# Patient Record
Sex: MALE | Race: WHITE | NOT HISPANIC OR LATINO | URBAN - METROPOLITAN AREA
[De-identification: names, ages, dates, MRNs, and addresses within clinical notes are randomized per-mention and may not be internally consistent; named-entity substitution may affect disease eponyms.]

---

## 2017-03-06 RX ORDER — MOXIFLOXACIN HYDROCHLORIDE TABLETS, 400 MG 400 MG/1
1 TABLET, FILM COATED ORAL
Qty: 0 | Refills: 0 | COMMUNITY
Start: 2017-03-06 | End: 2017-03-16

## 2017-03-14 ENCOUNTER — INPATIENT (INPATIENT)
Facility: HOSPITAL | Age: 67
LOS: 3 days | Discharge: HOME | DRG: 866 | End: 2017-03-18
Attending: INTERNAL MEDICINE | Admitting: INTERNAL MEDICINE
Payer: COMMERCIAL

## 2017-03-14 VITALS — SYSTOLIC BLOOD PRESSURE: 187 MMHG | DIASTOLIC BLOOD PRESSURE: 101 MMHG | RESPIRATION RATE: 22 BRPM

## 2017-03-14 DIAGNOSIS — I50.9 HEART FAILURE, UNSPECIFIED: ICD-10-CM

## 2017-03-14 LAB
ALBUMIN SERPL ELPH-MCNC: 4.7 G/DL — SIGNIFICANT CHANGE UP (ref 3.3–5)
ALP SERPL-CCNC: 121 U/L — HIGH (ref 40–120)
ALT FLD-CCNC: 23 U/L RC — SIGNIFICANT CHANGE UP (ref 10–45)
ANION GAP SERPL CALC-SCNC: 17 MMOL/L — SIGNIFICANT CHANGE UP (ref 5–17)
APTT BLD: 30 SEC — SIGNIFICANT CHANGE UP (ref 27.5–37.4)
AST SERPL-CCNC: 17 U/L — SIGNIFICANT CHANGE UP (ref 10–40)
BASE EXCESS BLDV CALC-SCNC: 2 MMOL/L — SIGNIFICANT CHANGE UP (ref -2–2)
BASOPHILS # BLD AUTO: 0 K/UL — SIGNIFICANT CHANGE UP (ref 0–0.2)
BASOPHILS NFR BLD AUTO: 0.1 % — SIGNIFICANT CHANGE UP (ref 0–2)
BILIRUB SERPL-MCNC: 0.3 MG/DL — SIGNIFICANT CHANGE UP (ref 0.2–1.2)
BUN SERPL-MCNC: 22 MG/DL — SIGNIFICANT CHANGE UP (ref 7–23)
CA-I SERPL-SCNC: 1.26 MMOL/L — SIGNIFICANT CHANGE UP (ref 1.12–1.3)
CALCIUM SERPL-MCNC: 10.7 MG/DL — HIGH (ref 8.4–10.5)
CHLORIDE BLDV-SCNC: 105 MMOL/L — SIGNIFICANT CHANGE UP (ref 96–108)
CHLORIDE SERPL-SCNC: 97 MMOL/L — SIGNIFICANT CHANGE UP (ref 96–108)
CK SERPL-CCNC: 86 U/L — SIGNIFICANT CHANGE UP (ref 30–200)
CO2 BLDV-SCNC: 28 MMOL/L — SIGNIFICANT CHANGE UP (ref 22–30)
CO2 SERPL-SCNC: 24 MMOL/L — SIGNIFICANT CHANGE UP (ref 22–31)
CREAT SERPL-MCNC: 1.19 MG/DL — SIGNIFICANT CHANGE UP (ref 0.5–1.3)
EOSINOPHIL # BLD AUTO: 0.2 K/UL — SIGNIFICANT CHANGE UP (ref 0–0.5)
EOSINOPHIL NFR BLD AUTO: 3.1 % — SIGNIFICANT CHANGE UP (ref 0–6)
GAS PNL BLDV: 134 MMOL/L — LOW (ref 136–145)
GAS PNL BLDV: SIGNIFICANT CHANGE UP
GAS PNL BLDV: SIGNIFICANT CHANGE UP
GLUCOSE BLDV-MCNC: 277 MG/DL — HIGH (ref 70–99)
GLUCOSE SERPL-MCNC: 277 MG/DL — HIGH (ref 70–99)
HCO3 BLDV-SCNC: 27 MMOL/L — SIGNIFICANT CHANGE UP (ref 21–29)
HCOV 229E RNA SPEC QL NAA+PROBE: DETECTED
HCT VFR BLD CALC: 45.7 % — SIGNIFICANT CHANGE UP (ref 39–50)
HCT VFR BLDA CALC: 49 % — SIGNIFICANT CHANGE UP (ref 39–50)
HGB BLD CALC-MCNC: 16.1 G/DL — SIGNIFICANT CHANGE UP (ref 13–17)
HGB BLD-MCNC: 16.1 G/DL — SIGNIFICANT CHANGE UP (ref 13–17)
INR BLD: 1.06 RATIO — SIGNIFICANT CHANGE UP (ref 0.88–1.16)
LACTATE BLDV-MCNC: 2.5 MMOL/L — HIGH (ref 0.7–2)
LYMPHOCYTES # BLD AUTO: 0.8 K/UL — LOW (ref 1–3.3)
LYMPHOCYTES # BLD AUTO: 12.5 % — LOW (ref 13–44)
MAGNESIUM SERPL-MCNC: 1.7 MG/DL — SIGNIFICANT CHANGE UP (ref 1.6–2.6)
MCHC RBC-ENTMCNC: 32.4 PG — SIGNIFICANT CHANGE UP (ref 27–34)
MCHC RBC-ENTMCNC: 35.2 GM/DL — SIGNIFICANT CHANGE UP (ref 32–36)
MCV RBC AUTO: 91.9 FL — SIGNIFICANT CHANGE UP (ref 80–100)
MONOCYTES # BLD AUTO: 0.6 K/UL — SIGNIFICANT CHANGE UP (ref 0–0.9)
MONOCYTES NFR BLD AUTO: 9.9 % — SIGNIFICANT CHANGE UP (ref 2–14)
NEUTROPHILS # BLD AUTO: 4.6 K/UL — SIGNIFICANT CHANGE UP (ref 1.8–7.4)
NEUTROPHILS NFR BLD AUTO: 74.4 % — SIGNIFICANT CHANGE UP (ref 43–77)
NT-PROBNP SERPL-SCNC: 102 PG/ML — SIGNIFICANT CHANGE UP (ref 0–300)
OTHER CELLS CSF MANUAL: 8 ML/DL — LOW (ref 18–22)
PCO2 BLDV: 45 MMHG — SIGNIFICANT CHANGE UP (ref 35–50)
PH BLDV: 7.4 — SIGNIFICANT CHANGE UP (ref 7.35–7.45)
PHOSPHATE SERPL-MCNC: 2.7 MG/DL — SIGNIFICANT CHANGE UP (ref 2.5–4.5)
PLATELET # BLD AUTO: 273 K/UL — SIGNIFICANT CHANGE UP (ref 150–400)
PO2 BLDV: 23 MMHG — LOW (ref 25–45)
POTASSIUM BLDV-SCNC: 4.3 MMOL/L — SIGNIFICANT CHANGE UP (ref 3.5–5)
POTASSIUM SERPL-MCNC: 4.6 MMOL/L — SIGNIFICANT CHANGE UP (ref 3.5–5.3)
POTASSIUM SERPL-SCNC: 4.6 MMOL/L — SIGNIFICANT CHANGE UP (ref 3.5–5.3)
PROT SERPL-MCNC: 8.4 G/DL — HIGH (ref 6–8.3)
PROTHROM AB SERPL-ACNC: 11.6 SEC — SIGNIFICANT CHANGE UP (ref 10–13.1)
RAPID RVP RESULT: DETECTED
RBC # BLD: 4.98 M/UL — SIGNIFICANT CHANGE UP (ref 4.2–5.8)
RBC # FLD: 11.7 % — SIGNIFICANT CHANGE UP (ref 10.3–14.5)
RSV RNA SPEC QL NAA+PROBE: DETECTED
SAO2 % BLDV: 36 % — LOW (ref 67–88)
SODIUM SERPL-SCNC: 138 MMOL/L — SIGNIFICANT CHANGE UP (ref 135–145)
TROPONIN T SERPL-MCNC: <0.01 NG/ML — SIGNIFICANT CHANGE UP (ref 0–0.06)
WBC # BLD: 6.2 K/UL — SIGNIFICANT CHANGE UP (ref 3.8–10.5)
WBC # FLD AUTO: 6.2 K/UL — SIGNIFICANT CHANGE UP (ref 3.8–10.5)

## 2017-03-14 PROCEDURE — 93010 ELECTROCARDIOGRAM REPORT: CPT

## 2017-03-14 PROCEDURE — 99291 CRITICAL CARE FIRST HOUR: CPT | Mod: 25

## 2017-03-14 PROCEDURE — 71010: CPT | Mod: 26

## 2017-03-14 PROCEDURE — 99223 1ST HOSP IP/OBS HIGH 75: CPT

## 2017-03-14 RX ORDER — LABETALOL HCL 100 MG
10 TABLET ORAL ONCE
Qty: 0 | Refills: 0 | Status: COMPLETED | OUTPATIENT
Start: 2017-03-14 | End: 2017-03-14

## 2017-03-14 RX ORDER — IPRATROPIUM/ALBUTEROL SULFATE 18-103MCG
3 AEROSOL WITH ADAPTER (GRAM) INHALATION EVERY 6 HOURS
Qty: 0 | Refills: 0 | Status: DISCONTINUED | OUTPATIENT
Start: 2017-03-14 | End: 2017-03-15

## 2017-03-14 RX ORDER — ASPIRIN/CALCIUM CARB/MAGNESIUM 324 MG
81 TABLET ORAL ONCE
Qty: 0 | Refills: 0 | Status: DISCONTINUED | OUTPATIENT
Start: 2017-03-14 | End: 2017-03-14

## 2017-03-14 RX ORDER — ACETAMINOPHEN 500 MG
650 TABLET ORAL EVERY 6 HOURS
Qty: 0 | Refills: 0 | Status: DISCONTINUED | OUTPATIENT
Start: 2017-03-14 | End: 2017-03-18

## 2017-03-14 RX ORDER — ASPIRIN/CALCIUM CARB/MAGNESIUM 324 MG
324 TABLET ORAL ONCE
Qty: 0 | Refills: 0 | Status: COMPLETED | OUTPATIENT
Start: 2017-03-14 | End: 2017-03-14

## 2017-03-14 RX ORDER — FUROSEMIDE 40 MG
40 TABLET ORAL ONCE
Qty: 0 | Refills: 0 | Status: COMPLETED | OUTPATIENT
Start: 2017-03-14 | End: 2017-03-14

## 2017-03-14 RX ADMIN — Medication 243 MILLIGRAM(S): at 22:45

## 2017-03-14 RX ADMIN — Medication 10 MILLIGRAM(S): at 21:47

## 2017-03-14 RX ADMIN — Medication 40 MILLIGRAM(S): at 22:44

## 2017-03-14 NOTE — H&P ADULT. - NEUROLOGICAL DETAILS
normal strength/responds to verbal commands/responds to pain/sensation intact/cranial nerves intact/alert and oriented x 3

## 2017-03-14 NOTE — ED PROVIDER NOTE - CRITICAL CARE PROVIDED
additional history taking/interpretation of diagnostic studies/consult w/ pt's family directly relating to pts condition/documentation/direct patient care (not related to procedure)

## 2017-03-14 NOTE — H&P ADULT. - ATTENDING COMMENTS
Dr. Cuba Wayne accepted patient's case from the ED and requested hospitalist team to complete admission. Patient previously unknown to me and I was assigned to case. Dr. Wayne/[facility] team to continue care. Dr. Cuba Wayne accepted patient's case from the ED and requested hospitalist team to complete admission. Patient previously unknown to me and I was assigned to case. Dr. Wayne/[facility] team to continue care. Sign out provided to NP.

## 2017-03-14 NOTE — ED ADULT NURSE NOTE - CHPI ED SYMPTOMS NEG
no cough/no dizziness/no fever/no shortness of breath/no diaphoresis/no chills/no syncope/no nausea/no vomiting/no back pain

## 2017-03-14 NOTE — H&P ADULT. - PROBLEM SELECTOR PLAN 4
Patient with reported SBP in 200s yet documented SBP 180s in ED.  BP improved in setting after labetalol and lasix  Continue home regimen

## 2017-03-14 NOTE — ED PROVIDER NOTE - PROGRESS NOTE DETAILS
Dr. Stoddard Note: pt much improved after bipap, BP reduction, and lasix.  Will avoid further beta-blocker use given concern for acute chf, use nitrates and lasix and bipap as needed, stable for admission.  Checked rectal temp, normal, and given +JVD, +cardiac wheeze and rales and very BP with prior h/o CHF, suspect pt with acute pulmonary edema and CHF rather than pneumonia.  Stable for admission, updated pt and family.  Pt wanted to leave, but after updating on all of this, pt agreeable to stay.

## 2017-03-14 NOTE — ED PROVIDER NOTE - CARE PLAN
Principal Discharge DX:	CHF exacerbation  Secondary Diagnosis:	Dyspnea on exertion  Secondary Diagnosis:	Respiratory difficulty

## 2017-03-14 NOTE — H&P ADULT. - ASSESSMENT
65 yo man with PMH of Sarcoidosis, Asthma, DM Type 2, Ventricular Dysfunction ?CHF with AICD placed 4 years ago (last reported EF 45% Sept 2016), HTN, BPH, presents from home in setting of persistent productive cough and shortness of breath on exertion concerning for pulm edema in setting of RSV and coronavirus infection. 67 yo man with PMH of Sarcoidosis, Asthma, DM Type 2, Ventricular Dysfunction ?CHF with AICD placed 4 years ago (last reported EF 45% Sept 2016), HTN, BPH, presents from home in setting of persistent productive cough and shortness of breath on exertion concerning for pulmonary edema in setting of RSV and coronavirus infection.

## 2017-03-14 NOTE — ED PROVIDER NOTE - PHYSICAL EXAMINATION
Physical Exam: elderly M in acute respiratory distress, AAOx3, NCAT, MMM, Trachea midline, PERRLA, bilateral ronchi and expiratory wheeze, no rales, tachycardia and regular rhythm, abdomen is soft and NTND, No LE edema, No deformity of extremities, No rashes, CN grossly intact, No focal motor or sensory deficits. ~ Bill Reveles MD

## 2017-03-14 NOTE — H&P ADULT. - PROBLEM SELECTOR PLAN 5
Per patient he has not been formally diagnosed with CHF however last reported EF per patient at 45%  Check TTE  Monitor I/O  Daily weights  Continue regimen of Entresto and Spironolactone   Primary day team to dose Lasix daily prn pending patient's fluid status during day

## 2017-03-14 NOTE — ED PROVIDER NOTE - MEDICAL DECISION MAKING DETAILS
dyspnea on exertion, acute resp distress, cough, concerning for CHF exacerbation vs PNA, COPD, exam shows cardiac wheeze and ronchi, CXR w/ L>R cephalization of vessels, initially hypertensive to 180-200 systolic. will give lasix, bipap and admit to tele

## 2017-03-14 NOTE — H&P ADULT. - HISTORY OF PRESENT ILLNESS
Hannah, 67 yo man with PMH of Sarcoidosis, Asthma, DM Type 2, Ventricular Dysfunction ?CHF with AICD placed 4 years ago (last reported EF 45% Sept 2016), HTN, BPH, presents from home in setting of persistent productive cough and shortness of breath on exertion in the past few days acutely worsening today. In the past few days the cough is yellow-green mucus, nasal discharge, and head congestion. Intermittent nausea in setting intractable cough at times yet no emesis. Denies associated CP, palpitations, diaphoresis.  Patient states that he has been experiencing a productive cough in the past cough that initially began 1 month ago where he saw his PMD: obtained CXR with revealed PNA and was prescribed a Z-pack. Patient felt improvement. Thereafter, patient went to [place] returned 2 weeks ago where coughing returned and saw PMD: at that time he was prescribed Moxifloxacin (took 8 days of  course), Medrol Dose pack, Proair. Patient did not feel improvement of symptoms and came to the ED. Denies fevers, chills, sweats. Denies SOB at rest. Denies orthopnea, LE edema.     Patient states that an cardiac ischemic work up in the past prior to AICD placement and denies placement of stents. Hannah, 67 yo man with PMH of Sarcoidosis, Asthma, DM Type 2, Ventricular Dysfunction ?CHF with AICD placed 4 years ago (last reported EF 45% Sept 2016), HTN, BPH, presents from home in setting of persistent productive cough and shortness of breath on exertion in the past few days acutely worsening today. In the past few days the cough is yellow-green mucus, nasal discharge, and head congestion. Intermittent nausea in setting intractable cough at times yet no emesis. Denies associated CP, palpitations, diaphoresis.  Patient states that he has been experiencing a productive cough in the past cough that initially began 1 month ago where he saw his PMD: obtained CXR with revealed PNA and was prescribed a Z-pack. Patient felt improvement. Thereafter, patient went to [place] returned 2 weeks ago where coughing returned and saw PMD: at that time he was prescribed Moxifloxacin (took 8 days of  course), Medrol Dose pack, Proair. Patient did not feel improvement of symptoms and came to the ED. Denies fevers, chills, sweats. Denies SOB at rest. Denies orthopnea, LE edema.     Patient states that an cardiac ischemic work up in the past prior to AICD placement and denies placement of stents.  Of note, with regards to ventricular dysfunction, patient states he was found to have scarring of the ventricular and it did not appear ischemic in nature, unable to obtain biopsy in past.

## 2017-03-14 NOTE — H&P ADULT. - PROBLEM SELECTOR PLAN 6
Elevated serum glucose 277  Hold Glyburide/Metformin  Check HgbA1c. Monitor FS  Corrective SSI  Half Lantus dose (14U) qHS in setting of NPO status while on BiPAP  Primary day team to readjust insulin regimen on 3/15 day pending FS and diet status.

## 2017-03-14 NOTE — ED PROVIDER NOTE - NS ED ROS FT
No fever, no chills, no change in vision, no change in hearing, no chest pain, + shortness of breath, no abdominal pain, no vomiting, no dysuria, no muscle pain, no rashes, no loss of consciousness. ~ Bill Reveles MD

## 2017-03-14 NOTE — ED PROVIDER NOTE - ATTENDING CONTRIBUTION TO CARE
I, Dr. Ivan Stoddard, interviewed the patient and performed a physical exam and spoke to the resident about the plan of care for this patient.  Pt with acute worsening of dyspnea today with cough, no fever.  +JVD about 4cm, cardiac wheezing and rales, BP high, no peripheral edema, tachypneic with increased work of breathing.

## 2017-03-14 NOTE — ED PROVIDER NOTE - OBJECTIVE STATEMENT
SOB and coughing for 10 days, worsening acutely today. Dyspnea on exertion, dec activity tolerance.   Productive with white sputum, no hemoptysis. Associated w/ nausea. No CP, vomiting, headache.  1 month ago took Z=anu for cough. Then placed on moxifloxacin for cough, also given steroids and inhalers.  Hx of AICD, DM (no HTN), "ventricular dysfunction"    PMD: [facility] (previously Jose David Barry)  Cardio: [facility]

## 2017-03-14 NOTE — ED ADULT NURSE NOTE - OBJECTIVE STATEMENT
Pt 67 yo male diff breathing x 2 days with pmh HTN, ICD for cardiomyopathy, patient appears slightly tachypneic, o2 sat 95 RA. Denies chest pain, dizziness, LOC, Weakness. SOB gets worst with activity and improves with rest.  Rhonchi noted bilateral lungs. Pt not on blood thinners. No fever or chills at this time. Had upper respiratory infection about a week ago, and was placed on zpack by PCP. Patient placed on Bipap machine, EKG completed. Labs drawn and sent. Maintained on cardiac monitor.

## 2017-03-14 NOTE — ED ADULT NURSE REASSESSMENT NOTE - NS ED NURSE REASSESS COMMENT FT1
Patient refusing to take Lasix at this time " unless its absolutely necessary" pt states he already suffers from urinary frequency. Awaiting results from xray, and labs. Updated on plan of care. MD Reveles made aware of pt concern. Awaiting further instruction. Safety and supportive care provided.

## 2017-03-14 NOTE — H&P ADULT. - RESPIRATORY COMMENTS
on BiPAP and states he feels improved with BiPAP on BiPAP and states he feels improved with BiPAP. speaking in full sentense

## 2017-03-14 NOTE — H&P ADULT. - FAMILY HISTORY
Father  Still living? Unknown  Family history of acute congestive heart failure, Age at diagnosis: Age Unknown  Family history of chronic renal failure syndrome, Age at diagnosis: Age Unknown     Mother  Still living? Unknown  Family history of heart disease, Age at diagnosis: Age Unknown

## 2017-03-14 NOTE — H&P ADULT. - PMH
Asthma    BPH (benign prostatic hyperplasia)    Diabetes mellitus type 2 in nonobese    Sarcoidosis    Ventricular dysfunction of heart

## 2017-03-14 NOTE — H&P ADULT. - PROBLEM SELECTOR PLAN 1
Multifactorial in setting of accelerated HTN likely causing pulmonary edema and viral infection of both RSV and Coronavirus.   Patient given Labetalol 10 mg IV x1 and Lasix 40 mg IV x1 and placed on BiPAP by ED team: Patient endorses improvement of symptoms  May have component of asthma exacerbation in setting of viral infection  -Initiates duoneb  -Continue BiPAP and consider wean overnight.  -Monitor I/O  -Daily weights  -Monitor tele  -Trend CE  -Check TTE  -BP control with home regimen   -Check CT Chest in setting of possible persistent PNA in setting of chronicity of symptoms and underlying Sarcoid.

## 2017-03-15 DIAGNOSIS — Z29.9 ENCOUNTER FOR PROPHYLACTIC MEASURES, UNSPECIFIED: ICD-10-CM

## 2017-03-15 DIAGNOSIS — B97.4 RESPIRATORY SYNCYTIAL VIRUS AS THE CAUSE OF DISEASES CLASSIFIED ELSEWHERE: ICD-10-CM

## 2017-03-15 DIAGNOSIS — I51.9 HEART DISEASE, UNSPECIFIED: ICD-10-CM

## 2017-03-15 DIAGNOSIS — E11.9 TYPE 2 DIABETES MELLITUS WITHOUT COMPLICATIONS: ICD-10-CM

## 2017-03-15 DIAGNOSIS — I10 ESSENTIAL (PRIMARY) HYPERTENSION: ICD-10-CM

## 2017-03-15 DIAGNOSIS — B34.2 CORONAVIRUS INFECTION, UNSPECIFIED: ICD-10-CM

## 2017-03-15 DIAGNOSIS — R06.09 OTHER FORMS OF DYSPNEA: ICD-10-CM

## 2017-03-15 DIAGNOSIS — Z95.810 PRESENCE OF AUTOMATIC (IMPLANTABLE) CARDIAC DEFIBRILLATOR: Chronic | ICD-10-CM

## 2017-03-15 LAB
ANION GAP SERPL CALC-SCNC: 17 MMOL/L — SIGNIFICANT CHANGE UP (ref 5–17)
BASOPHILS # BLD AUTO: 0.02 K/UL — SIGNIFICANT CHANGE UP (ref 0–0.2)
BASOPHILS NFR BLD AUTO: 0.3 % — SIGNIFICANT CHANGE UP (ref 0–2)
BUN SERPL-MCNC: 25 MG/DL — HIGH (ref 7–23)
CALCIUM SERPL-MCNC: 9.8 MG/DL — SIGNIFICANT CHANGE UP (ref 8.4–10.5)
CHLORIDE SERPL-SCNC: 99 MMOL/L — SIGNIFICANT CHANGE UP (ref 96–108)
CK MB CFR SERPL CALC: 2.2 NG/ML — SIGNIFICANT CHANGE UP (ref 0–6.7)
CK SERPL-CCNC: 56 U/L — SIGNIFICANT CHANGE UP (ref 30–200)
CO2 SERPL-SCNC: 22 MMOL/L — SIGNIFICANT CHANGE UP (ref 22–31)
CREAT SERPL-MCNC: 1.12 MG/DL — SIGNIFICANT CHANGE UP (ref 0.5–1.3)
EOSINOPHIL # BLD AUTO: 0.11 K/UL — SIGNIFICANT CHANGE UP (ref 0–0.5)
EOSINOPHIL NFR BLD AUTO: 1.8 % — SIGNIFICANT CHANGE UP (ref 0–6)
GAS PNL BLDA: SIGNIFICANT CHANGE UP
GLUCOSE SERPL-MCNC: 167 MG/DL — HIGH (ref 70–99)
HBA1C BLD-MCNC: 7 % — HIGH (ref 4–5.6)
HCT VFR BLD CALC: 41.8 % — SIGNIFICANT CHANGE UP (ref 39–50)
HGB BLD-MCNC: 14.4 G/DL — SIGNIFICANT CHANGE UP (ref 13–17)
IMM GRANULOCYTES NFR BLD AUTO: 0.3 % — SIGNIFICANT CHANGE UP (ref 0–1.5)
LYMPHOCYTES # BLD AUTO: 0.62 K/UL — LOW (ref 1–3.3)
LYMPHOCYTES # BLD AUTO: 10.4 % — LOW (ref 13–44)
MAGNESIUM SERPL-MCNC: 1.6 MG/DL — SIGNIFICANT CHANGE UP (ref 1.6–2.6)
MCHC RBC-ENTMCNC: 31.3 PG — SIGNIFICANT CHANGE UP (ref 27–34)
MCHC RBC-ENTMCNC: 34.4 GM/DL — SIGNIFICANT CHANGE UP (ref 32–36)
MCV RBC AUTO: 90.9 FL — SIGNIFICANT CHANGE UP (ref 80–100)
MONOCYTES # BLD AUTO: 0.86 K/UL — SIGNIFICANT CHANGE UP (ref 0–0.9)
MONOCYTES NFR BLD AUTO: 14.5 % — HIGH (ref 2–14)
NEUTROPHILS # BLD AUTO: 4.32 K/UL — SIGNIFICANT CHANGE UP (ref 1.8–7.4)
NEUTROPHILS NFR BLD AUTO: 72.7 % — SIGNIFICANT CHANGE UP (ref 43–77)
PHOSPHATE SERPL-MCNC: 3.9 MG/DL — SIGNIFICANT CHANGE UP (ref 2.5–4.5)
PLATELET # BLD AUTO: 242 K/UL — SIGNIFICANT CHANGE UP (ref 150–400)
POTASSIUM SERPL-MCNC: 4.5 MMOL/L — SIGNIFICANT CHANGE UP (ref 3.5–5.3)
POTASSIUM SERPL-SCNC: 4.5 MMOL/L — SIGNIFICANT CHANGE UP (ref 3.5–5.3)
RBC # BLD: 4.6 M/UL — SIGNIFICANT CHANGE UP (ref 4.2–5.8)
RBC # FLD: 12.8 % — SIGNIFICANT CHANGE UP (ref 10.3–14.5)
SODIUM SERPL-SCNC: 138 MMOL/L — SIGNIFICANT CHANGE UP (ref 135–145)
TROPONIN T SERPL-MCNC: <0.01 NG/ML — SIGNIFICANT CHANGE UP (ref 0–0.06)
WBC # BLD: 5.95 K/UL — SIGNIFICANT CHANGE UP (ref 3.8–10.5)
WBC # FLD AUTO: 5.95 K/UL — SIGNIFICANT CHANGE UP (ref 3.8–10.5)

## 2017-03-15 PROCEDURE — 93306 TTE W/DOPPLER COMPLETE: CPT | Mod: 26

## 2017-03-15 PROCEDURE — 71250 CT THORAX DX C-: CPT | Mod: 26

## 2017-03-15 RX ORDER — INSULIN LISPRO 100/ML
VIAL (ML) SUBCUTANEOUS AT BEDTIME
Qty: 0 | Refills: 0 | Status: DISCONTINUED | OUTPATIENT
Start: 2017-03-15 | End: 2017-03-18

## 2017-03-15 RX ORDER — DEXTROSE 50 % IN WATER 50 %
25 SYRINGE (ML) INTRAVENOUS ONCE
Qty: 0 | Refills: 0 | Status: DISCONTINUED | OUTPATIENT
Start: 2017-03-15 | End: 2017-03-18

## 2017-03-15 RX ORDER — SODIUM CHLORIDE 9 MG/ML
1000 INJECTION, SOLUTION INTRAVENOUS
Qty: 0 | Refills: 0 | Status: DISCONTINUED | OUTPATIENT
Start: 2017-03-15 | End: 2017-03-18

## 2017-03-15 RX ORDER — DEXTROSE 50 % IN WATER 50 %
1 SYRINGE (ML) INTRAVENOUS ONCE
Qty: 0 | Refills: 0 | Status: DISCONTINUED | OUTPATIENT
Start: 2017-03-15 | End: 2017-03-18

## 2017-03-15 RX ORDER — SACUBITRIL AND VALSARTAN 24; 26 MG/1; MG/1
1 TABLET, FILM COATED ORAL
Qty: 0 | Refills: 0 | Status: DISCONTINUED | OUTPATIENT
Start: 2017-03-15 | End: 2017-03-15

## 2017-03-15 RX ORDER — SACUBITRIL AND VALSARTAN 24; 26 MG/1; MG/1
1 TABLET, FILM COATED ORAL
Qty: 0 | Refills: 0 | Status: DISCONTINUED | OUTPATIENT
Start: 2017-03-15 | End: 2017-03-18

## 2017-03-15 RX ORDER — INSULIN LISPRO 100/ML
VIAL (ML) SUBCUTANEOUS EVERY 6 HOURS
Qty: 0 | Refills: 0 | Status: DISCONTINUED | OUTPATIENT
Start: 2017-03-15 | End: 2017-03-16

## 2017-03-15 RX ORDER — IPRATROPIUM/ALBUTEROL SULFATE 18-103MCG
3 AEROSOL WITH ADAPTER (GRAM) INHALATION EVERY 6 HOURS
Qty: 0 | Refills: 0 | Status: DISCONTINUED | OUTPATIENT
Start: 2017-03-15 | End: 2017-03-15

## 2017-03-15 RX ORDER — HEPARIN SODIUM 5000 [USP'U]/ML
5000 INJECTION INTRAVENOUS; SUBCUTANEOUS EVERY 8 HOURS
Qty: 0 | Refills: 0 | Status: DISCONTINUED | OUTPATIENT
Start: 2017-03-15 | End: 2017-03-18

## 2017-03-15 RX ORDER — GLUCAGON INJECTION, SOLUTION 0.5 MG/.1ML
1 INJECTION, SOLUTION SUBCUTANEOUS ONCE
Qty: 0 | Refills: 0 | Status: DISCONTINUED | OUTPATIENT
Start: 2017-03-15 | End: 2017-03-18

## 2017-03-15 RX ORDER — SPIRONOLACTONE 25 MG/1
25 TABLET, FILM COATED ORAL DAILY
Qty: 0 | Refills: 0 | Status: DISCONTINUED | OUTPATIENT
Start: 2017-03-15 | End: 2017-03-18

## 2017-03-15 RX ORDER — IPRATROPIUM/ALBUTEROL SULFATE 18-103MCG
3 AEROSOL WITH ADAPTER (GRAM) INHALATION
Qty: 0 | Refills: 0 | Status: DISCONTINUED | OUTPATIENT
Start: 2017-03-15 | End: 2017-03-18

## 2017-03-15 RX ORDER — TAMSULOSIN HYDROCHLORIDE 0.4 MG/1
0.4 CAPSULE ORAL AT BEDTIME
Qty: 0 | Refills: 0 | Status: DISCONTINUED | OUTPATIENT
Start: 2017-03-15 | End: 2017-03-18

## 2017-03-15 RX ORDER — SACUBITRIL AND VALSARTAN 24; 26 MG/1; MG/1
1 TABLET, FILM COATED ORAL
Qty: 0 | Refills: 0 | COMMUNITY

## 2017-03-15 RX ORDER — METOPROLOL TARTRATE 50 MG
50 TABLET ORAL DAILY
Qty: 0 | Refills: 0 | Status: DISCONTINUED | OUTPATIENT
Start: 2017-03-15 | End: 2017-03-18

## 2017-03-15 RX ORDER — INSULIN GLARGINE 100 [IU]/ML
14 INJECTION, SOLUTION SUBCUTANEOUS AT BEDTIME
Qty: 0 | Refills: 0 | Status: DISCONTINUED | OUTPATIENT
Start: 2017-03-15 | End: 2017-03-18

## 2017-03-15 RX ORDER — DEXTROSE 50 % IN WATER 50 %
12.5 SYRINGE (ML) INTRAVENOUS ONCE
Qty: 0 | Refills: 0 | Status: DISCONTINUED | OUTPATIENT
Start: 2017-03-15 | End: 2017-03-18

## 2017-03-15 RX ORDER — SPIRONOLACTONE 25 MG/1
0 TABLET, FILM COATED ORAL
Qty: 0 | Refills: 0 | COMMUNITY

## 2017-03-15 RX ORDER — INSULIN DETEMIR 100/ML (3)
0 INSULIN PEN (ML) SUBCUTANEOUS
Qty: 0 | Refills: 0 | COMMUNITY

## 2017-03-15 RX ORDER — MOXIFLOXACIN HYDROCHLORIDE TABLETS, 400 MG 400 MG/1
0 TABLET, FILM COATED ORAL
Qty: 0 | Refills: 0 | COMMUNITY

## 2017-03-15 RX ORDER — BUDESONIDE, MICRONIZED 100 %
0.5 POWDER (GRAM) MISCELLANEOUS EVERY 12 HOURS
Qty: 0 | Refills: 0 | Status: DISCONTINUED | OUTPATIENT
Start: 2017-03-15 | End: 2017-03-18

## 2017-03-15 RX ORDER — BUDESONIDE AND FORMOTEROL FUMARATE DIHYDRATE 160; 4.5 UG/1; UG/1
2 AEROSOL RESPIRATORY (INHALATION)
Qty: 0 | Refills: 0 | COMMUNITY

## 2017-03-15 RX ADMIN — Medication 3: at 21:51

## 2017-03-15 RX ADMIN — Medication 200 MILLIGRAM(S): at 12:00

## 2017-03-15 RX ADMIN — Medication 3 MILLILITER(S): at 05:17

## 2017-03-15 RX ADMIN — Medication 200 MILLIGRAM(S): at 21:20

## 2017-03-15 RX ADMIN — Medication 2: at 19:01

## 2017-03-15 RX ADMIN — SPIRONOLACTONE 25 MILLIGRAM(S): 25 TABLET, FILM COATED ORAL at 18:56

## 2017-03-15 RX ADMIN — Medication 3 MILLILITER(S): at 12:25

## 2017-03-15 RX ADMIN — Medication 40 MILLIGRAM(S): at 14:11

## 2017-03-15 RX ADMIN — Medication 3 MILLILITER(S): at 18:55

## 2017-03-15 RX ADMIN — Medication 0.5 MILLIGRAM(S): at 21:19

## 2017-03-15 RX ADMIN — Medication 3 MILLILITER(S): at 21:19

## 2017-03-15 RX ADMIN — Medication 50 MILLIGRAM(S): at 18:57

## 2017-03-15 RX ADMIN — Medication 3 MILLILITER(S): at 00:23

## 2017-03-15 RX ADMIN — TAMSULOSIN HYDROCHLORIDE 0.4 MILLIGRAM(S): 0.4 CAPSULE ORAL at 21:20

## 2017-03-15 RX ADMIN — INSULIN GLARGINE 14 UNIT(S): 100 INJECTION, SOLUTION SUBCUTANEOUS at 22:24

## 2017-03-15 RX ADMIN — Medication 40 MILLIGRAM(S): at 18:56

## 2017-03-15 RX ADMIN — Medication 3: at 13:49

## 2017-03-15 NOTE — ED ADULT NURSE REASSESSMENT NOTE - NS ED NURSE REASSESS COMMENT FT1
Patient alert and oriented x 3. Updated on plan of care. Patient awaiting isolation room (admission bed). Report given to holding. Respiratory paged for transfer to holding room. Maintained on cardiac monitor, and Bipap machine.

## 2017-03-16 LAB
ANION GAP SERPL CALC-SCNC: 18 MMOL/L — HIGH (ref 5–17)
BUN SERPL-MCNC: 31 MG/DL — HIGH (ref 7–23)
CALCIUM SERPL-MCNC: 9.6 MG/DL — SIGNIFICANT CHANGE UP (ref 8.4–10.5)
CHLORIDE SERPL-SCNC: 97 MMOL/L — SIGNIFICANT CHANGE UP (ref 96–108)
CO2 SERPL-SCNC: 21 MMOL/L — LOW (ref 22–31)
CREAT SERPL-MCNC: 1.21 MG/DL — SIGNIFICANT CHANGE UP (ref 0.5–1.3)
GLUCOSE SERPL-MCNC: 305 MG/DL — HIGH (ref 70–99)
HCT VFR BLD CALC: 39.5 % — SIGNIFICANT CHANGE UP (ref 39–50)
HGB BLD-MCNC: 13.6 G/DL — SIGNIFICANT CHANGE UP (ref 13–17)
MCHC RBC-ENTMCNC: 31.1 PG — SIGNIFICANT CHANGE UP (ref 27–34)
MCHC RBC-ENTMCNC: 34.4 GM/DL — SIGNIFICANT CHANGE UP (ref 32–36)
MCV RBC AUTO: 90.4 FL — SIGNIFICANT CHANGE UP (ref 80–100)
PLATELET # BLD AUTO: 283 K/UL — SIGNIFICANT CHANGE UP (ref 150–400)
POTASSIUM SERPL-MCNC: 5.1 MMOL/L — SIGNIFICANT CHANGE UP (ref 3.5–5.3)
POTASSIUM SERPL-SCNC: 5.1 MMOL/L — SIGNIFICANT CHANGE UP (ref 3.5–5.3)
RBC # BLD: 4.37 M/UL — SIGNIFICANT CHANGE UP (ref 4.2–5.8)
RBC # FLD: 12.6 % — SIGNIFICANT CHANGE UP (ref 10.3–14.5)
SODIUM SERPL-SCNC: 136 MMOL/L — SIGNIFICANT CHANGE UP (ref 135–145)
WBC # BLD: 9.14 K/UL — SIGNIFICANT CHANGE UP (ref 3.8–10.5)
WBC # FLD AUTO: 9.14 K/UL — SIGNIFICANT CHANGE UP (ref 3.8–10.5)

## 2017-03-16 RX ORDER — INSULIN LISPRO 100/ML
12 VIAL (ML) SUBCUTANEOUS ONCE
Qty: 0 | Refills: 0 | Status: COMPLETED | OUTPATIENT
Start: 2017-03-16 | End: 2017-03-16

## 2017-03-16 RX ORDER — BENZOCAINE AND MENTHOL 5; 1 G/100ML; G/100ML
1 LIQUID ORAL ONCE
Qty: 0 | Refills: 0 | Status: COMPLETED | OUTPATIENT
Start: 2017-03-16 | End: 2017-03-16

## 2017-03-16 RX ORDER — INSULIN LISPRO 100/ML
VIAL (ML) SUBCUTANEOUS EVERY 6 HOURS
Qty: 0 | Refills: 0 | Status: DISCONTINUED | OUTPATIENT
Start: 2017-03-16 | End: 2017-03-18

## 2017-03-16 RX ADMIN — Medication 20 MILLIGRAM(S): at 17:55

## 2017-03-16 RX ADMIN — Medication 50 MILLIGRAM(S): at 05:41

## 2017-03-16 RX ADMIN — Medication 6: at 12:05

## 2017-03-16 RX ADMIN — Medication 3 MILLILITER(S): at 05:42

## 2017-03-16 RX ADMIN — Medication 3: at 22:36

## 2017-03-16 RX ADMIN — Medication 4: at 09:43

## 2017-03-16 RX ADMIN — Medication 200 MILLIGRAM(S): at 15:00

## 2017-03-16 RX ADMIN — Medication 40 MILLIGRAM(S): at 05:41

## 2017-03-16 RX ADMIN — Medication 10: at 17:56

## 2017-03-16 RX ADMIN — Medication 40 MILLIGRAM(S): at 00:13

## 2017-03-16 RX ADMIN — Medication 3 MILLILITER(S): at 21:04

## 2017-03-16 RX ADMIN — Medication 4: at 00:17

## 2017-03-16 RX ADMIN — Medication 12 UNIT(S): at 12:47

## 2017-03-16 RX ADMIN — Medication 3 MILLILITER(S): at 09:44

## 2017-03-16 RX ADMIN — Medication 0.5 MILLIGRAM(S): at 05:43

## 2017-03-16 RX ADMIN — Medication 3 MILLILITER(S): at 17:54

## 2017-03-16 RX ADMIN — Medication 200 MILLIGRAM(S): at 21:03

## 2017-03-16 RX ADMIN — SACUBITRIL AND VALSARTAN 1 TABLET(S): 24; 26 TABLET, FILM COATED ORAL at 17:56

## 2017-03-16 RX ADMIN — SACUBITRIL AND VALSARTAN 1 TABLET(S): 24; 26 TABLET, FILM COATED ORAL at 05:44

## 2017-03-16 RX ADMIN — Medication 0.5 MILLIGRAM(S): at 17:54

## 2017-03-16 RX ADMIN — BENZOCAINE AND MENTHOL 1 LOZENGE: 5; 1 LIQUID ORAL at 20:46

## 2017-03-16 RX ADMIN — SPIRONOLACTONE 25 MILLIGRAM(S): 25 TABLET, FILM COATED ORAL at 05:41

## 2017-03-16 RX ADMIN — Medication 20 MILLIGRAM(S): at 23:55

## 2017-03-16 RX ADMIN — Medication 200 MILLIGRAM(S): at 05:41

## 2017-03-16 RX ADMIN — Medication 3 MILLILITER(S): at 15:03

## 2017-03-16 RX ADMIN — INSULIN GLARGINE 14 UNIT(S): 100 INJECTION, SOLUTION SUBCUTANEOUS at 22:37

## 2017-03-16 RX ADMIN — TAMSULOSIN HYDROCHLORIDE 0.4 MILLIGRAM(S): 0.4 CAPSULE ORAL at 21:03

## 2017-03-16 NOTE — DIETITIAN INITIAL EVALUATION ADULT. - NS AS NUTRI INTERV ED CONTENT
Educated pt on consistent carbohydrate diet, portion sizing including benefit of mixed meals, "my plate" model, label reading and strategies to promote improved glucose control. Recommended pt to increase consumption of whole grains, consume protein and fiber with CHO, avoid concentrated sweets. Discussed healthier snack and meal ideas, carbohydrate counting methods, and reviewed sample meal. Discussed heart healthy diet; identified foods high in sodium and fats, limit sodium intake, increased consumption of lean meats, fruit and vegetables, healthy fats and oils, cooking tips, daily wt checks and wt gain parameters and healthy snacking options.

## 2017-03-16 NOTE — DIETITIAN INITIAL EVALUATION ADULT. - ORAL INTAKE PTA
Usual breakfast- fruits, english muffin with sugar free jelly, coffee; Lunch and dinner - wife prepares meals using fresh ingredients such as whole grain/multigrain rice/pasta/bread, vegetables, lean ,meats/fish. Pt reports snacking on fruits and only drinks water and diet soda./good

## 2017-03-16 NOTE — DIETITIAN INITIAL EVALUATION ADULT. - ADHERENCE
good/Pt avoids concentrated sweets, does not eat out or consume processed foods, avoids using salt and consumes moderate amounts of whole grain carbohydrates

## 2017-03-16 NOTE — DIETITIAN INITIAL EVALUATION ADULT. - ENERGY NEEDS
Height: 74 inches, Weight:182 pounds  BMI: 24 kg/m2 IBW: 190 pounds (+/-10%), %IBW:95%  Pertinent Info: Per chart, 67 y/o male with PMH of CHF with AICD, T2DM, asthma, sarcoidosis admitted with SOB s/p BiPAP. No edema, no pressure ulcers noted at this time.

## 2017-03-16 NOTE — DIETITIAN INITIAL EVALUATION ADULT. - OTHER INFO
Nutrition consult received for HgbA1c >7%. Pt reports good appetite and po intakes, noted 100% po intake at visit and flowsheet. No GI distress, +BM today. Pt denies chewing/swallowing difficulties. NKFA - but states he cannot tolerate yogurt or cheese, yet able to drink small amounts of milk. PTA takes omega 3, Co Q 10, and vitamin D3. Pt with T2DM, on Metformin and lantus at home, reports checking fingersticks 1-2 x day with usual blood glucose range in low 100's. Pt with CHF, not on any diuretics at home, denies checking daily wts. Pt states he has been trying to follow healthy diet and knows the importance of diet adherence as his father also had CHF and diabetes. Pt and wife receptive to diet education provided.

## 2017-03-16 NOTE — DIETITIAN INITIAL EVALUATION ADULT. - NS AS NUTRI INTERV MEALS SNACK
Reviewed alternate menu options and menu ordering procedure. Provide food preferences within therapeutic diet when requested.

## 2017-03-17 LAB
ANION GAP SERPL CALC-SCNC: 13 MMOL/L — SIGNIFICANT CHANGE UP (ref 5–17)
ANION GAP SERPL CALC-SCNC: 16 MMOL/L — SIGNIFICANT CHANGE UP (ref 5–17)
BUN SERPL-MCNC: 36 MG/DL — HIGH (ref 7–23)
BUN SERPL-MCNC: 39 MG/DL — HIGH (ref 7–23)
CALCIUM SERPL-MCNC: 9.4 MG/DL — SIGNIFICANT CHANGE UP (ref 8.4–10.5)
CALCIUM SERPL-MCNC: 9.7 MG/DL — SIGNIFICANT CHANGE UP (ref 8.4–10.5)
CHLORIDE SERPL-SCNC: 96 MMOL/L — SIGNIFICANT CHANGE UP (ref 96–108)
CHLORIDE SERPL-SCNC: 98 MMOL/L — SIGNIFICANT CHANGE UP (ref 96–108)
CO2 SERPL-SCNC: 22 MMOL/L — SIGNIFICANT CHANGE UP (ref 22–31)
CO2 SERPL-SCNC: 23 MMOL/L — SIGNIFICANT CHANGE UP (ref 22–31)
CREAT SERPL-MCNC: 1.19 MG/DL — SIGNIFICANT CHANGE UP (ref 0.5–1.3)
CREAT SERPL-MCNC: 1.24 MG/DL — SIGNIFICANT CHANGE UP (ref 0.5–1.3)
GLUCOSE SERPL-MCNC: 265 MG/DL — HIGH (ref 70–99)
GLUCOSE SERPL-MCNC: 518 MG/DL — CRITICAL HIGH (ref 70–99)
HCT VFR BLD CALC: 38.4 % — LOW (ref 39–50)
HGB BLD-MCNC: 13.2 G/DL — SIGNIFICANT CHANGE UP (ref 13–17)
MCHC RBC-ENTMCNC: 31 PG — SIGNIFICANT CHANGE UP (ref 27–34)
MCHC RBC-ENTMCNC: 34.4 GM/DL — SIGNIFICANT CHANGE UP (ref 32–36)
MCV RBC AUTO: 90.1 FL — SIGNIFICANT CHANGE UP (ref 80–100)
PLATELET # BLD AUTO: 284 K/UL — SIGNIFICANT CHANGE UP (ref 150–400)
POTASSIUM SERPL-MCNC: 5.1 MMOL/L — SIGNIFICANT CHANGE UP (ref 3.5–5.3)
POTASSIUM SERPL-MCNC: 5.4 MMOL/L — HIGH (ref 3.5–5.3)
POTASSIUM SERPL-SCNC: 5.1 MMOL/L — SIGNIFICANT CHANGE UP (ref 3.5–5.3)
POTASSIUM SERPL-SCNC: 5.4 MMOL/L — HIGH (ref 3.5–5.3)
RBC # BLD: 4.26 M/UL — SIGNIFICANT CHANGE UP (ref 4.2–5.8)
RBC # FLD: 12.8 % — SIGNIFICANT CHANGE UP (ref 10.3–14.5)
SODIUM SERPL-SCNC: 133 MMOL/L — LOW (ref 135–145)
SODIUM SERPL-SCNC: 135 MMOL/L — SIGNIFICANT CHANGE UP (ref 135–145)
WBC # BLD: 12.78 K/UL — HIGH (ref 3.8–10.5)
WBC # FLD AUTO: 12.78 K/UL — HIGH (ref 3.8–10.5)

## 2017-03-17 RX ORDER — INSULIN LISPRO 100/ML
6 VIAL (ML) SUBCUTANEOUS
Qty: 0 | Refills: 0 | Status: DISCONTINUED | OUTPATIENT
Start: 2017-03-17 | End: 2017-03-18

## 2017-03-17 RX ADMIN — Medication 3 MILLILITER(S): at 09:48

## 2017-03-17 RX ADMIN — Medication 20 MILLIGRAM(S): at 17:30

## 2017-03-17 RX ADMIN — Medication 3 MILLILITER(S): at 15:32

## 2017-03-17 RX ADMIN — INSULIN GLARGINE 14 UNIT(S): 100 INJECTION, SOLUTION SUBCUTANEOUS at 22:29

## 2017-03-17 RX ADMIN — SACUBITRIL AND VALSARTAN 1 TABLET(S): 24; 26 TABLET, FILM COATED ORAL at 18:48

## 2017-03-17 RX ADMIN — Medication 20 MILLIGRAM(S): at 11:54

## 2017-03-17 RX ADMIN — Medication 3 MILLILITER(S): at 18:48

## 2017-03-17 RX ADMIN — Medication 0.5 MILLIGRAM(S): at 06:18

## 2017-03-17 RX ADMIN — TAMSULOSIN HYDROCHLORIDE 0.4 MILLIGRAM(S): 0.4 CAPSULE ORAL at 21:35

## 2017-03-17 RX ADMIN — Medication 50 MILLIGRAM(S): at 06:18

## 2017-03-17 RX ADMIN — Medication 200 MILLIGRAM(S): at 15:30

## 2017-03-17 RX ADMIN — Medication 0.5 MILLIGRAM(S): at 17:27

## 2017-03-17 RX ADMIN — Medication 4: at 21:35

## 2017-03-17 RX ADMIN — Medication 8: at 17:26

## 2017-03-17 RX ADMIN — SPIRONOLACTONE 25 MILLIGRAM(S): 25 TABLET, FILM COATED ORAL at 06:18

## 2017-03-17 RX ADMIN — Medication 12: at 11:53

## 2017-03-17 RX ADMIN — Medication 3 MILLILITER(S): at 21:35

## 2017-03-17 RX ADMIN — Medication 3 MILLILITER(S): at 06:18

## 2017-03-17 RX ADMIN — SACUBITRIL AND VALSARTAN 1 TABLET(S): 24; 26 TABLET, FILM COATED ORAL at 06:18

## 2017-03-17 RX ADMIN — Medication 200 MILLIGRAM(S): at 06:18

## 2017-03-17 RX ADMIN — Medication 6 UNIT(S): at 17:27

## 2017-03-17 RX ADMIN — Medication 6 UNIT(S): at 11:53

## 2017-03-17 RX ADMIN — Medication 200 MILLIGRAM(S): at 21:35

## 2017-03-17 RX ADMIN — Medication 8: at 01:02

## 2017-03-17 RX ADMIN — Medication 4: at 06:23

## 2017-03-17 RX ADMIN — Medication 20 MILLIGRAM(S): at 06:18

## 2017-03-18 ENCOUNTER — TRANSCRIPTION ENCOUNTER (OUTPATIENT)
Age: 67
End: 2017-03-18

## 2017-03-18 VITALS — WEIGHT: 181 LBS

## 2017-03-18 LAB
ANION GAP SERPL CALC-SCNC: 14 MMOL/L — SIGNIFICANT CHANGE UP (ref 5–17)
BUN SERPL-MCNC: 37 MG/DL — HIGH (ref 7–23)
CALCIUM SERPL-MCNC: 9.6 MG/DL — SIGNIFICANT CHANGE UP (ref 8.4–10.5)
CHLORIDE SERPL-SCNC: 96 MMOL/L — SIGNIFICANT CHANGE UP (ref 96–108)
CO2 SERPL-SCNC: 22 MMOL/L — SIGNIFICANT CHANGE UP (ref 22–31)
CREAT SERPL-MCNC: 1.21 MG/DL — SIGNIFICANT CHANGE UP (ref 0.5–1.3)
GLUCOSE SERPL-MCNC: 248 MG/DL — HIGH (ref 70–99)
HCT VFR BLD CALC: 39.8 % — SIGNIFICANT CHANGE UP (ref 39–50)
HGB BLD-MCNC: 13.5 G/DL — SIGNIFICANT CHANGE UP (ref 13–17)
MCHC RBC-ENTMCNC: 31 PG — SIGNIFICANT CHANGE UP (ref 27–34)
MCHC RBC-ENTMCNC: 33.9 GM/DL — SIGNIFICANT CHANGE UP (ref 32–36)
MCV RBC AUTO: 91.5 FL — SIGNIFICANT CHANGE UP (ref 80–100)
PLATELET # BLD AUTO: 299 K/UL — SIGNIFICANT CHANGE UP (ref 150–400)
POTASSIUM SERPL-MCNC: 5 MMOL/L — SIGNIFICANT CHANGE UP (ref 3.5–5.3)
POTASSIUM SERPL-SCNC: 5 MMOL/L — SIGNIFICANT CHANGE UP (ref 3.5–5.3)
RBC # BLD: 4.35 M/UL — SIGNIFICANT CHANGE UP (ref 4.2–5.8)
RBC # FLD: 12.6 % — SIGNIFICANT CHANGE UP (ref 10.3–14.5)
SODIUM SERPL-SCNC: 132 MMOL/L — LOW (ref 135–145)
WBC # BLD: 12.48 K/UL — HIGH (ref 3.8–10.5)
WBC # FLD AUTO: 12.48 K/UL — HIGH (ref 3.8–10.5)

## 2017-03-18 PROCEDURE — 82550 ASSAY OF CK (CPK): CPT

## 2017-03-18 PROCEDURE — 82803 BLOOD GASES ANY COMBINATION: CPT

## 2017-03-18 PROCEDURE — 87633 RESP VIRUS 12-25 TARGETS: CPT

## 2017-03-18 PROCEDURE — 85610 PROTHROMBIN TIME: CPT

## 2017-03-18 PROCEDURE — 85730 THROMBOPLASTIN TIME PARTIAL: CPT

## 2017-03-18 PROCEDURE — 82947 ASSAY GLUCOSE BLOOD QUANT: CPT

## 2017-03-18 PROCEDURE — 80048 BASIC METABOLIC PNL TOTAL CA: CPT

## 2017-03-18 PROCEDURE — 71045 X-RAY EXAM CHEST 1 VIEW: CPT

## 2017-03-18 PROCEDURE — 87581 M.PNEUMON DNA AMP PROBE: CPT

## 2017-03-18 PROCEDURE — 96375 TX/PRO/DX INJ NEW DRUG ADDON: CPT

## 2017-03-18 PROCEDURE — 80053 COMPREHEN METABOLIC PANEL: CPT

## 2017-03-18 PROCEDURE — 84484 ASSAY OF TROPONIN QUANT: CPT

## 2017-03-18 PROCEDURE — 83605 ASSAY OF LACTIC ACID: CPT

## 2017-03-18 PROCEDURE — 83880 ASSAY OF NATRIURETIC PEPTIDE: CPT

## 2017-03-18 PROCEDURE — 99291 CRITICAL CARE FIRST HOUR: CPT | Mod: 25

## 2017-03-18 PROCEDURE — 93306 TTE W/DOPPLER COMPLETE: CPT

## 2017-03-18 PROCEDURE — 83036 HEMOGLOBIN GLYCOSYLATED A1C: CPT

## 2017-03-18 PROCEDURE — 71250 CT THORAX DX C-: CPT

## 2017-03-18 PROCEDURE — 83735 ASSAY OF MAGNESIUM: CPT

## 2017-03-18 PROCEDURE — 93005 ELECTROCARDIOGRAM TRACING: CPT

## 2017-03-18 PROCEDURE — 87486 CHLMYD PNEUM DNA AMP PROBE: CPT

## 2017-03-18 PROCEDURE — 82330 ASSAY OF CALCIUM: CPT

## 2017-03-18 PROCEDURE — 85027 COMPLETE CBC AUTOMATED: CPT

## 2017-03-18 PROCEDURE — 82553 CREATINE MB FRACTION: CPT

## 2017-03-18 PROCEDURE — 96374 THER/PROPH/DIAG INJ IV PUSH: CPT

## 2017-03-18 PROCEDURE — 94660 CPAP INITIATION&MGMT: CPT

## 2017-03-18 PROCEDURE — 94640 AIRWAY INHALATION TREATMENT: CPT

## 2017-03-18 PROCEDURE — 87798 DETECT AGENT NOS DNA AMP: CPT

## 2017-03-18 PROCEDURE — 85014 HEMATOCRIT: CPT

## 2017-03-18 PROCEDURE — 84295 ASSAY OF SERUM SODIUM: CPT

## 2017-03-18 PROCEDURE — 84132 ASSAY OF SERUM POTASSIUM: CPT

## 2017-03-18 PROCEDURE — 84100 ASSAY OF PHOSPHORUS: CPT

## 2017-03-18 PROCEDURE — 82435 ASSAY OF BLOOD CHLORIDE: CPT

## 2017-03-18 RX ORDER — ALBUTEROL 90 UG/1
2 AEROSOL, METERED ORAL
Qty: 1 | Refills: 0
Start: 2017-03-18 | End: 2017-04-17

## 2017-03-18 RX ORDER — INSULIN GLARGINE 100 [IU]/ML
14 INJECTION, SOLUTION SUBCUTANEOUS
Qty: 0 | Refills: 0 | COMMUNITY
Start: 2017-03-18

## 2017-03-18 RX ORDER — ALBUTEROL 90 UG/1
2 AEROSOL, METERED ORAL
Qty: 0 | Refills: 0 | COMMUNITY

## 2017-03-18 RX ADMIN — Medication 6 UNIT(S): at 12:03

## 2017-03-18 RX ADMIN — Medication 8: at 12:03

## 2017-03-18 RX ADMIN — Medication 200 MILLIGRAM(S): at 06:46

## 2017-03-18 RX ADMIN — Medication 0.5 MILLIGRAM(S): at 06:06

## 2017-03-18 RX ADMIN — SACUBITRIL AND VALSARTAN 1 TABLET(S): 24; 26 TABLET, FILM COATED ORAL at 06:05

## 2017-03-18 RX ADMIN — Medication 20 MILLIGRAM(S): at 06:06

## 2017-03-18 RX ADMIN — Medication 3 MILLILITER(S): at 06:06

## 2017-03-18 RX ADMIN — Medication 3 MILLILITER(S): at 12:03

## 2017-03-18 RX ADMIN — Medication 200 MILLIGRAM(S): at 14:06

## 2017-03-18 RX ADMIN — Medication 20 MILLIGRAM(S): at 00:03

## 2017-03-18 RX ADMIN — Medication 20 MILLIGRAM(S): at 12:02

## 2017-03-18 RX ADMIN — Medication 8: at 00:08

## 2017-03-18 RX ADMIN — Medication 4: at 06:06

## 2017-03-18 RX ADMIN — SPIRONOLACTONE 25 MILLIGRAM(S): 25 TABLET, FILM COATED ORAL at 06:06

## 2017-03-18 RX ADMIN — Medication 50 MILLIGRAM(S): at 06:06

## 2017-03-18 RX ADMIN — Medication 6 UNIT(S): at 08:43

## 2017-03-18 NOTE — DISCHARGE NOTE ADULT - HOSPITAL COURSE
to be completed by physician Pleasant 66-yo gentleman admitted with SOB, found to be positive for coronavirus and RSV, causing bronchospasms.  Pulmonary consulted. Started on nebs q6h and IV solumedrol.  SOB resolved within 2 days.  He was discharged at baseline condition with prednisone taper and will remain on his maintenance inhalers from before.  He will followup with his pulmonologist within one week from discharge.

## 2017-03-18 NOTE — DISCHARGE NOTE ADULT - CARE PROVIDER_API CALL
Chico Cabrales), Critical Care Medicine; Internal Medicine; [place] [address]  Phone: [phone]  Fax: [phone]

## 2017-03-18 NOTE — DISCHARGE NOTE ADULT - PATIENT PORTAL LINK FT
“You can access the [facility] Patient Portal, offered by [facility], by registering with the following website: [email or web address]”

## 2017-03-18 NOTE — PROVIDER CONTACT NOTE (OTHER) - RECOMMENDATIONS
RN to give coverage and recheck fingerstick at 0000 as ordered.
Please consider any beneficial adjustment to treatment plan.  Will re-check finger stick in 2hours per protocol.
monitor

## 2017-03-18 NOTE — PROVIDER CONTACT NOTE (OTHER) - BACKGROUND
Pt admitted with heart failure and RSV. Pt on IV steroids.
Pt with diabetes type 2.  Currently on IV steroids for treatment of RSV
dx HF, RSV

## 2017-03-18 NOTE — DISCHARGE NOTE ADULT - SECONDARY DIAGNOSIS.
RSV (respiratory syncytial virus infection) Diabetes mellitus type 2 in nonobese BPH (benign prostatic hyperplasia)

## 2017-03-18 NOTE — DISCHARGE NOTE ADULT - MEDICATION SUMMARY - MEDICATIONS TO STOP TAKING
I will STOP taking the medications listed below when I get home from the hospital:    Medrol Dosepak 4 mg oral tablet  -- take as directed for 6 days

## 2017-03-18 NOTE — DISCHARGE NOTE ADULT - PLAN OF CARE
stable Please f/u with your PCP in 4 to 7 days c/w current meds c/w current meds. You have stated that you willl f/u with your PCP and are currently refusing appt with our endocrinologist. Please f/u with your PCP for management of DM

## 2017-03-18 NOTE — DISCHARGE NOTE ADULT - CARE PLAN
Principal Discharge DX:	CHF exacerbation  Goal:	stable  Instructions for follow-up, activity and diet:	Please f/u with your PCP in 4 to 7 days  Secondary Diagnosis:	RSV (respiratory syncytial virus infection)  Goal:	stable  Instructions for follow-up, activity and diet:	c/w current meds  Secondary Diagnosis:	Diabetes mellitus type 2 in nonobese  Goal:	stable  Instructions for follow-up, activity and diet:	c/w current meds. You have stated that you willl f/u with your PCP and are currently refusing appt with our endocrinologist. Please f/u with your PCP for management of DM  Secondary Diagnosis:	BPH (benign prostatic hyperplasia)  Goal:	stable  Instructions for follow-up, activity and diet:	c/w current meds

## 2017-03-18 NOTE — DISCHARGE NOTE ADULT - MEDICATION SUMMARY - MEDICATIONS TO TAKE
I will START or STAY ON the medications listed below when I get home from the hospital:    predniSONE 10 mg oral tablet  -- 4 tab(s) by mouth once a day x 5 days   3 tabs once a day x 5 days   2 tabs once a day x 5 days   1 tab once day x 5 days   -- It is very important that you take or use this exactly as directed.  Do not skip doses or discontinue unless directed by your doctor.  Obtain medical advice before taking any non-prescription drugs as some may affect the action of this medication.  Take with food or milk.    -- Indication: For Steroid     spironolactone 25 mg oral tablet  -- 1 tab(s) by mouth once a day  -- Indication: For Heart failure    Entresto 97 mg-103 mg oral tablet  -- 1 tab(s) by mouth 2 times a day  -- Indication: For Heart failure    tamsulosin 0.4 mg oral capsule  -- 1 cap(s) by mouth once a day  -- Indication: For BPH (benign prostatic hyperplasia)    glyBURIDE-metFORMIN 2.5 mg-500 mg oral tablet  -- 2 tab(s) by mouth once a day in the morning and 2 tabs in the evening  -- Indication: For DM    Lantus 100 units/mL subcutaneous solution  -- 28 unit(s) subcutaneous once a day  -- Indication: For Dm    benzonatate 100 mg oral capsule  -- 2 cap(s) by mouth 3 times a day  -- Indication: For Cough     Toprol-XL 50 mg oral tablet, extended release  -- 1 tab(s) by mouth once a day  -- Indication: For Heart failure    ProAir HFA 90 mcg/inh inhalation aerosol  -- 2 puff(s) inhaled 4 times a day  -- Indication: For Respiratory     Symbicort 160 mcg-4.5 mcg/inh inhalation aerosol  -- 2 puff(s) inhaled 2 times a day  -- Indication: For Respiratory I will START or STAY ON the medications listed below when I get home from the hospital:    predniSONE 10 mg oral tablet  -- 4 tab(s) by mouth once a day x 5 days   3 tabs once a day x 5 days   2 tabs once a day x 5 days   1 tab once day x 5 days   -- It is very important that you take or use this exactly as directed.  Do not skip doses or discontinue unless directed by your doctor.  Obtain medical advice before taking any non-prescription drugs as some may affect the action of this medication.  Take with food or milk.    -- Indication: For Steroid     spironolactone 25 mg oral tablet  -- 1 tab(s) by mouth once a day  -- Indication: For Heart failure    Entresto 97 mg-103 mg oral tablet  -- 1 tab(s) by mouth 2 times a day  -- Indication: For Heart failure    tamsulosin 0.4 mg oral capsule  -- 1 cap(s) by mouth once a day  -- Indication: For BPH (benign prostatic hyperplasia)    glyBURIDE-metFORMIN 2.5 mg-500 mg oral tablet  -- 2 tab(s) by mouth once a day in the morning and 2 tabs in the evening  -- Indication: For DM    Lantus 100 units/mL subcutaneous solution  -- 28 unit(s) subcutaneous once a day  -- Indication: For Dm    benzonatate 100 mg oral capsule  -- 2 cap(s) by mouth 3 times a day  -- Indication: For Cough    Toprol-XL 50 mg oral tablet, extended release  -- 1 tab(s) by mouth once a day  -- Indication: For Heart failure    Symbicort 160 mcg-4.5 mcg/inh inhalation aerosol  -- 2 puff(s) inhaled 2 times a day  -- Indication: For Respiratory    ProAir HFA 90 mcg/inh inhalation aerosol  -- 2 puff(s) inhaled 4 times a day  -- Indication: For Respiratory

## 2017-03-18 NOTE — PROVIDER CONTACT NOTE (OTHER) - ACTION/TREATMENT ORDERED:
RN to give coverage and recheck fingerstick at 0000 as ordered.
Give 12units of humalog now in addition to 6 units  administered. NP will change sliding scale  from low to mod and will consult with Dr Wayne if endocrine consult warranted.
monitor

## 2017-03-18 NOTE — PROVIDER CONTACT NOTE (OTHER) - ASSESSMENT
VSS, NO CP
alert and oriented x 4. Asymptomatic hyperglycemia. On low dose insulin sliding scale.  Lantus at bedtime. 6units of humalog given per sliding scale.

## 2018-02-27 NOTE — ED ADULT NURSE NOTE - CAS TRG GEN SKIN COLOR
Bariatric Behavioral Health Evaluation    Presenting Problem:  Saman Doe    Patient here to increase health, increase mobility, prevent family diseases    Is the patient seeking Bariatric Surgery Eval? Yes  If yes how long have you researched this surgery option  Patient has been researching bariatric surgery for 1 year  Realizes Post- Op Requirements? Yes  Patient has community contacts with bariatric surgery     Pre-morbid level of function and history of present illness:     Psychiatric/Psychological Treatment Diagnosis: Patient denies Axis I diagnosis or treatment    Outpatient Counselor No     Psychiatrist No     Have you had Inpatient Treatment?  No    Family Constellation (include relationship with each and Psych/Med HX)    Other  Patient has uncle with obesity and a grandparent with addiction    Domestic Violence No     Additional comments/stressors related to family/relationships/peer support:      Physical/Psychological Assessment:     Appearance: appropriate  Sociability: average  Affect: appropriate  Mood: calm  Thought Process: coherent  Speech: normal  Content: no impairment  Orientation: person  Yes , place  Yes , time  Yes , normal attention span  Yes , normal memory  Yes   and normal judgement  Yes   Insight: emotional  good    Risk Assessment:         Recommendations:     Risk of Harm to Self or Others:     Observation:     Interviews this interview only    Access to weapons yes     Weapons secured by:  Kept out of the home    Based on the previous information, the client presents the following risk of harm to self or others: low    BARIATRIC SURGERY EDUCATION CHECKLIST    I have received education related to my bariatric surgery process and understand:    Patients may be required to complete a psychiatric evaluation and receive clearance for surgery from their psychiatrist     Patients who undergo weight loss surgery are at higher risk of increased mental health concerns and suicide attempts  Patients may be required to complete a full substance abuse evaluation and then complete all treatment recommendations prior to surgery  If diagnosis of abuse/dependence results, patient may be required to remain sober for one (1) year before having bariatric surgery  Patients on psychiatric medications should check with their provider to discuss psychiatric medications and the changes in absorption  Patient should discuss all time release medications with provider and take all medications as prescribed  The recommendation is that there is no use of  any tobacco products, Hookah or  vapes for the bariatric post-operation patient  Bariatric surgery patients should not consume alcohol as a post-operative patient as it may increase risk of numerous health conditions including but not limited to alcohol abuse and ulcers  There is a possibility of weight regain if patient does not follow all program guidelines and recommendations  Bariatric surgery patients should exercise thirty (30) to sixty (60) minutes per day to maintain post-surgical weight loss  Research indicates that bariatric patients are more successful when they see a therapist for up to two (2) years post-op  Patients will follow all medical and dietary recommendations provided  Patient will keep all scheduled appointments and follow up with their physician for a minimum of five (5) years  Patient will take all vitamins as recommended  Post-operative vitamins are life-long  Patient reviewed Bariatric Surgery Education Checklist and agrees they have received education on these issues   Note:  Patient denies Axis I diagnosis or treatment  Patient educated regarding the impact of nicotine and alcohol on the post bariatric surgery patient  Patient meets criteria for surgery at this program and is referred to physician  Normal for race

## 2019-04-24 PROBLEM — E11.9 TYPE 2 DIABETES MELLITUS WITHOUT COMPLICATIONS: Chronic | Status: ACTIVE | Noted: 2017-03-15

## 2019-04-24 PROBLEM — N40.0 BENIGN PROSTATIC HYPERPLASIA WITHOUT LOWER URINARY TRACT SYMPTOMS: Chronic | Status: ACTIVE | Noted: 2017-03-15

## 2019-04-24 PROBLEM — I51.9 HEART DISEASE, UNSPECIFIED: Chronic | Status: ACTIVE | Noted: 2017-03-15

## 2019-04-24 PROBLEM — D86.9 SARCOIDOSIS, UNSPECIFIED: Chronic | Status: ACTIVE | Noted: 2017-03-15

## 2019-04-24 PROBLEM — J45.909 UNSPECIFIED ASTHMA, UNCOMPLICATED: Chronic | Status: ACTIVE | Noted: 2017-03-15

## 2019-04-29 ENCOUNTER — OUTPATIENT (OUTPATIENT)
Dept: OUTPATIENT SERVICES | Facility: HOSPITAL | Age: 69
LOS: 1 days | End: 2019-04-29
Payer: MEDICARE

## 2019-04-29 ENCOUNTER — APPOINTMENT (OUTPATIENT)
Dept: CT IMAGING | Facility: IMAGING CENTER | Age: 69
End: 2019-04-29
Payer: MEDICARE

## 2019-04-29 DIAGNOSIS — J45.41 MODERATE PERSISTENT ASTHMA WITH (ACUTE) EXACERBATION: ICD-10-CM

## 2019-04-29 DIAGNOSIS — Z95.810 PRESENCE OF AUTOMATIC (IMPLANTABLE) CARDIAC DEFIBRILLATOR: Chronic | ICD-10-CM

## 2019-04-29 DIAGNOSIS — J20.9 ACUTE BRONCHITIS, UNSPECIFIED: ICD-10-CM

## 2019-04-29 PROCEDURE — 71250 CT THORAX DX C-: CPT | Mod: 26

## 2019-04-29 PROCEDURE — 71250 CT THORAX DX C-: CPT

## 2019-06-27 NOTE — ED ADULT NURSE NOTE - GASTROINTESTINAL WDL
Refill Request:   Medication: Norco, phentermine  Last seen: 5/8/19 for CPX  Last refilled: 6/4/19 x 1 month  PDMP reviewed,  No aberrant behavior noted other than that patient has filled medications at several pharmacies.  Next appointment:   Recent labs:   Rx set up to e prescribe to pharmacy upon physician approval and close.   Okay to refill?.        Abdomen soft, nontender, nondistended, bowel sounds present in all 4 quadrants.

## 2021-02-03 ENCOUNTER — OUTPATIENT (OUTPATIENT)
Dept: OUTPATIENT SERVICES | Facility: HOSPITAL | Age: 71
LOS: 1 days | End: 2021-02-03
Payer: MEDICARE

## 2021-02-03 ENCOUNTER — APPOINTMENT (OUTPATIENT)
Dept: CT IMAGING | Facility: IMAGING CENTER | Age: 71
End: 2021-02-03
Payer: MEDICARE

## 2021-02-03 DIAGNOSIS — D86.9 SARCOIDOSIS, UNSPECIFIED: ICD-10-CM

## 2021-02-03 DIAGNOSIS — Z95.810 PRESENCE OF AUTOMATIC (IMPLANTABLE) CARDIAC DEFIBRILLATOR: Chronic | ICD-10-CM

## 2021-02-03 PROCEDURE — 71250 CT THORAX DX C-: CPT | Mod: 26,MH

## 2021-02-03 PROCEDURE — 71250 CT THORAX DX C-: CPT

## 2022-04-25 ENCOUNTER — INPATIENT (INPATIENT)
Facility: HOSPITAL | Age: 72
LOS: 4 days | Discharge: HOME HEALTH | DRG: 871 | End: 2022-04-30
Attending: HOSPITALIST | Admitting: HOSPITALIST
Payer: MEDICARE

## 2022-04-25 VITALS — OXYGEN SATURATION: 83 % | RESPIRATION RATE: 42 BRPM | HEART RATE: 140 BPM | HEIGHT: 73 IN

## 2022-04-25 DIAGNOSIS — I50.9 HEART FAILURE, UNSPECIFIED: ICD-10-CM

## 2022-04-25 DIAGNOSIS — Z95.810 PRESENCE OF AUTOMATIC (IMPLANTABLE) CARDIAC DEFIBRILLATOR: Chronic | ICD-10-CM

## 2022-04-25 LAB
ALBUMIN SERPL ELPH-MCNC: 4.8 G/DL — SIGNIFICANT CHANGE UP (ref 3.3–5)
ALP SERPL-CCNC: 141 U/L — HIGH (ref 40–120)
ALT FLD-CCNC: 97 U/L — HIGH (ref 10–45)
ANION GAP SERPL CALC-SCNC: 19 MMOL/L — HIGH (ref 5–17)
ANISOCYTOSIS BLD QL: SLIGHT — SIGNIFICANT CHANGE UP
APPEARANCE UR: CLEAR — SIGNIFICANT CHANGE UP
APTT BLD: 26.9 SEC — LOW (ref 27.5–35.5)
AST SERPL-CCNC: 29 U/L — SIGNIFICANT CHANGE UP (ref 10–40)
B-OH-BUTYR SERPL-SCNC: 0.1 MMOL/L — SIGNIFICANT CHANGE UP
BASE EXCESS BLDV CALC-SCNC: 0 MMOL/L — SIGNIFICANT CHANGE UP (ref -2–2)
BASOPHILS # BLD AUTO: 0.29 K/UL — HIGH (ref 0–0.2)
BASOPHILS NFR BLD AUTO: 1.8 % — SIGNIFICANT CHANGE UP (ref 0–2)
BILIRUB SERPL-MCNC: 0.4 MG/DL — SIGNIFICANT CHANGE UP (ref 0.2–1.2)
BILIRUB UR-MCNC: NEGATIVE — SIGNIFICANT CHANGE UP
BUN SERPL-MCNC: 60 MG/DL — HIGH (ref 7–23)
CA-I SERPL-SCNC: 1.45 MMOL/L — HIGH (ref 1.15–1.33)
CALCIUM SERPL-MCNC: 11.3 MG/DL — HIGH (ref 8.4–10.5)
CHLORIDE BLDV-SCNC: 93 MMOL/L — LOW (ref 96–108)
CHLORIDE SERPL-SCNC: 92 MMOL/L — LOW (ref 96–108)
CO2 BLDV-SCNC: 28 MMOL/L — HIGH (ref 22–26)
CO2 SERPL-SCNC: 21 MMOL/L — LOW (ref 22–31)
COLOR SPEC: SIGNIFICANT CHANGE UP
CREAT SERPL-MCNC: 3.06 MG/DL — HIGH (ref 0.5–1.3)
DIFF PNL FLD: NEGATIVE — SIGNIFICANT CHANGE UP
EGFR: 21 ML/MIN/1.73M2 — LOW
EOSINOPHIL # BLD AUTO: 0 K/UL — SIGNIFICANT CHANGE UP (ref 0–0.5)
EOSINOPHIL NFR BLD AUTO: 0 % — SIGNIFICANT CHANGE UP (ref 0–6)
GAS PNL BLDV: 128 MMOL/L — LOW (ref 136–145)
GAS PNL BLDV: SIGNIFICANT CHANGE UP
GLUCOSE BLDV-MCNC: 498 MG/DL — CRITICAL HIGH (ref 70–99)
GLUCOSE SERPL-MCNC: 482 MG/DL — CRITICAL HIGH (ref 70–99)
GLUCOSE UR QL: ABNORMAL
HCO3 BLDV-SCNC: 26 MMOL/L — SIGNIFICANT CHANGE UP (ref 22–29)
HCT VFR BLD CALC: 38.1 % — LOW (ref 39–50)
HCT VFR BLDA CALC: 40 % — SIGNIFICANT CHANGE UP (ref 39–51)
HGB BLD CALC-MCNC: 13.3 G/DL — SIGNIFICANT CHANGE UP (ref 12.6–17.4)
HGB BLD-MCNC: 13 G/DL — SIGNIFICANT CHANGE UP (ref 13–17)
INR BLD: 0.96 RATIO — SIGNIFICANT CHANGE UP (ref 0.88–1.16)
KETONES UR-MCNC: NEGATIVE — SIGNIFICANT CHANGE UP
LACTATE BLDV-MCNC: 2.9 MMOL/L — HIGH (ref 0.7–2)
LEUKOCYTE ESTERASE UR-ACNC: NEGATIVE — SIGNIFICANT CHANGE UP
LYMPHOCYTES # BLD AUTO: 1.26 K/UL — SIGNIFICANT CHANGE UP (ref 1–3.3)
LYMPHOCYTES # BLD AUTO: 7.9 % — LOW (ref 13–44)
MACROCYTES BLD QL: SLIGHT — SIGNIFICANT CHANGE UP
MANUAL SMEAR VERIFICATION: SIGNIFICANT CHANGE UP
MCHC RBC-ENTMCNC: 30.7 PG — SIGNIFICANT CHANGE UP (ref 27–34)
MCHC RBC-ENTMCNC: 34.1 GM/DL — SIGNIFICANT CHANGE UP (ref 32–36)
MCV RBC AUTO: 90.1 FL — SIGNIFICANT CHANGE UP (ref 80–100)
MONOCYTES # BLD AUTO: 0.99 K/UL — HIGH (ref 0–0.9)
MONOCYTES NFR BLD AUTO: 6.2 % — SIGNIFICANT CHANGE UP (ref 2–14)
NEUTROPHILS # BLD AUTO: 13.37 K/UL — HIGH (ref 1.8–7.4)
NEUTROPHILS NFR BLD AUTO: 84.1 % — HIGH (ref 43–77)
NITRITE UR-MCNC: NEGATIVE — SIGNIFICANT CHANGE UP
NT-PROBNP SERPL-SCNC: 7795 PG/ML — HIGH (ref 0–300)
PCO2 BLDV: 49 MMHG — SIGNIFICANT CHANGE UP (ref 42–55)
PH BLDV: 7.34 — SIGNIFICANT CHANGE UP (ref 7.32–7.43)
PH UR: 6.5 — SIGNIFICANT CHANGE UP (ref 5–8)
PLAT MORPH BLD: NORMAL — SIGNIFICANT CHANGE UP
PLATELET # BLD AUTO: 370 K/UL — SIGNIFICANT CHANGE UP (ref 150–400)
PO2 BLDV: 22 MMHG — LOW (ref 25–45)
POIKILOCYTOSIS BLD QL AUTO: SLIGHT — SIGNIFICANT CHANGE UP
POTASSIUM BLDV-SCNC: 4.7 MMOL/L — SIGNIFICANT CHANGE UP (ref 3.5–5.1)
POTASSIUM SERPL-MCNC: 4.6 MMOL/L — SIGNIFICANT CHANGE UP (ref 3.5–5.3)
POTASSIUM SERPL-SCNC: 4.6 MMOL/L — SIGNIFICANT CHANGE UP (ref 3.5–5.3)
PROCALCITONIN SERPL-MCNC: 0.1 NG/ML — SIGNIFICANT CHANGE UP (ref 0.02–0.1)
PROT SERPL-MCNC: 8.4 G/DL — HIGH (ref 6–8.3)
PROT UR-MCNC: NEGATIVE — SIGNIFICANT CHANGE UP
PROTHROM AB SERPL-ACNC: 11.1 SEC — SIGNIFICANT CHANGE UP (ref 10.5–13.4)
RBC # BLD: 4.23 M/UL — SIGNIFICANT CHANGE UP (ref 4.2–5.8)
RBC # FLD: 14 % — SIGNIFICANT CHANGE UP (ref 10.3–14.5)
RBC BLD AUTO: ABNORMAL
SAO2 % BLDV: 30.8 % — LOW (ref 67–88)
SODIUM SERPL-SCNC: 132 MMOL/L — LOW (ref 135–145)
SP GR SPEC: 1.02 — SIGNIFICANT CHANGE UP (ref 1.01–1.02)
STOMATOCYTES BLD QL SMEAR: SLIGHT — SIGNIFICANT CHANGE UP
UROBILINOGEN FLD QL: NEGATIVE — SIGNIFICANT CHANGE UP
WBC # BLD: 15.9 K/UL — HIGH (ref 3.8–10.5)
WBC # FLD AUTO: 15.9 K/UL — HIGH (ref 3.8–10.5)

## 2022-04-25 PROCEDURE — 71045 X-RAY EXAM CHEST 1 VIEW: CPT | Mod: 26

## 2022-04-25 PROCEDURE — 99291 CRITICAL CARE FIRST HOUR: CPT | Mod: CS,GC

## 2022-04-25 PROCEDURE — 93308 TTE F-UP OR LMTD: CPT | Mod: 26

## 2022-04-25 RX ORDER — INSULIN LISPRO 100/ML
5 VIAL (ML) SUBCUTANEOUS ONCE
Refills: 0 | Status: COMPLETED | OUTPATIENT
Start: 2022-04-25 | End: 2022-04-25

## 2022-04-25 RX ORDER — FUROSEMIDE 40 MG
40 TABLET ORAL ONCE
Refills: 0 | Status: COMPLETED | OUTPATIENT
Start: 2022-04-25 | End: 2022-04-25

## 2022-04-25 RX ORDER — NITROGLYCERIN 6.5 MG
0.4 CAPSULE, EXTENDED RELEASE ORAL ONCE
Refills: 0 | Status: COMPLETED | OUTPATIENT
Start: 2022-04-25 | End: 2022-04-25

## 2022-04-25 RX ADMIN — Medication 40 MILLIGRAM(S): at 22:00

## 2022-04-25 RX ADMIN — Medication 0.4 MILLIGRAM(S): at 21:40

## 2022-04-25 RX ADMIN — Medication 5 UNIT(S): at 23:44

## 2022-04-25 NOTE — ED PROVIDER NOTE - CARE PLAN
1 Principal Discharge DX:	Acute decompensated heart failure   Principal Discharge DX:	Acute decompensated heart failure  Secondary Diagnosis:	IRWIN (acute kidney injury)  Secondary Diagnosis:	Acute respiratory failure with hypoxia

## 2022-04-25 NOTE — ED PROVIDER NOTE - CLINICAL SUMMARY MEDICAL DECISION MAKING FREE TEXT BOX
JORDANA Kahn. PGY-3: 71 y/o M presenting with acute worsening of SOB, c/f acute decompensated HF, diffuse B-lines on POCUS, was not DC with diuretics after recent admission. Given CP will also consider ACS. Breathing improved on BIPAPS. Labs, trop, given IV lasix, TBA for tele monitoring and diuresis. JORDANA Kahn. PGY-3: 71 y/o M presenting with acute worsening of SOB, c/f acute decompensated HF, diffuse B-lines on POCUS, was not DC with diuretics after recent admission. Given CP will also consider ACS. Breathing improved on BIPAPS. Labs, trop, given IV lasix, TBA for tele monitoring and diuresis.  Attending Katarina Che: 71 yo male with multiple medica issues recently admitted at [facility] presenting with worsening sob and difficulty breathing. upon arrival pt ill appearing hypoxic and tachypnic. pocus performed showing diffuse anterior b lines. pt with h/o chf and CKD. suspect likely pulmonary edema. pt was recently taken off diuretic. placed on bipap with improvement in oxygenation. no reports of fevers or chills. will obtain labs, diuresis, rvp. monitor respiraotry status.

## 2022-04-25 NOTE — ED PROVIDER NOTE - PHYSICAL EXAMINATION
gen: in distress  Mentation: AAO x 3  psych: mood appropriate  ENT: airway patent  Eyes: conjunctivae clear bilaterally  Cardio: tachy  Resp: rales b/l  GI: s/nt/nd  Neuro: sensation and motor function intact  MSK: normal movement of all extremities  Lymph/Vasc: 2+ LE pitting edema gen: in distress  Mentation: AAO x 3  psych: mood appropriate  ENT: airway patent  Eyes: conjunctivae clear bilaterally  Cardio: tachy  Resp: rales b/l  GI: s/nt/nd  Neuro: sensation and motor function intact  MSK: normal movement of all extremities  Lymph/Vasc: 2+ LE pitting edema  Attending Katarina Che: Gen: NAD, heent: atrauamtic, eomi, perrla, mmm, op pink, uvula midline, neck; nttp, no nuchal rigidity, chest: nttp, no crepitus, cv: tachcayrdic +murmur, lungs: diffuse wheezing, abd: soft, nontender, nondistended, no peritoneal signs,  no guarding, ext: wwp, neg homans, skin: no rash, neuro: awake and alert, following commands, speech clear, sensation and strength intact, no focal deficits

## 2022-04-25 NOTE — ED PROVIDER NOTE - ATTENDING CONTRIBUTION TO CARE
6 Attending MD Katarina Che:  I personally have seen and examined this patient.  Resident note reviewed and agree on plan of care and except where noted.  See HPI, PE, and MDM for details.

## 2022-04-25 NOTE — ED PROVIDER NOTE - PROGRESS NOTE DETAILS
JORDANA Kahn. PGY-3: patient improved with BIPAP, TBA pending results  Patient's primary/cardiologist is Irvin Bell with [facility] Attending Katarina Che: pt feeling better on bipap. blood work shows sig hyperglcyemia and hypercalcemia, pt with h/o ?sarcoid. creatinine when left other hospital reportedly 3.5

## 2022-04-25 NOTE — ED PROVIDER NOTE - OBJECTIVE STATEMENT
73 y/o M with PMH of HTN, CHF s/p AICD, IDDM, sarcoidosis presenting with SOB. Patient states felt slightly short of breath this AM but acutely worsened appx 1 hour prior to arrival. Has associated chest pain. States was recently DC from [facility]SH 3 days ago for "fluid in lungs". States he was getting diuretics while in hospital but was not prescribed diuretic for home.   No fevers, chills, n/v, 71 y/o M with PMH of HTN, CHF s/p AICD, IDDM, CKD, sarcoidosis presenting with SOB. Patient states felt slightly short of breath this AM but acutely worsened appx 1 hour prior to arrival. Has associated chest pain. States was recently DC from OSH 3 days ago for "fluid in lungs". States he was getting diuretics while in hospital but was not prescribed diuretic for home.   No fevers, chills, n/v,

## 2022-04-26 DIAGNOSIS — A41.9 SEPSIS, UNSPECIFIED ORGANISM: ICD-10-CM

## 2022-04-26 DIAGNOSIS — I50.9 HEART FAILURE, UNSPECIFIED: ICD-10-CM

## 2022-04-26 DIAGNOSIS — U07.1 COVID-19: ICD-10-CM

## 2022-04-26 DIAGNOSIS — D86.9 SARCOIDOSIS, UNSPECIFIED: ICD-10-CM

## 2022-04-26 DIAGNOSIS — J96.01 ACUTE RESPIRATORY FAILURE WITH HYPOXIA: ICD-10-CM

## 2022-04-26 DIAGNOSIS — N17.9 ACUTE KIDNEY FAILURE, UNSPECIFIED: ICD-10-CM

## 2022-04-26 DIAGNOSIS — R07.9 CHEST PAIN, UNSPECIFIED: ICD-10-CM

## 2022-04-26 DIAGNOSIS — R73.9 HYPERGLYCEMIA, UNSPECIFIED: ICD-10-CM

## 2022-04-26 LAB
24R-OH-CALCIDIOL SERPL-MCNC: 28.9 NG/ML — LOW (ref 30–80)
A1C WITH ESTIMATED AVERAGE GLUCOSE RESULT: 7.6 % — HIGH (ref 4–5.6)
ALBUMIN SERPL ELPH-MCNC: 4.1 G/DL — SIGNIFICANT CHANGE UP (ref 3.3–5)
ALBUMIN SERPL ELPH-MCNC: 4.5 G/DL — SIGNIFICANT CHANGE UP (ref 3.3–5)
ALP SERPL-CCNC: 109 U/L — SIGNIFICANT CHANGE UP (ref 40–120)
ALP SERPL-CCNC: 123 U/L — HIGH (ref 40–120)
ALT FLD-CCNC: 78 U/L — HIGH (ref 10–45)
ALT FLD-CCNC: 91 U/L — HIGH (ref 10–45)
ANION GAP SERPL CALC-SCNC: 17 MMOL/L — SIGNIFICANT CHANGE UP (ref 5–17)
ANION GAP SERPL CALC-SCNC: 18 MMOL/L — HIGH (ref 5–17)
APTT BLD: 23.9 SEC — LOW (ref 27.5–35.5)
AST SERPL-CCNC: 19 U/L — SIGNIFICANT CHANGE UP (ref 10–40)
AST SERPL-CCNC: 27 U/L — SIGNIFICANT CHANGE UP (ref 10–40)
B-OH-BUTYR SERPL-SCNC: 0.1 MMOL/L — SIGNIFICANT CHANGE UP
BASE EXCESS BLDV CALC-SCNC: 0.1 MMOL/L — SIGNIFICANT CHANGE UP (ref -2–2)
BASOPHILS # BLD AUTO: 0.02 K/UL — SIGNIFICANT CHANGE UP (ref 0–0.2)
BASOPHILS NFR BLD AUTO: 0.2 % — SIGNIFICANT CHANGE UP (ref 0–2)
BILIRUB SERPL-MCNC: 0.4 MG/DL — SIGNIFICANT CHANGE UP (ref 0.2–1.2)
BILIRUB SERPL-MCNC: 0.5 MG/DL — SIGNIFICANT CHANGE UP (ref 0.2–1.2)
BUN SERPL-MCNC: 61 MG/DL — HIGH (ref 7–23)
BUN SERPL-MCNC: 67 MG/DL — HIGH (ref 7–23)
BUN SERPL-MCNC: 68 MG/DL — HIGH (ref 7–23)
BUN SERPL-MCNC: 78 MG/DL — HIGH (ref 7–23)
CA-I SERPL-SCNC: 1.35 MMOL/L — HIGH (ref 1.15–1.33)
CALCIUM SERPL-MCNC: 10.3 MG/DL — SIGNIFICANT CHANGE UP (ref 8.4–10.5)
CALCIUM SERPL-MCNC: 10.6 MG/DL — HIGH (ref 8.4–10.5)
CALCIUM SERPL-MCNC: 10.8 MG/DL — HIGH (ref 8.4–10.5)
CALCIUM SERPL-MCNC: 10.8 MG/DL — HIGH (ref 8.4–10.5)
CALCIUM SERPL-MCNC: 11.1 MG/DL — HIGH (ref 8.4–10.5)
CHLORIDE BLDV-SCNC: 95 MMOL/L — LOW (ref 96–108)
CHLORIDE SERPL-SCNC: 91 MMOL/L — LOW (ref 96–108)
CHLORIDE SERPL-SCNC: 92 MMOL/L — LOW (ref 96–108)
CHLORIDE SERPL-SCNC: 94 MMOL/L — LOW (ref 96–108)
CHLORIDE SERPL-SCNC: 95 MMOL/L — LOW (ref 96–108)
CK SERPL-CCNC: 32 U/L — SIGNIFICANT CHANGE UP (ref 30–200)
CO2 BLDV-SCNC: 26 MMOL/L — SIGNIFICANT CHANGE UP (ref 22–26)
CO2 SERPL-SCNC: 19 MMOL/L — LOW (ref 22–31)
CO2 SERPL-SCNC: 20 MMOL/L — LOW (ref 22–31)
CO2 SERPL-SCNC: 21 MMOL/L — LOW (ref 22–31)
CO2 SERPL-SCNC: 21 MMOL/L — LOW (ref 22–31)
CREAT SERPL-MCNC: 2.97 MG/DL — HIGH (ref 0.5–1.3)
CREAT SERPL-MCNC: 2.99 MG/DL — HIGH (ref 0.5–1.3)
CREAT SERPL-MCNC: 3.03 MG/DL — HIGH (ref 0.5–1.3)
CREAT SERPL-MCNC: 3.12 MG/DL — HIGH (ref 0.5–1.3)
EGFR: 21 ML/MIN/1.73M2 — LOW
EGFR: 21 ML/MIN/1.73M2 — LOW
EGFR: 22 ML/MIN/1.73M2 — LOW
EGFR: 22 ML/MIN/1.73M2 — LOW
EOSINOPHIL # BLD AUTO: 0.01 K/UL — SIGNIFICANT CHANGE UP (ref 0–0.5)
EOSINOPHIL NFR BLD AUTO: 0.1 % — SIGNIFICANT CHANGE UP (ref 0–6)
ESTIMATED AVERAGE GLUCOSE: 171 MG/DL — HIGH (ref 68–114)
FERRITIN SERPL-MCNC: 237 NG/ML — SIGNIFICANT CHANGE UP (ref 30–400)
GAS PNL BLDV: 128 MMOL/L — LOW (ref 136–145)
GAS PNL BLDV: SIGNIFICANT CHANGE UP
GLUCOSE BLDC GLUCOMTR-MCNC: 379 MG/DL — HIGH (ref 70–99)
GLUCOSE BLDC GLUCOMTR-MCNC: 407 MG/DL — HIGH (ref 70–99)
GLUCOSE BLDC GLUCOMTR-MCNC: 464 MG/DL — CRITICAL HIGH (ref 70–99)
GLUCOSE BLDC GLUCOMTR-MCNC: 473 MG/DL — CRITICAL HIGH (ref 70–99)
GLUCOSE BLDC GLUCOMTR-MCNC: 483 MG/DL — CRITICAL HIGH (ref 70–99)
GLUCOSE BLDC GLUCOMTR-MCNC: 495 MG/DL — CRITICAL HIGH (ref 70–99)
GLUCOSE BLDC GLUCOMTR-MCNC: 536 MG/DL — CRITICAL HIGH (ref 70–99)
GLUCOSE BLDC GLUCOMTR-MCNC: 549 MG/DL — CRITICAL HIGH (ref 70–99)
GLUCOSE BLDC GLUCOMTR-MCNC: 570 MG/DL — CRITICAL HIGH (ref 70–99)
GLUCOSE BLDV-MCNC: 515 MG/DL — CRITICAL HIGH (ref 70–99)
GLUCOSE SERPL-MCNC: 395 MG/DL — HIGH (ref 70–99)
GLUCOSE SERPL-MCNC: 446 MG/DL — HIGH (ref 70–99)
GLUCOSE SERPL-MCNC: 492 MG/DL — CRITICAL HIGH (ref 70–99)
GLUCOSE SERPL-MCNC: 498 MG/DL — CRITICAL HIGH (ref 70–99)
HCO3 BLDV-SCNC: 25 MMOL/L — SIGNIFICANT CHANGE UP (ref 22–29)
HCT VFR BLD CALC: 32.7 % — LOW (ref 39–50)
HCT VFR BLDA CALC: 38 % — LOW (ref 39–51)
HGB BLD CALC-MCNC: 12.5 G/DL — LOW (ref 12.6–17.4)
HGB BLD-MCNC: 11.1 G/DL — LOW (ref 13–17)
IMM GRANULOCYTES NFR BLD AUTO: 0.4 % — SIGNIFICANT CHANGE UP (ref 0–1.5)
INR BLD: 1.06 RATIO — SIGNIFICANT CHANGE UP (ref 0.88–1.16)
LACTATE BLDV-MCNC: 2.1 MMOL/L — HIGH (ref 0.7–2)
LDH SERPL L TO P-CCNC: 168 U/L — SIGNIFICANT CHANGE UP (ref 50–242)
LYMPHOCYTES # BLD AUTO: 0.2 K/UL — LOW (ref 1–3.3)
LYMPHOCYTES # BLD AUTO: 1.8 % — LOW (ref 13–44)
MAGNESIUM SERPL-MCNC: 2 MG/DL — SIGNIFICANT CHANGE UP (ref 1.6–2.6)
MCHC RBC-ENTMCNC: 30.8 PG — SIGNIFICANT CHANGE UP (ref 27–34)
MCHC RBC-ENTMCNC: 33.9 GM/DL — SIGNIFICANT CHANGE UP (ref 32–36)
MCV RBC AUTO: 90.8 FL — SIGNIFICANT CHANGE UP (ref 80–100)
MONOCYTES # BLD AUTO: 0.28 K/UL — SIGNIFICANT CHANGE UP (ref 0–0.9)
MONOCYTES NFR BLD AUTO: 2.5 % — SIGNIFICANT CHANGE UP (ref 2–14)
NEUTROPHILS # BLD AUTO: 10.67 K/UL — HIGH (ref 1.8–7.4)
NEUTROPHILS NFR BLD AUTO: 95 % — HIGH (ref 43–77)
NRBC # BLD: 0 /100 WBCS — SIGNIFICANT CHANGE UP (ref 0–0)
NT-PROBNP SERPL-SCNC: HIGH PG/ML (ref 0–300)
PCO2 BLDV: 39 MMHG — LOW (ref 42–55)
PH BLDV: 7.41 — SIGNIFICANT CHANGE UP (ref 7.32–7.43)
PHOSPHATE SERPL-MCNC: 3.7 MG/DL — SIGNIFICANT CHANGE UP (ref 2.5–4.5)
PLATELET # BLD AUTO: 250 K/UL — SIGNIFICANT CHANGE UP (ref 150–400)
PO2 BLDV: 47 MMHG — HIGH (ref 25–45)
POTASSIUM BLDV-SCNC: 4.9 MMOL/L — SIGNIFICANT CHANGE UP (ref 3.5–5.1)
POTASSIUM SERPL-MCNC: 4.6 MMOL/L — SIGNIFICANT CHANGE UP (ref 3.5–5.3)
POTASSIUM SERPL-MCNC: 4.8 MMOL/L — SIGNIFICANT CHANGE UP (ref 3.5–5.3)
POTASSIUM SERPL-MCNC: 4.9 MMOL/L — SIGNIFICANT CHANGE UP (ref 3.5–5.3)
POTASSIUM SERPL-MCNC: 5.2 MMOL/L — SIGNIFICANT CHANGE UP (ref 3.5–5.3)
POTASSIUM SERPL-SCNC: 4.6 MMOL/L — SIGNIFICANT CHANGE UP (ref 3.5–5.3)
POTASSIUM SERPL-SCNC: 4.8 MMOL/L — SIGNIFICANT CHANGE UP (ref 3.5–5.3)
POTASSIUM SERPL-SCNC: 4.9 MMOL/L — SIGNIFICANT CHANGE UP (ref 3.5–5.3)
POTASSIUM SERPL-SCNC: 5.2 MMOL/L — SIGNIFICANT CHANGE UP (ref 3.5–5.3)
PROT SERPL-MCNC: 7.2 G/DL — SIGNIFICANT CHANGE UP (ref 6–8.3)
PROT SERPL-MCNC: 7.7 G/DL — SIGNIFICANT CHANGE UP (ref 6–8.3)
PROTHROM AB SERPL-ACNC: 12.3 SEC — SIGNIFICANT CHANGE UP (ref 10.5–13.4)
PTH-INTACT FLD-MCNC: 7 PG/ML — LOW (ref 15–65)
RAPID RVP RESULT: DETECTED
RBC # BLD: 3.6 M/UL — LOW (ref 4.2–5.8)
RBC # FLD: 13.8 % — SIGNIFICANT CHANGE UP (ref 10.3–14.5)
SAO2 % BLDV: 82.4 % — SIGNIFICANT CHANGE UP (ref 67–88)
SARS-COV-2 RNA SPEC QL NAA+PROBE: DETECTED
SODIUM SERPL-SCNC: 128 MMOL/L — LOW (ref 135–145)
SODIUM SERPL-SCNC: 131 MMOL/L — LOW (ref 135–145)
SODIUM SERPL-SCNC: 131 MMOL/L — LOW (ref 135–145)
SODIUM SERPL-SCNC: 132 MMOL/L — LOW (ref 135–145)
TROPONIN T, HIGH SENSITIVITY RESULT: 76 NG/L — HIGH (ref 0–51)
TSH SERPL-MCNC: 0.62 UIU/ML — SIGNIFICANT CHANGE UP (ref 0.27–4.2)
WBC # BLD: 11.23 K/UL — HIGH (ref 3.8–10.5)
WBC # FLD AUTO: 11.23 K/UL — HIGH (ref 3.8–10.5)

## 2022-04-26 PROCEDURE — 71250 CT THORAX DX C-: CPT | Mod: 26

## 2022-04-26 PROCEDURE — 99223 1ST HOSP IP/OBS HIGH 75: CPT

## 2022-04-26 PROCEDURE — 93306 TTE W/DOPPLER COMPLETE: CPT | Mod: 26

## 2022-04-26 RX ORDER — FAMOTIDINE 10 MG/ML
20 INJECTION INTRAVENOUS DAILY
Refills: 0 | Status: DISCONTINUED | OUTPATIENT
Start: 2022-04-26 | End: 2022-04-30

## 2022-04-26 RX ORDER — METOPROLOL TARTRATE 50 MG
75 TABLET ORAL DAILY
Refills: 0 | Status: DISCONTINUED | OUTPATIENT
Start: 2022-04-26 | End: 2022-04-30

## 2022-04-26 RX ORDER — INSULIN GLARGINE 100 [IU]/ML
20 INJECTION, SOLUTION SUBCUTANEOUS ONCE
Refills: 0 | Status: COMPLETED | OUTPATIENT
Start: 2022-04-26 | End: 2022-04-26

## 2022-04-26 RX ORDER — INSULIN LISPRO 100/ML
VIAL (ML) SUBCUTANEOUS
Refills: 0 | Status: DISCONTINUED | OUTPATIENT
Start: 2022-04-26 | End: 2022-04-26

## 2022-04-26 RX ORDER — ASPIRIN/CALCIUM CARB/MAGNESIUM 324 MG
81 TABLET ORAL DAILY
Refills: 0 | Status: DISCONTINUED | OUTPATIENT
Start: 2022-04-26 | End: 2022-04-30

## 2022-04-26 RX ORDER — FLUTICASONE PROPIONATE AND SALMETEROL 50; 250 UG/1; UG/1
1 POWDER ORAL; RESPIRATORY (INHALATION)
Qty: 0 | Refills: 0 | DISCHARGE

## 2022-04-26 RX ORDER — TAMSULOSIN HYDROCHLORIDE 0.4 MG/1
2 CAPSULE ORAL
Qty: 0 | Refills: 0 | DISCHARGE

## 2022-04-26 RX ORDER — TAMSULOSIN HYDROCHLORIDE 0.4 MG/1
0.8 CAPSULE ORAL AT BEDTIME
Refills: 0 | Status: DISCONTINUED | OUTPATIENT
Start: 2022-04-26 | End: 2022-04-30

## 2022-04-26 RX ORDER — DEXTROSE 50 % IN WATER 50 %
25 SYRINGE (ML) INTRAVENOUS ONCE
Refills: 0 | Status: DISCONTINUED | OUTPATIENT
Start: 2022-04-26 | End: 2022-04-30

## 2022-04-26 RX ORDER — DEXTROSE 50 % IN WATER 50 %
15 SYRINGE (ML) INTRAVENOUS ONCE
Refills: 0 | Status: DISCONTINUED | OUTPATIENT
Start: 2022-04-26 | End: 2022-04-30

## 2022-04-26 RX ORDER — INSULIN GLARGINE 100 [IU]/ML
28 INJECTION, SOLUTION SUBCUTANEOUS
Qty: 0 | Refills: 0 | DISCHARGE

## 2022-04-26 RX ORDER — FAMOTIDINE 10 MG/ML
1 INJECTION INTRAVENOUS
Qty: 0 | Refills: 0 | DISCHARGE

## 2022-04-26 RX ORDER — INSULIN LISPRO 100/ML
VIAL (ML) SUBCUTANEOUS AT BEDTIME
Refills: 0 | Status: DISCONTINUED | OUTPATIENT
Start: 2022-04-26 | End: 2022-04-29

## 2022-04-26 RX ORDER — INSULIN GLARGINE 100 [IU]/ML
13 INJECTION, SOLUTION SUBCUTANEOUS
Qty: 0 | Refills: 0 | DISCHARGE

## 2022-04-26 RX ORDER — SPIRONOLACTONE 25 MG/1
1 TABLET, FILM COATED ORAL
Qty: 0 | Refills: 0 | DISCHARGE

## 2022-04-26 RX ORDER — TAMSULOSIN HYDROCHLORIDE 0.4 MG/1
1 CAPSULE ORAL
Qty: 0 | Refills: 0 | DISCHARGE

## 2022-04-26 RX ORDER — ENOXAPARIN SODIUM 100 MG/ML
28 INJECTION SUBCUTANEOUS
Qty: 0 | Refills: 0 | DISCHARGE

## 2022-04-26 RX ORDER — GLUCAGON INJECTION, SOLUTION 0.5 MG/.1ML
1 INJECTION, SOLUTION SUBCUTANEOUS ONCE
Refills: 0 | Status: DISCONTINUED | OUTPATIENT
Start: 2022-04-26 | End: 2022-04-30

## 2022-04-26 RX ORDER — INSULIN LISPRO 100/ML
5 VIAL (ML) SUBCUTANEOUS
Refills: 0 | Status: DISCONTINUED | OUTPATIENT
Start: 2022-04-26 | End: 2022-04-26

## 2022-04-26 RX ORDER — FUROSEMIDE 40 MG
40 TABLET ORAL
Refills: 0 | Status: DISCONTINUED | OUTPATIENT
Start: 2022-04-26 | End: 2022-04-27

## 2022-04-26 RX ORDER — INSULIN GLARGINE 100 [IU]/ML
25 INJECTION, SOLUTION SUBCUTANEOUS EVERY MORNING
Refills: 0 | Status: DISCONTINUED | OUTPATIENT
Start: 2022-04-27 | End: 2022-04-27

## 2022-04-26 RX ORDER — INSULIN LISPRO 100/ML
VIAL (ML) SUBCUTANEOUS AT BEDTIME
Refills: 0 | Status: DISCONTINUED | OUTPATIENT
Start: 2022-04-26 | End: 2022-04-26

## 2022-04-26 RX ORDER — SODIUM CHLORIDE 9 MG/ML
1000 INJECTION, SOLUTION INTRAVENOUS
Refills: 0 | Status: DISCONTINUED | OUTPATIENT
Start: 2022-04-26 | End: 2022-04-30

## 2022-04-26 RX ORDER — BUDESONIDE AND FORMOTEROL FUMARATE DIHYDRATE 160; 4.5 UG/1; UG/1
2 AEROSOL RESPIRATORY (INHALATION)
Refills: 0 | Status: DISCONTINUED | OUTPATIENT
Start: 2022-04-26 | End: 2022-04-26

## 2022-04-26 RX ORDER — IPRATROPIUM/ALBUTEROL SULFATE 18-103MCG
3 AEROSOL WITH ADAPTER (GRAM) INHALATION EVERY 6 HOURS
Refills: 0 | Status: DISCONTINUED | OUTPATIENT
Start: 2022-04-26 | End: 2022-04-28

## 2022-04-26 RX ORDER — CHLORHEXIDINE GLUCONATE 213 G/1000ML
1 SOLUTION TOPICAL
Refills: 0 | Status: DISCONTINUED | OUTPATIENT
Start: 2022-04-26 | End: 2022-04-30

## 2022-04-26 RX ORDER — AMIODARONE HYDROCHLORIDE 400 MG/1
400 TABLET ORAL DAILY
Refills: 0 | Status: DISCONTINUED | OUTPATIENT
Start: 2022-04-26 | End: 2022-04-27

## 2022-04-26 RX ORDER — DEXTROSE 50 % IN WATER 50 %
12.5 SYRINGE (ML) INTRAVENOUS ONCE
Refills: 0 | Status: DISCONTINUED | OUTPATIENT
Start: 2022-04-26 | End: 2022-04-30

## 2022-04-26 RX ORDER — METOPROLOL TARTRATE 50 MG
1.5 TABLET ORAL
Qty: 0 | Refills: 0 | DISCHARGE

## 2022-04-26 RX ORDER — BUDESONIDE, MICRONIZED 100 %
0.5 POWDER (GRAM) MISCELLANEOUS EVERY 12 HOURS
Refills: 0 | Status: DISCONTINUED | OUTPATIENT
Start: 2022-04-26 | End: 2022-04-28

## 2022-04-26 RX ORDER — METOPROLOL TARTRATE 50 MG
1 TABLET ORAL
Qty: 0 | Refills: 0 | DISCHARGE

## 2022-04-26 RX ORDER — GLYBURIDE-METFORMIN HYDROCHLORIDE 1.25; 25 MG/1; MG/1
2 TABLET ORAL
Qty: 0 | Refills: 0 | DISCHARGE

## 2022-04-26 RX ORDER — SACUBITRIL AND VALSARTAN 24; 26 MG/1; MG/1
1 TABLET, FILM COATED ORAL
Qty: 0 | Refills: 0 | DISCHARGE

## 2022-04-26 RX ORDER — HEPARIN SODIUM 5000 [USP'U]/ML
5000 INJECTION INTRAVENOUS; SUBCUTANEOUS THREE TIMES A DAY
Refills: 0 | Status: DISCONTINUED | OUTPATIENT
Start: 2022-04-26 | End: 2022-04-30

## 2022-04-26 RX ORDER — FAMOTIDINE 10 MG/ML
20 INJECTION INTRAVENOUS
Refills: 0 | Status: DISCONTINUED | OUTPATIENT
Start: 2022-04-26 | End: 2022-04-26

## 2022-04-26 RX ORDER — INSULIN LISPRO 100/ML
10 VIAL (ML) SUBCUTANEOUS
Refills: 0 | Status: DISCONTINUED | OUTPATIENT
Start: 2022-04-26 | End: 2022-04-27

## 2022-04-26 RX ORDER — ASPIRIN/CALCIUM CARB/MAGNESIUM 324 MG
1 TABLET ORAL
Qty: 0 | Refills: 0 | DISCHARGE

## 2022-04-26 RX ORDER — INSULIN LISPRO 100/ML
VIAL (ML) SUBCUTANEOUS
Refills: 0 | Status: DISCONTINUED | OUTPATIENT
Start: 2022-04-26 | End: 2022-04-29

## 2022-04-26 RX ORDER — DEXAMETHASONE 0.5 MG/5ML
6 ELIXIR ORAL DAILY
Refills: 0 | Status: DISCONTINUED | OUTPATIENT
Start: 2022-04-26 | End: 2022-04-28

## 2022-04-26 RX ORDER — CHOLECALCIFEROL (VITAMIN D3) 125 MCG
1 CAPSULE ORAL
Qty: 0 | Refills: 0 | DISCHARGE

## 2022-04-26 RX ORDER — BUDESONIDE AND FORMOTEROL FUMARATE DIHYDRATE 160; 4.5 UG/1; UG/1
2 AEROSOL RESPIRATORY (INHALATION)
Qty: 0 | Refills: 0 | DISCHARGE

## 2022-04-26 RX ORDER — INSULIN GLARGINE 100 [IU]/ML
20 INJECTION, SOLUTION SUBCUTANEOUS AT BEDTIME
Refills: 0 | Status: DISCONTINUED | OUTPATIENT
Start: 2022-04-26 | End: 2022-04-26

## 2022-04-26 RX ORDER — BACLOFEN 100 %
10 POWDER (GRAM) MISCELLANEOUS THREE TIMES A DAY
Refills: 0 | Status: DISCONTINUED | OUTPATIENT
Start: 2022-04-26 | End: 2022-04-28

## 2022-04-26 RX ADMIN — Medication 6: at 22:00

## 2022-04-26 RX ADMIN — Medication 5 UNIT(S): at 13:05

## 2022-04-26 RX ADMIN — Medication 12: at 12:40

## 2022-04-26 RX ADMIN — Medication 6 MILLIGRAM(S): at 12:07

## 2022-04-26 RX ADMIN — Medication 3 MILLILITER(S): at 17:44

## 2022-04-26 RX ADMIN — TAMSULOSIN HYDROCHLORIDE 0.8 MILLIGRAM(S): 0.4 CAPSULE ORAL at 21:42

## 2022-04-26 RX ADMIN — Medication 40 MILLIGRAM(S): at 06:54

## 2022-04-26 RX ADMIN — HEPARIN SODIUM 5000 UNIT(S): 5000 INJECTION INTRAVENOUS; SUBCUTANEOUS at 21:42

## 2022-04-26 RX ADMIN — Medication 0.5 MILLIGRAM(S): at 17:44

## 2022-04-26 RX ADMIN — Medication 81 MILLIGRAM(S): at 11:59

## 2022-04-26 RX ADMIN — Medication 6: at 08:48

## 2022-04-26 RX ADMIN — Medication 75 MILLIGRAM(S): at 11:58

## 2022-04-26 RX ADMIN — Medication 12: at 17:56

## 2022-04-26 RX ADMIN — HEPARIN SODIUM 5000 UNIT(S): 5000 INJECTION INTRAVENOUS; SUBCUTANEOUS at 06:52

## 2022-04-26 RX ADMIN — Medication 10 UNIT(S): at 17:57

## 2022-04-26 RX ADMIN — Medication 40 MILLIGRAM(S): at 17:58

## 2022-04-26 RX ADMIN — AMIODARONE HYDROCHLORIDE 400 MILLIGRAM(S): 400 TABLET ORAL at 11:58

## 2022-04-26 RX ADMIN — INSULIN GLARGINE 20 UNIT(S): 100 INJECTION, SOLUTION SUBCUTANEOUS at 06:51

## 2022-04-26 RX ADMIN — Medication 10 MILLIGRAM(S): at 21:42

## 2022-04-26 RX ADMIN — Medication 3 MILLILITER(S): at 06:50

## 2022-04-26 RX ADMIN — FAMOTIDINE 20 MILLIGRAM(S): 10 INJECTION INTRAVENOUS at 11:59

## 2022-04-26 RX ADMIN — BUDESONIDE AND FORMOTEROL FUMARATE DIHYDRATE 2 PUFF(S): 160; 4.5 AEROSOL RESPIRATORY (INHALATION) at 11:58

## 2022-04-26 RX ADMIN — HEPARIN SODIUM 5000 UNIT(S): 5000 INJECTION INTRAVENOUS; SUBCUTANEOUS at 17:59

## 2022-04-26 NOTE — H&P ADULT - NSHPPHYSICALEXAM_GEN_ALL_CORE
PHYSICAL EXAM:   GENERAL: Alert. Not confused. No acute distress. Not thin. Not cachectic. Not obese.  HEAD:  Atraumatic. Normocephalic.  EYES: EOMI. PERRLA. Normal conjunctiva/sclera.  ENT: Neck supple. No JVD. Moist oral mucosa. Not edentulous. No thrush.  LYMPH: Normal supraclavicular/cervical lymph nodes.   CARDIAC: Not tachy, Not rolan. Regular rhythm. Not irregularly irregular. S1. S2.   LUNG/CHEST: CTAB. BS equal bilaterally. No wheezes. No rales. No rhonchi.  ABDOMEN: Soft. No tenderness. No distension. No fluid wave. Normal bowel sounds.  BACK: No midline/vertebral tenderness. No flank tenderness.  VASCULAR: +2 b/l radial or ulnar pulses. Palpable DP pulses.  EXTREMITIES:  No clubbing. No cyanosis. No edema. Moving all 4.  NEUROLOGY: A&Ox3. Non-focal exam. Cranial nerves intact. Normal speech. Sensation intact.  PSYCH: Normal behavior. Normal affect.  SKIN: No jaundice. No erythema. No rash/lesion.  Vascular Access:     ICU Vital Signs Last 24 Hrs  T(C): 36.3 (26 Apr 2022 03:38), Max: 37.6 (25 Apr 2022 21:35)  T(F): 97.4 (26 Apr 2022 03:38), Max: 99.6 (25 Apr 2022 21:35)  HR: 85 (26 Apr 2022 03:38) (85 - 140)  BP: 122/74 (26 Apr 2022 03:38) (114/87 - 198/134)  BP(mean): 96 (26 Apr 2022 01:00) (94 - 153)  ABP: --  ABP(mean): --  RR: 20 (26 Apr 2022 03:38) (20 - 42)  SpO2: 99% (26 Apr 2022 03:38) (83% - 100%)      I&O's Summary    25 Apr 2022 07:01  -  26 Apr 2022 05:12  --------------------------------------------------------  IN: 0 mL / OUT: 850 mL / NET: -850 mL

## 2022-04-26 NOTE — CONSULT NOTE ADULT - ASSESSMENT
ASSESSMENT:    72 year old gentleman, lifelong non-smoker, followed by Dr. Chico Cabrales of our practice for obstructive sleep apnea (non-compliant with CPAP), asthma and sarcoidosis requiring intermittent month-long courses of steroids for "flares" (with likely cardiac involvement). He has a history of HTN, HLD, DM, non-ischemic cardiomyopathy now with an LVEF ~ 30% on ECHO 4/14/2022 s/p AICD implantation and CKD. The patient was admitted to [facility] on 4/13 shortly after arriving home from [facility] with shortness of breath, fatigue, weakness and chest pain. He was diuresed for pulmonary edema complicated by IRWIN. Subsequent IV fluid hydration was complicated by worsening heart failure requiring NIV for respiratory support. He continued to have APCs, PVCs and NSVT on telemetry and was started on amiodarone. Cardiac perfusion scan was negative for ischemia - fixed defects were felt to be related to an infiltrative process. CT scan -> scattered perilymphatic nodules increased in size from a prior study -> started on steroids/mepron by rheumatology (felt not to be a candidate for methotrexate due to CKD or infliximab or humira due to CHF). He was discharged on 4/23 feeling "well" on amiodarone, toprol XL and ASA - entresto and diuretics were held due to IRWIN - prednisone/mepron - toujeo/humalog (no oral hypoglycemics) - sodium bicarbonate - fluid restriction - plans for outpatient cardiac MRI or PET scan. The patient comes to the [place] ER yesterday with shortness of breath, cough productive of scant sputum and chest congestion and wheeze. No fevers, chills or sweats. No chest pain/pressure or palpitations. No lower extremity swelling, PND or orthopnea. No loss of the sense of taste or smell. He has been found to have COVID. His respiratory symptoms have improved following a brisk diuresis and he has now off BIPAP and on a nasal canula @ 6lpm.    acute hypoxic respiratory failure -> multifactorial  1) COVID infection without evidence of COVID pneumonia  2) underlying sarcoidosis perhaps with exacerbation  3) cardiac sarcoidosis with a non-ischemic cardiomyopathy - suspect pulmonary edema after discharge from [facility] off entresto and diuretics due to IRWIN    PLAN/RECOMMENDATIONS:    oxygen supplementation to keep saturation greater than 92%  off BIPAP -> on a 6lpm nasal canula -> tapered down to 4lpm  CT chest to better evaluate the lung parenchyma  albuterol/atrovent nebs q6h  pulmicort 0.5mg nebs q12h - give after duoneb - rinse mouth after use  holding remdesivir due to renal dysfunction  on decadron 6mg IVP daily - needs better glucose control on steroids  diligent DVT prophylaxis - SQ heparin  cautious diurese as tolerated by renal function and hemodynamics  cardiac meds: amiodarone/toprol XL/ASA/lasix - would restart entresto if renal function remains stable - would observe off amiodarone given its potential for lung toxicity until evaluation by Dr. Irvin Bell of cardiology  recent ECHO -LVEF 30% - no significant valvular heart disease  recent nuclear stress test -> negative for ischemia - fixed defects were felt to be related to an infiltrative process  GI prophylaxis - pepcid    Thank you for the courtesy of this referral. Plan of care discussed with the patient at bedside and with Dr. King Duval MD, [facility]  [phone]  Pulmonary Medicine       ASSESSMENT:    72 year old gentleman, lifelong non-smoker, followed by Dr. Chico Cabrales of our practice for obstructive sleep apnea (non-compliant with CPAP), asthma and sarcoidosis requiring intermittent month-long courses of steroids for "flares" (with likely cardiac involvement). He has a history of HTN, HLD, DM, non-ischemic cardiomyopathy now with an LVEF ~ 30% on ECHO 4/14/2022 s/p AICD implantation and CKD. The patient was admitted to [facility] on 4/13 shortly after arriving home from [facility] with shortness of breath, fatigue, weakness and chest pain. He was diuresed for pulmonary edema complicated by IRWIN. Subsequent IV fluid hydration was complicated by worsening heart failure requiring NIV for respiratory support. He continued to have APCs, PVCs and NSVT on telemetry and was started on amiodarone. Cardiac perfusion scan was negative for ischemia - fixed defects were felt to be related to an infiltrative process. CT scan -> scattered perilymphatic nodules increased in size from a prior study -> started on steroids/mepron by rheumatology (felt not to be a candidate for methotrexate due to CKD or infliximab or humira due to CHF). He was discharged on 4/23 feeling "well" on amiodarone, toprol XL and ASA - entresto and diuretics were held due to IRWIN - prednisone/mepron - toujeo/humalog (no oral hypoglycemics) - sodium bicarbonate - fluid restriction - plans for outpatient cardiac MRI or PET scan. The patient comes to the [place] ER yesterday with shortness of breath, cough productive of scant sputum and chest congestion and wheeze. No fevers, chills or sweats. No chest pain/pressure or palpitations. No lower extremity swelling, PND or orthopnea. No loss of the sense of taste or smell. He has been found to have COVID. His respiratory symptoms have improved following a brisk diuresis and he has now off BIPAP and on a nasal canula @ 6lpm.    acute hypoxic respiratory failure -> multifactorial  1) COVID infection without evidence of COVID pneumonia  2) underlying sarcoidosis perhaps with exacerbation  3) cardiac sarcoidosis with a non-ischemic cardiomyopathy - suspect pulmonary edema after discharge from [facility] off entresto and diuretics due to IRWIN  4) bronchospasm -> resolved - cardiac asthma versus asthma exacerbation    PLAN/RECOMMENDATIONS:    oxygen supplementation to keep saturation greater than 92%  off BIPAP -> on a 6lpm nasal canula -> tapered down to 4lpm  CT chest to better evaluate the lung parenchyma  albuterol/atrovent nebs q6h  pulmicort 0.5mg nebs q12h - give after duoneb - rinse mouth after use  holding remdesivir due to renal dysfunction  on decadron 6mg IVP daily - needs better glucose control on steroids  diligent DVT prophylaxis - SQ heparin  cautious diurese as tolerated by renal function and hemodynamics  cardiac meds: amiodarone/toprol XL/ASA/lasix - would restart entresto if renal function remains stable - would observe off amiodarone given its potential for lung toxicity until evaluation by Dr. Irvin Bell of cardiology  recent ECHO -LVEF 30% - no significant valvular heart disease  recent nuclear stress test -> negative for ischemia - fixed defects were felt to be related to an infiltrative process  GI prophylaxis - pepcid    Thank you for the courtesy of this referral. Plan of care discussed with the patient at bedside and with Dr. King Duval MD, [facility]  [phone]  Pulmonary Medicine       ASSESSMENT:    72 year old gentleman, lifelong non-smoker, followed by Dr. Chico Cabrales of our practice for obstructive sleep apnea (non-compliant with CPAP), asthma and sarcoidosis requiring intermittent month-long courses of steroids for "flares" (with likely cardiac involvement). He has a history of HTN, HLD, DM, non-ischemic cardiomyopathy now with an LVEF ~ 30% on ECHO 4/14/2022 s/p AICD implantation and CKD. The patient was admitted to [facility] on 4/13 shortly after arriving home from [facility] with shortness of breath, fatigue, weakness and chest pain. He was diuresed for pulmonary edema complicated by IRWIN. Subsequent IV fluid hydration was complicated by worsening heart failure requiring NIV for respiratory support. He continued to have APCs, PVCs and NSVT on telemetry and was started on amiodarone. Cardiac perfusion scan was negative for ischemia - fixed defects were felt to be related to an infiltrative process. CT scan -> scattered perilymphatic nodules increased in size from a prior study -> started on steroids/mepron by rheumatology (felt not to be a candidate for methotrexate due to CKD or infliximab or humira due to CHF). He was discharged on 4/23 feeling "well" on amiodarone, toprol XL and ASA - entresto and diuretics were held due to IRWIN - prednisone/mepron - toujeo/humalog (no oral hypoglycemics) - sodium bicarbonate - fluid restriction - plans for outpatient cardiac MRI or PET scan. The patient comes to the [place] ER yesterday with shortness of breath, cough productive of scant sputum and chest congestion and wheeze. No fevers, chills or sweats. No chest pain/pressure or palpitations. No lower extremity swelling, PND or orthopnea. No loss of the sense of taste or smell. He has been found to have COVID. His respiratory symptoms have improved following a brisk diuresis and he has now off BIPAP and on a nasal canula @ 6lpm.    acute hypoxic respiratory failure -> multifactorial  1) COVID infection without evidence of COVID pneumonia  2) underlying sarcoidosis without evidence of exacerbation  3) cardiac sarcoidosis with a non-ischemic cardiomyopathy - suspect pulmonary edema after discharge from [facility] off entresto and diuretics due to IRWIN  4) bronchospasm -> resolved - cardiac asthma versus asthma exacerbation    PLAN/RECOMMENDATIONS:    oxygen supplementation to keep saturation greater than 92%  off BIPAP -> on a 6lpm nasal canula -> tapered down to 4lpm  CT chest to better evaluate the lung parenchyma -> "to my eye" -> bilateral upper lobe perihilar hilar consolidation with scattered perilymphatic nodules without significant change from 2021- nonvisualization prior right middle lobe 1.6 cm nodule - calcified mediastinal lymph nodes - superimposed small right and trace left pleural effusion - mild scattered areas of groundglass opacities c/w pulmonary edema  albuterol/atrovent nebs q6h  pulmicort 0.5mg nebs q12h - give after duoneb - rinse mouth after use  holding remdesivir due to renal dysfunction  on decadron 6mg IVP daily - needs better glucose control on steroids (no indication for systemic steroids for sarcoidosis)  diligent DVT prophylaxis - SQ heparin  cautious diurese as tolerated by renal function and hemodynamics  cardiac meds: amiodarone/toprol XL/ASA/lasix - would restart entresto if renal function remains stable - would observe off amiodarone given its potential for lung toxicity until evaluation by Dr. Irvin Bell of cardiology  recent ECHO -LVEF 30% - no significant valvular heart disease  recent nuclear stress test -> negative for ischemia - fixed defects were felt to be related to an infiltrative process  GI prophylaxis - pepcid    Thank you for the courtesy of this referral. Plan of care discussed with the patient at bedside and with Dr. King Duval MD, [facility]  [phone]  Pulmonary Medicine       ASSESSMENT:    72 year old gentleman, lifelong non-smoker, followed by Dr. Chico Cabrales of our practice for obstructive sleep apnea (non-compliant with CPAP), asthma and sarcoidosis requiring intermittent month-long courses of steroids for "flares" (with likely cardiac involvement). He has a history of HTN, HLD, DM, non-ischemic cardiomyopathy now with an LVEF ~ 30% on ECHO 4/14/2022 s/p AICD implantation and CKD. The patient was admitted to [facility] on 4/13 shortly after arriving home from [facility] with shortness of breath, fatigue, weakness and chest pain. He was diuresed for pulmonary edema complicated by IRWIN. Subsequent IV fluid hydration was complicated by worsening heart failure requiring NIV for respiratory support. He continued to have APCs, PVCs and NSVT on telemetry and was started on amiodarone. Cardiac perfusion scan was negative for ischemia - fixed defects were felt to be related to an infiltrative process. CT scan -> scattered perilymphatic nodules increased in size from a prior study -> started on steroids/mepron by rheumatology (felt not to be a candidate for methotrexate due to CKD or infliximab or humira due to CHF). He was discharged on 4/23 feeling "well" on amiodarone, toprol XL and ASA - entresto and diuretics were held due to IRWIN - prednisone/mepron - toujeo/humalog (no oral hypoglycemics) - sodium bicarbonate - fluid restriction - plans for outpatient cardiac MRI or PET scan. The patient comes to the [place] ER yesterday with shortness of breath, cough productive of scant sputum and chest congestion and wheeze. No fevers, chills or sweats. No chest pain/pressure or palpitations. No lower extremity swelling, PND or orthopnea. No loss of the sense of taste or smell. He has been found to have COVID. His respiratory symptoms have improved following a brisk diuresis and he has now off BIPAP and on a nasal canula @ 6lpm.    acute hypoxic respiratory failure -> multifactorial  1) COVID infection without evidence of COVID pneumonia  2) underlying sarcoidosis without evidence of exacerbation  3) cardiac sarcoidosis with a non-ischemic cardiomyopathy - suspect pulmonary edema after discharge from [facility] off entresto and diuretics due to IRWIN  4) bronchospasm -> resolved - cardiac asthma versus asthma exacerbation    PLAN/RECOMMENDATIONS:    oxygen supplementation to keep saturation greater than 92%  off BIPAP -> on a 6lpm nasal canula -> tapered down to 4lpm  CT chest to better evaluate the lung parenchyma -> "to my eye" -> bilateral upper lobe perihilar hilar consolidation with scattered perilymphatic nodules without significant change from 2021- nonvisualization prior right middle lobe 1.6 cm nodule - calcified mediastinal lymph nodes - superimposed small right and trace left pleural effusion - mild scattered areas of groundglass opacities c/w pulmonary edema  albuterol/atrovent nebs q6h  pulmicort 0.5mg nebs q12h - give after duoneb - rinse mouth after use  baclofen for recurrent intractable hiccups  holding remdesivir due to renal dysfunction  on decadron 6mg IVP daily - needs better glucose control on steroids (no indication for systemic steroids for sarcoidosis)  diligent DVT prophylaxis - SQ heparin  cautious diurese as tolerated by renal function and hemodynamics  cardiac meds: amiodarone/toprol XL/ASA/lasix - would restart entresto if renal function remains stable - would observe off amiodarone given its potential for lung toxicity until evaluation by Dr. Irvin Bell of cardiology  recent ECHO -LVEF 30% - no significant valvular heart disease  recent nuclear stress test -> negative for ischemia - fixed defects were felt to be related to an infiltrative process  GI prophylaxis - pepcid    Thank you for the courtesy of this referral. Plan of care discussed with the patient at bedside and with Dr. King Duval MD, [facility]  [phone]  Pulmonary Medicine       ASSESSMENT:    72 year old gentleman, lifelong non-smoker, followed by Dr. Chico Cabrales of our practice for obstructive sleep apnea (non-compliant with CPAP), asthma and sarcoidosis requiring intermittent month-long courses of steroids for "flares" (with likely cardiac involvement). He has a history of HTN, HLD, DM, non-ischemic cardiomyopathy now with an LVEF ~ 30% on ECHO 4/14/2022 s/p AICD implantation and CKD. The patient was admitted to [facility] on 4/13 shortly after arriving home from [facility] with shortness of breath, fatigue, weakness and chest pain. He was diuresed for pulmonary edema complicated by IRWIN. Subsequent IV fluid hydration was complicated by worsening heart failure requiring NIV for respiratory support. He continued to have APCs, PVCs and NSVT on telemetry and was started on amiodarone. Cardiac perfusion scan was negative for ischemia - fixed defects were felt to be related to an infiltrative process. CT scan -> scattered perilymphatic nodules increased in size from a prior study -> started on steroids/mepron by rheumatology (felt not to be a candidate for methotrexate due to CKD or infliximab or humira due to CHF). He was discharged on 4/23 feeling "well" on amiodarone, toprol XL and ASA - entresto and diuretics were held due to IRWIN - prednisone/mepron - toujeo/humalog (no oral hypoglycemics) - sodium bicarbonate - fluid restriction - plans for outpatient cardiac MRI or PET scan. The patient comes to the [place] ER yesterday with shortness of breath with acute hypoxic respiratory failure requiring NIV therapy. He has a cough productive of scant sputum. He has chest congestion and wheeze. No fevers, chills or sweats. No chest pain/pressure or palpitations. No lower extremity swelling, PND or orthopnea. No loss of the sense of taste or smell. He has been found to have COVID-19 infection. His respiratory symptoms have improved following a brisk diuresis and he has now off BIPAP and on a nasal canula @ 6lpm    acute hypoxic respiratory failure -> multifactorial  1) COVID infection without evidence of COVID pneumonia  2) underlying sarcoidosis without evidence of exacerbation  3) cardiac sarcoidosis with a non-ischemic cardiomyopathy - suspect pulmonary edema after discharge from [facility] off entresto and diuretics due to IRWIN  4) bronchospasm -> resolved - cardiac asthma versus asthma exacerbation    PLAN/RECOMMENDATIONS:    oxygen supplementation to keep saturation greater than 92%  off BIPAP -> on a 6lpm nasal canula -> tapered down to 4lpm  CT chest to better evaluate the lung parenchyma -> "to my eye" -> bilateral perihilar architectural lung distortion with volume loss with a few patchy peripheral bilateral lower lobe opacities are without change - calcified mediastinal lymph nodes - superimposed small right and trace left pleural effusion - mild scattered areas of groundglass opacities c/w pulmonary edema  albuterol/atrovent nebs q6h  pulmicort 0.5mg nebs q12h - give after duoneb - rinse mouth after use  baclofen for recurrent intractable hiccups  holding remdesivir due to renal dysfunction  on decadron 6mg IVP daily - needs better glucose control on steroids (no indication for systemic steroids for sarcoidosis)  diligent DVT prophylaxis - SQ heparin  cautious diurese as tolerated by renal function and hemodynamics  cardiac meds: amiodarone/toprol XL/ASA/lasix - would restart entresto if renal function remains stable - would observe off amiodarone given its potential for lung toxicity until evaluation by Dr. Irvin Bell of cardiology  recent ECHO -LVEF 30% - no significant valvular heart disease  recent nuclear stress test -> negative for ischemia - fixed defects were felt to be related to an infiltrative process  GI prophylaxis - pepcid    Thank you for the courtesy of this referral. Plan of care discussed with the patient at bedside and with Dr. King Duval MD, [facility]  [phone]  Pulmonary Medicine       ASSESSMENT:    72 year old gentleman, lifelong non-smoker, followed by Dr. Chico Cabrales of our practice for obstructive sleep apnea (non-compliant with CPAP), asthma and sarcoidosis requiring intermittent month-long courses of steroids for "flares" (with likely cardiac involvement). He has a history of HTN, HLD, DM, non-ischemic cardiomyopathy now with an LVEF ~ 30% on ECHO 4/14/2022 s/p AICD implantation and CKD. The patient was admitted to [facility] on 4/13 shortly after arriving home from [facility] with shortness of breath, fatigue, weakness and chest pain. He was diuresed for pulmonary edema complicated by IRWIN. Subsequent IV fluid hydration was complicated by worsening heart failure requiring NIV for respiratory support. He continued to have APCs, PVCs and NSVT on telemetry and was started on amiodarone. Cardiac perfusion scan was negative for ischemia - fixed defects were felt to be related to an infiltrative process. CT scan -> scattered perilymphatic nodules increased in size from a prior study -> started on steroids/mepron by rheumatology (felt not to be a candidate for methotrexate due to CKD or infliximab or humira due to CHF). He was discharged on 4/23 feeling "well" on amiodarone, toprol XL and ASA - entresto and diuretics were held due to IRWIN - prednisone/mepron - toujeo/humalog (no oral hypoglycemics) - sodium bicarbonate - fluid restriction - plans for outpatient cardiac MRI or PET scan. The patient comes to the [place] ER yesterday with shortness of breath with acute hypoxic respiratory failure requiring NIV therapy. He has a cough productive of scant sputum. He has chest congestion and wheeze. No fevers, chills or sweats. No chest pain/pressure or palpitations. No lower extremity swelling, PND or orthopnea. No loss of the sense of taste or smell. He has been found to have COVID-19 infection. His respiratory symptoms have improved following a brisk diuresis and he has now off BIPAP and on a nasal canula @ 6lpm    acute hypoxic respiratory failure -> multifactorial  1) COVID infection without evidence of COVID pneumonia  2) underlying sarcoidosis without evidence of exacerbation  3) cardiac sarcoidosis with a non-ischemic cardiomyopathy - suspect pulmonary edema after discharge from [facility] off entresto and diuretics due to IRWIN  4) moderate persistent asthma with bronchospasm -> resolved - cardiac asthma versus asthma exacerbation    PLAN/RECOMMENDATIONS:    oxygen supplementation to keep saturation greater than 92%  off BIPAP -> on a 6lpm nasal canula -> tapered down to 4lpm  CT chest to better evaluate the lung parenchyma -> "to my eye" -> bilateral perihilar architectural lung distortion with volume loss with a few patchy peripheral bilateral lower lobe opacities are without change - calcified mediastinal lymph nodes - superimposed small right and trace left pleural effusion - mild scattered areas of groundglass opacities c/w pulmonary edema  albuterol/atrovent nebs q6h  pulmicort 0.5mg nebs q12h - give after duoneb - rinse mouth after use  baclofen for recurrent intractable hiccups  holding remdesivir due to renal dysfunction  on decadron 6mg IVP daily - needs better glucose control on steroids (no indication for systemic steroids for sarcoidosis)  diligent DVT prophylaxis - SQ heparin  cautious diurese as tolerated by renal function and hemodynamics  cardiac meds: amiodarone/toprol XL/ASA/lasix - would restart entresto if renal function remains stable - would observe off amiodarone given its potential for lung toxicity until evaluation by Dr. Irivn Bell of cardiology  recent ECHO -LVEF 30% - no significant valvular heart disease  recent nuclear stress test -> negative for ischemia - fixed defects were felt to be related to an infiltrative process  GI prophylaxis - pepcid    Thank you for the courtesy of this referral. Plan of care discussed with the patient at bedside and with Dr. King Duval MD, [facility]  [phone]  Pulmonary Medicine

## 2022-04-26 NOTE — H&P ADULT - ASSESSMENT
72M c hx HTN, pulmonary sarcoidosis, asthma, CHF (?reported EF 45%) s/p AICD, DM2, ?CKD, "extra heart beat" on amiodarone, intermittent intractable hiccups, recent hospitalization at [facility] (reportedly hospitalized for 12 days, d/c'd 4 days ago) for ?CP and CHF exacerbation, pw acute hypoxic respiratory failure, sepsis, IRWIN, 2/2 covid and poss CHF exacerbation, c/b steroid induced hyperglycemia

## 2022-04-26 NOTE — H&P ADULT - PROBLEM SELECTOR PLAN 5
- pt states his stress test was reportedly normal at [facility]  - trend CE  - likely ischemia 2/2 hypoxia - pt states his stress test was reportedly normal at [facility]  - trend CE  - likely ischemia 2/2 hypoxia  - [facility]

## 2022-04-26 NOTE — CONSULT NOTE ADULT - SUBJECTIVE AND OBJECTIVE BOX
[facility]  [phone]  NEPHROLOGY      INITIAL CONSULT NOTE  --------------------------------------------------------------------------------  HPI:      72M c hx HTN, pulmonary sarcoidosis, asthma, CHF (?reported EF 45%) s/p AICD, DM2, ?CKD, "extra heart beat" on amiodarone, intermittent intractable hiccups, recent hospitalization at [facility] (reportedly hospitalized for 12 days, d/c'd 4 days ago) for ?CP and CHF exacerbation, pw SOB and CP.    At discharge, pt states he was breathing well and feeling good. pt states his course at [facility] was a nuc pharm stress test which was reportedly normal. Pt reportedly diuresed with lasix. Pt also started on prednisone 30 for ?  sarcoidosis. Pt is not sure why he was started on amiodarone, but states he was told he had an "extra heart beat". Pt also says his Cr was 3.5 while at [facility] as per pt . Pt states he did not have covid while at [facility].  morning of admission he awoke with mild SOB, worse with lying flat, improved with standing up. After eating dinner and one hour prior to arrival to ED, pt reports acute worsening of his SOB, chest tightness, coughing. Pt reports good compliance with his home meds.   pt admitted noticed with fluid overloaded started on diuretics as well. states has long standing hx of DM with no frothy urine but difficulty emptying bladder at times. states has been aware of his abn creatinine for many years as well     PAST HISTORY  --------------------------------------------------------------------------------  PAST MEDICAL & SURGICAL HISTORY:  Asthma    Sarcoidosis    Ventricular dysfunction of heart    Diabetes mellitus type 2 in nonobese    BPH (benign prostatic hyperplasia)    AICD (automatic cardioverter/defibrillator) present      FAMILY HISTORY:  Family history of acute congestive heart failure (Father)  Father    Family history of chronic renal failure syndrome (Father)  Father    Family history of heart disease (Mother)  Mother      PAST SOCIAL HISTORY:    ALLERGIES & MEDICATIONS  --------------------------------------------------------------------------------  Allergies    penicillin (Faint)    Intolerances      Standing Inpatient Medications  albuterol/ipratropium for Nebulization 3 milliLiter(s) Nebulizer every 6 hours  aMIOdarone    Tablet 400 milliGRAM(s) Oral daily  aspirin enteric coated 81 milliGRAM(s) Oral daily  baclofen 10 milliGRAM(s) Oral three times a day  buDESOnide    Inhalation Suspension 0.5 milliGRAM(s) Inhalation every 12 hours  chlorhexidine 2% Cloths 1 Application(s) Topical <User Schedule>  dexAMETHasone     Tablet 6 milliGRAM(s) Oral daily  dextrose 5%. 1000 milliLiter(s) IV Continuous <Continuous>  dextrose 5%. 1000 milliLiter(s) IV Continuous <Continuous>  dextrose 50% Injectable 25 Gram(s) IV Push once  dextrose 50% Injectable 12.5 Gram(s) IV Push once  dextrose 50% Injectable 25 Gram(s) IV Push once  famotidine    Tablet 20 milliGRAM(s) Oral daily  furosemide   Injectable 40 milliGRAM(s) IV Push two times a day  glucagon  Injectable 1 milliGRAM(s) IntraMuscular once  heparin   Injectable 5000 Unit(s) SubCutaneous three times a day  insulin lispro (ADMELOG) corrective regimen sliding scale   SubCutaneous three times a day before meals  insulin lispro (ADMELOG) corrective regimen sliding scale   SubCutaneous at bedtime  insulin lispro Injectable (ADMELOG) 10 Unit(s) SubCutaneous three times a day before meals  metoprolol succinate ER 75 milliGRAM(s) Oral daily  tamsulosin 0.8 milliGRAM(s) Oral at bedtime    PRN Inpatient Medications  dextrose Oral Gel 15 Gram(s) Oral once PRN      REVIEW OF SYSTEMS  --------------------------------------------------------------------------------  Gen: No  fevers/chills   Skin: No rashes  Head/Eyes/Ears/Mouth: No headache; Normal hearing;  No sinus pain/discomfort, sore throat  Respiratory:  + improving  dyspnea and cough, no  wheezing, hemoptysis  CV: No chest pain, orthopnea  GI: No abdominal pain, diarrhea, nausea, vomiting, melena  : No dysuria, decrease urination or hesitancy urinating  hematuria, nocturia  MSK: No joint pain/swelling; no back pain  Neuro: No dizziness/lightheadedness  also with +  edema     VITALS/PHYSICAL EXAM  --------------------------------------------------------------------------------  T(C): 36.7 (04-26-22 @ 11:56), Max: 37.6 (04-25-22 @ 21:35)  HR: 80 (04-26-22 @ 14:55) (74 - 140)  BP: 122/74 (04-26-22 @ 11:56) (114/87 - 198/134)  RR: 18 (04-26-22 @ 11:56) (18 - 42)  SpO2: 98% (04-26-22 @ 14:55) (83% - 100%)  Wt(kg): --  Height (cm): 182.9 (04-26-22 @ 03:38)  Weight (kg): 81.4 (04-26-22 @ 03:38)  BMI (kg/m2): 24.3 (04-26-22 @ 03:38)  BSA (m2): 2.03 (04-26-22 @ 03:38)      04-25-22 @ 07:01  -  04-26-22 @ 07:00  --------------------------------------------------------  IN: 0 mL / OUT: 1300 mL / NET: -1300 mL    04-26-22 @ 07:01  -  04-26-22 @ 20:17  --------------------------------------------------------  IN: 1030 mL / OUT: 400 mL / NET: 630 mL      Physical Exam:  	Gen: Non toxic comfortable appearing   	no jvd  	Pulm: decrease bs  no rales or ronchi or wheezing  	CV: RRR, S1S2; no rub  	Back: No CVA tenderness  	Abd: +BS, soft, nontender/nondistended + hernia midline   	: No suprapubic tenderness  ? bladder distention   	UE: Warm, no cyanosis  no clubbing,  no edema; no asterixis  	LE: Warm, no cyanosis  no clubbing, 1+  edema  	Neuro: alert and oriented. speech coherent   	Psych: Normal affect and mood  	Skin: Warm, no decrease skin turgor     LABS/STUDIES  --------------------------------------------------------------------------------              11.1   11.23 >-----------<  250      [04-26-22 @ 06:29]              32.7     131  |  92  |  68  ----------------------------<  492      [04-26-22 @ 13:12]  4.8   |  21  |  3.03        Ca     10.8     [04-26-22 @ 13:12]      Mg     2.0     [04-26-22 @ 06:28]      Phos  3.7     [04-26-22 @ 06:28]    TPro  7.2  /  Alb  4.1  /  TBili  0.5  /  DBili  x   /  AST  19  /  ALT  78  /  AlkPhos  109  [04-26-22 @ 06:28]    PT/INR: PT 12.3 , INR 1.06       [04-26-22 @ 06:29]  PTT: 23.9       [04-26-22 @ 06:29]    CK 32      [04-26-22 @ 06:28]        [04-26-22 @ 06:28]    Creatinine Trend:  SCr 3.03 [04-26 @ 13:12]  SCr 2.99 [04-26 @ 06:28]  SCr 2.97 [04-26 @ 00:16]  SCr 3.06 [04-25 @ 21:56]    Urinalysis - [04-25-22 @ 22:37]      Color Light Yellow / Appearance Clear / SG 1.018 / pH 6.5      Gluc 1000 mg/dL / Ketone Negative  / Bili Negative / Urobili Negative       Blood Negative / Protein Negative / Leuk Est Negative / Nitrite Negative      RBC  / WBC  / Hyaline  / Gran  / Sq Epi  / Non Sq Epi  / Bacteria       Ferritin 237      [04-26-22 @ 08:53]  TSH 0.62      [04-26-22 @ 08:53]      < from: CT Chest No Cont (04.26.22 @ 12:51) >    ACC: [id number] EXAM:  CT CHEST                          PROCEDURE DATE:  04/26/2022          INTERPRETATION:  HISTORY: Admitting Dxs: I50.9 DIFFICULTY BREATHING    EXAMINATION: CT CHEST was performed without IV contrast.    COMPARISON: 2/3/2021.    FINDINGS:    AIRWAYS, LUNGS, PLEURA: Bilateral perihilar architectural lung distortion   with volume loss and few patchy peripheral bilateral lower lobe opacities   were present on 2/3/2021. Central left upper lobe consolidation and right   apical ground-glass opacities decreased compared to 2/3/2021.    Small bilateral pleural effusions, new on the right and unchanged on the   left.    MEDIASTINUM: Dilated left ventricle. No pericardial effusion. Thoracic   aorta normal caliber.  Calcified mediastinal nodes.    IMAGED ABDOMEN: Splenomegaly; 16.5 cm.    SOFT TISSUES: Unremarkable.    BONES: Unremarkable.      IMPRESSION:.    New small right pleural effusion and unchanged small left pleural   effusion.    Findings of thoracic sarcoidosis with interval improvement of left upper   lobe consolidation and right apical ground-glass opacities compared to   2/3/2021.    --- End of Report ---    < end of copied text >

## 2022-04-26 NOTE — PROVIDER CONTACT NOTE (CRITICAL VALUE NOTIFICATION) - BACKGROUND
71 y/o M with PMH of HTN, CHF s/p AICD, IDDM, CKD, sarcoidosis presenting with SOB. Patient states felt slightly short of breath this
71 y/o M with PMH of HTN, CHF s/p AICD, IDDM, CKD, sarcoidosis presenting with SOB. Patient states felt slightly short of breath this AM

## 2022-04-26 NOTE — PROVIDER CONTACT NOTE (CRITICAL VALUE NOTIFICATION) - ACTION/TREATMENT ORDERED:
PASHA frederick, will monitor next FS pre-dinner. Insulin doses adjusted. 5 units premeal given before lunch as well as increased sliding scale.
PASHA Chen aware, give insulin and recheck FS.

## 2022-04-26 NOTE — H&P ADULT - NSHPSOCIALHISTORY_GEN_ALL_CORE
Social History:    Marital Status: (  ) , (  ) Single, (  ) , (  ) , ( x )   # of Children: 0  Lives with: (  ) alone, (  ) children, (  ) spouse, (  ) parents, (  ) siblings, (  ) friends, ( x ) other: cousin  Occupation:     Substance Use/Illicit Drugs: (  ) never used vs other:   Tobacco Usage: ( x ) never smoked, (  ) former smoker, (  ) current smoker and Total Pack-Years:   Last Alcohol Usage/Frequency/Amount/Withdrawal/Hx of Abuse:  2 weeks ago  Foreign travel:   Animal exposure:

## 2022-04-26 NOTE — CONSULT NOTE ADULT - SUBJECTIVE AND OBJECTIVE BOX
[facility] - [facility] - CONSULT NOTE    HPI: 72 year old obese gentleman, lifelong non-smoker, followed by Dr. Chico Cabrales of our practice for obstructive sleep apnea (non-compliant with CPAP), asthma and sarcoidosis requiring intermittent month-long courses of steroids for "flares" (with (?) cardiac involvement). She has a history of HTN, HLD, DM, non-ischemic cardiomyopathy now with an LVEF ~ 30% on ECHO 2022 s/p AICD implantation and CKD. The patient was admitted to [facility] on  shortly after arriving home from [facility] with shortness of breath and chest pain. The patient was diuresed for pulmonary edema complicated by IRWIN. IV fluid hydration was complicated by worsening heart failure requiring NIV for respiratory support. She continued to have APCs, PVCs and NSVT on telemetry. Cardiac perfusion scan was negative for ischemia - fixed defects felt to be related to an infiltrative process. CT scan -> scattered perilymphatic nodules increased in size from a prior study -> started on steroids (felt not to be a candidate for methotrexate due to CKD or infliximab or humiral due to CHF). She was discharged on  feeling "well" on amiodarone, toprol XL and ASA - entresto held due to IRWIN - prednisone/mepron - toujeo/humalog (no oral hypoglycemics) - sodium bicarbonate - fluid restriction - plans of outpatient cardiac MRI or PET scan. The patient comes to the [place] ER yesterday with worsening shortness of breath associated     PMHX:  Asthma  Sarcoidosis with cardiac involvement  Obstructive sleep apnea  Pneumonia  Non-ischemic cardiomyopathy  HTN (hypertension)  HLD (hyperlipidemia)  DM (diabetes mellitus)  GERD (gastroesophageal reflux disease)  BPH (benign prostatic hyperplasia)  Deviated septum    PSHX:  AICD (automatic cardioverter/defibrillator) implantation  Ankle surgery  Knee surgery  Nasal septum surgery  Tonsillectomy and adenoidectomy    FAMILY HISTORY:  father - congestive heart failure - DM - CAD - CHF - CKD  mother - CAD    SOCIAL HISTORY:  lifelong non-smoker    Pulmonary Medications:   albuterol/ipratropium for Nebulization 3 milliLiter(s) Nebulizer every 6 hours  budesonide 160 MICROgram(s)/formoterol 4.5 MICROgram(s) Inhaler 2 Puff(s) Inhalation two times a day      Antimicrobials:      Cardiology:  aMIOdarone    Tablet 400 milliGRAM(s) Oral daily  furosemide   Injectable 40 milliGRAM(s) IV Push two times a day  metoprolol succinate ER 75 milliGRAM(s) Oral daily  tamsulosin 0.8 milliGRAM(s) Oral at bedtime      Other:  aspirin enteric coated 81 milliGRAM(s) Oral daily  dexAMETHasone     Tablet 6 milliGRAM(s) Oral daily  dextrose 5%. 1000 milliLiter(s) IV Continuous <Continuous>  dextrose 5%. 1000 milliLiter(s) IV Continuous <Continuous>  dextrose 50% Injectable 25 Gram(s) IV Push once  dextrose 50% Injectable 12.5 Gram(s) IV Push once  dextrose 50% Injectable 25 Gram(s) IV Push once  famotidine    Tablet 20 milliGRAM(s) Oral daily  glucagon  Injectable 1 milliGRAM(s) IntraMuscular once  heparin   Injectable 5000 Unit(s) SubCutaneous three times a day  insulin glargine Injectable (LANTUS) 20 Unit(s) SubCutaneous at bedtime  insulin lispro (ADMELOG) corrective regimen sliding scale   SubCutaneous Before meals and at bedtime  insulin lispro Injectable (ADMELOG) 5 Unit(s) SubCutaneous three times a day before meals      Prn:  MEDICATIONS  (PRN):  dextrose Oral Gel 15 Gram(s) Oral once PRN Blood Glucose LESS THAN 70 milliGRAM(s)/deciliter      Allergies    penicillin (Faint)    Intolerances        HOME MEDICATIONS: see  H & P    REVIEW OF SYSTEMS:  Constitutional: As per HPI  HEENT: Within normal limits  CV: As per HPI  Resp: As per HPI  GI: Within normal limits   : Within normal limits  Musculoskeletal: Within normal limits  Skin: Within normal limits  Neurological: Within normal limits  Psychiatric: Within normal limits  Endocrine: Within normal limits  Hematologic/Lymphatic: Within normal limits  Allergic/Immunologic: Within normal limits    [x] All other systems negative    OBJECTIVE:    I&O's Detail    2022 07:  -  2022 07:00  --------------------------------------------------------  IN:  Total IN: 0 mL    OUT:    Voided (mL): 550 mL    Voided (mL): 750 mL  Total OUT: 1300 mL    Total NET: -1300 mL      2022 07:01  -  2022 11:33  --------------------------------------------------------  IN:  Total IN: 0 mL    OUT:    Voided (mL): 400 mL  Total OUT: 400 mL    Total NET: -400 mL        Daily Height in cm: 182.88 (2022 03:38)    Daily Weight in k.2 (2022 09:20)  POCT Blood Glucose.: 536 mg/dL (2022 08:16)  POCT Blood Glucose.: 570 mg/dL (2022 08:14)  POCT Blood Glucose.: 549 mg/dL (2022 06:41)  POCT Blood Glucose.: 473 mg/dL (2022 06:39)  POCT Blood Glucose.: 407 mg/dL (2022 23:35)      PHYSICAL EXAM:  ICU Vital Signs Last 24 Hrs  T(C): 36.3 (2022 03:38), Max: 37.6 (2022 21:35)  T(F): 97.4 (2022 03:38), Max: 99.6 (2022 21:35)  HR: 78 (2022 10:01) (78 - 140)  BP: 122/74 (2022 03:38) (114/87 - 198/134)  BP(mean): 96 (2022 01:00) (94 - 153)  ABP: --  ABP(mean): --  RR: 20 (2022 03:38) (20 - 42)  SpO2: 97% (2022 10:01) (83% - 100%)    General: Awake. Alert. Cooperative. No distress. Appears stated age 	  HEENT:   Atraumatic. Normocephalic. Anicteric. Normal oral mucosa. PERRL. EOMI.  Neck: Supple. Trachea midline. Thyroid without enlargement/tenderness/nodules. No carotid bruit. No JVD.	  Cardiovascular: Regular rate and rhythm. S1 S2 normal. No murmurs, rubs or gallops.  Respiratory: Respirations unlabored. Clear to auscultation and percussion bilaterally. No curvature.  Abdomen: Soft. Non-tender. Non-distended. No organomegaly. No masses. Normal bowel sounds.  Extremities: Warm to touch. No clubbing or cyanosis. No pedal edema.  Pulses: 2+ peripheral pulses all extremities.	  Skin: Normal skin color. No rashes or lesions. No ecchymoses. No cyanosis. Warm to touch.  Lymph Nodes: Cervical, supraclavicular and axillary nodes normal  Neurological: Motor and sensory examination equal and normal. A and O x 3  Psychiatry: Appropriate mood and affect.      LABS:                          11.1   11.23 )-----------( 250      ( 2022 06:29 )             32.7     CBC    WBC  11.23 <==, 15.90 <==    Hemoglobin  11.1 <<==, 13.0 <<==    Hematocrit  32.7 <==, 38.1 <==    Platelets  250 <==, 370 <==      131<L>  |  95<L>  |  67<H>  ----------------------------<  498<HH>    -  5.2   |  19<L>  |  2.99<H>    LYTES    sodium  131 <==, 132 <==, 132 <==    potassium   5.2 <==, 4.9 <==, 4.6 <==    chloride  95 <==, 94 <==, 92 <==    carbon dioxide  19 <==, 21 <==, 21 <==    =============================================================================================  RENAL FUNCTION:    Creatinine:   2.99  <<==, 2.97  <<==, 3.06  <<==    BUN:   67 <==, 61 <==, 60 <==    ============================================================================================    calcium   10.3 <==, 10.8 <==, 11.3 <==    phos   3.7 <==, 3.6 <==    mag   2.0 <==, 2.0 <==    ============================================================================================  LFTs    AST:   19 <== , 27 <== , 29 <==     ALT:  78  <== , 91  <== , 97  <==     AP:  109  <=, 123  <=, 141  <=    Bili:  0.5  <=, 0.4  <=, 0.4  <=    PT/INR - ( 2022 06:29 )   PT: 12.3 sec;   INR: 1.06 ratio         PTT - ( 2022 06:29 )  PTT:23.9 sec    Venous Blood Gas:   @ 00:16  7.41/39/47/25/82.4  VBG Lactate: 2.1    Venous Blood Gas:   @ 21:56  7.34/49/22/26/30.8  VBG Lactate: 2.9      Procalcitonin, Serum: 0.10 ng/mL ( @ 21:56)    Serum Pro-Brain Natriuretic Peptide: 37262 pg/mL ( @ 06:28)  Serum Pro-Brain Natriuretic Peptide: 7795 pg/mL ( @ 21:56)      CARDIAC MARKERS ( 2022 06:28 )  CPK 32 U/L /CKMB x     /CKMB Units x        troponin 76 ng/L  CARDIAC MARKERS ( 2022 21:56 )  CPK 62 U/L /CKMB x     /CKMB Units 5.8 ng/mL    troponin 56 ng/L        MICROBIOLOGY:   Urinalysis Basic - ( 2022 22:37 )    Color: Light Yellow / Appearance: Clear / S.018 / pH: x  Gluc: x / Ketone: Negative  / Bili: Negative / Urobili: Negative   Blood: x / Protein: Negative / Nitrite: Negative   Leuk Esterase: Negative / RBC: x / WBC x   Sq Epi: x / Non Sq Epi: x / Bacteria: x        RADIOLOGY:  [x ] Chest radiographs reviewed and interpreted by me       [facility] - [facility] - CONSULT NOTE    HPI: 72 year old gentleman, lifelong non-smoker, followed by Dr. Chico Cabrales of our practice for obstructive sleep apnea (non-compliant with CPAP), asthma and sarcoidosis requiring intermittent month-long courses of steroids for "flares" (with likely cardiac involvement). He has a history of HTN, HLD, DM, non-ischemic cardiomyopathy now with an LVEF ~ 30% on ECHO 2022 s/p AICD implantation and CKD. The patient was admitted to [facility] on  shortly after arriving home from [facility] with shortness of breath, fatigue, weakness and chest pain. He was diuresed for pulmonary edema complicated by IRWIN. Subsequent IV fluid hydration was complicated by worsening heart failure requiring NIV for respiratory support. He continued to have APCs, PVCs and NSVT on telemetry and was started on amiodarone. Cardiac perfusion scan was negative for ischemia - fixed defects were felt to be related to an infiltrative process. CT scan -> scattered perilymphatic nodules increased in size from a prior study -> started on steroids/mepron by rheumatology (felt not to be a candidate for methotrexate due to CKD or infliximab or humira due to CHF). He was discharged on  feeling "well" on amiodarone, toprol XL and ASA - entresto and diuretics were held due to IRWIN - prednisone/mepron - toujeo/humalog (no oral hypoglycemics) - sodium bicarbonate - fluid restriction - plans for outpatient cardiac MRI or PET scan. The patient comes to the [place] ER yesterday with shortness of breath, cough productive of scant sputum and chest congestion and wheeze. No fevers, chills or sweats. No chest pain/pressure or palpitations. No lower extremity swelling, PND or orthopnea. No loss of the sense of taste or smell. He has been found to have COVID. His respiratory symptoms have improved following a brisk diuresis and he has now off BIPAP and on a nasal canula @ 6lpm. Chest radiographs are abnormal. Asked to evaluate.    PMHX:  Asthma  Sarcoidosis with cardiac involvement  Obstructive sleep apnea  Pneumonia  Non-ischemic cardiomyopathy  HTN (hypertension)  HLD (hyperlipidemia)  DM (diabetes mellitus)  GERD (gastroesophageal reflux disease)  BPH (benign prostatic hyperplasia)  Deviated septum    PSHX:  AICD (automatic cardioverter/defibrillator) implantation  Ankle surgery  Knee surgery  Nasal septum surgery  Tonsillectomy and adenoidectomy    FAMILY HISTORY:  father - congestive heart failure - DM - CAD - CHF - CKD  mother - CAD    SOCIAL HISTORY:  lifelong non-smoker; works as an ;  but     Pulmonary Medications:   albuterol/ipratropium for Nebulization 3 milliLiter(s) Nebulizer every 6 hours  budesonide 160 MICROgram(s)/formoterol 4.5 MICROgram(s) Inhaler 2 Puff(s) Inhalation two times a day      Antimicrobials:      Cardiology:  aMIOdarone    Tablet 400 milliGRAM(s) Oral daily  furosemide   Injectable 40 milliGRAM(s) IV Push two times a day  metoprolol succinate ER 75 milliGRAM(s) Oral daily  tamsulosin 0.8 milliGRAM(s) Oral at bedtime      Other:  aspirin enteric coated 81 milliGRAM(s) Oral daily  dexAMETHasone     Tablet 6 milliGRAM(s) Oral daily  dextrose 5%. 1000 milliLiter(s) IV Continuous <Continuous>  dextrose 5%. 1000 milliLiter(s) IV Continuous <Continuous>  dextrose 50% Injectable 25 Gram(s) IV Push once  dextrose 50% Injectable 12.5 Gram(s) IV Push once  dextrose 50% Injectable 25 Gram(s) IV Push once  famotidine    Tablet 20 milliGRAM(s) Oral daily  glucagon  Injectable 1 milliGRAM(s) IntraMuscular once  heparin   Injectable 5000 Unit(s) SubCutaneous three times a day  insulin glargine Injectable (LANTUS) 20 Unit(s) SubCutaneous at bedtime  insulin lispro (ADMELOG) corrective regimen sliding scale   SubCutaneous Before meals and at bedtime  insulin lispro Injectable (ADMELOG) 5 Unit(s) SubCutaneous three times a day before meals    Prn:  MEDICATIONS  (PRN):  dextrose Oral Gel 15 Gram(s) Oral once PRN Blood Glucose LESS THAN 70 milliGRAM(s)/deciliter    Allergies    penicillin (Faint)    HOME MEDICATIONS: see  H & P    REVIEW OF SYSTEMS:  Constitutional: As per HPI  HEENT: Within normal limits  CV: As per HPI  Resp: As per HPI  GI: Within normal limits   : Within normal limits  Musculoskeletal: Within normal limits  Skin: Within normal limits  Neurological: Within normal limits  Psychiatric: Within normal limits  Endocrine: Within normal limits  Hematologic/Lymphatic: Within normal limits  Allergic/Immunologic: Within normal limits    [x] All other systems negative    OBJECTIVE:    Daily Height in cm: 182.88 (2022 03:38)    Daily Weight in k.2 (2022 09:20)    POCT Blood Glucose.: 536 mg/dL (2022 08:16)  POCT Blood Glucose.: 570 mg/dL (2022 08:14)  POCT Blood Glucose.: 549 mg/dL (2022 06:41)  POCT Blood Glucose.: 473 mg/dL (2022 06:39)  POCT Blood Glucose.: 407 mg/dL (2022 23:35)      PHYSICAL EXAM:  ICU Vital Signs Last 24 Hrs  T(C): 36.3 (2022 03:38), Max: 37.6 (2022 21:35)  T(F): 97.4 (2022 03:38), Max: 99.6 (2022 21:35)  HR: 78 (2022 10:01) (78 - 140)  BP: 122/74 (2022 03:38) (114/87 - 198/134)  BP(mean): 96 (2022 01:00) (94 - 153)  ABP: --  ABP(mean): --  RR: 20 (2022 03:38) (20 - 42)  SpO2: 97% (2022 10:01) (83% on room air - 100% 6lpm nasal canula)    General: Awake. Alert. Cooperative. No distress. Appears stated age 	  HEENT:  Atraumatic. Normocephalic. Anicteric. Normal oral mucosa. PERRL. EOMI.  Neck: Supple. Trachea midline. Thyroid without enlargement/tenderness/nodules. No carotid bruit. No JVD.	  Cardiovascular: Regular rate and rhythm. S1 S2 normal. No murmurs, rubs or gallops.  Respiratory: Respirations unlabored. Clear to auscultation and percussion bilaterally. No wheeze. No ralesNo curvature.  Abdomen: Soft. Non-tender. Non-distended. No organomegaly. No masses. Normal bowel sounds.  Extremities: Warm to touch. No clubbing or cyanosis. No pedal edema.  Pulses: 2+ peripheral pulses all extremities.	  Skin: Normal skin color. No rashes or lesions. No ecchymoses. No cyanosis. Warm to touch.  Lymph Nodes: Cervical, supraclavicular and axillary nodes normal  Neurological: Motor and sensory examination equal and normal. A and O x 3  Psychiatry: Appropriate mood and affect.      LABS:                          11.1   11.23 )-----------( 250      ( 2022 06:29 )             32.7     CBC    WBC  11.23 <==, 15.90 <==    Hemoglobin  11.1 <<==, 13.0 <<==    Hematocrit  32.7 <==, 38.1 <==    Platelets  250 <==, 370 <==      131<L>  |  95<L>  |  67<H>  ----------------------------<  498<HH>    04-  5.2   |  19<L>  |  2.99<H>    LYTES    sodium  131 <==, 132 <==, 132 <==    potassium   5.2 <==, 4.9 <==, 4.6 <==    chloride  95 <==, 94 <==, 92 <==    carbon dioxide  19 <==, 21 <==, 21 <==    =============================================================================================  RENAL FUNCTION:    Creatinine:   2.99  <<==, 2.97  <<==, 3.06  <<==    BUN:   67 <==, 61 <==, 60 <==    ============================================================================================    calcium   10.3 <==, 10.8 <==, 11.3 <==    phos   3.7 <==, 3.6 <==    mag   2.0 <==, 2.0 <==    ============================================================================================  LFTs    AST:   19 <== , 27 <== , 29 <==     ALT:  78  <== , 91  <== , 97  <==     AP:  109  <=, 123  <=, 141  <=    Bili:  0.5  <=, 0.4  <=, 0.4  <=    PT/INR - ( 2022 06:29 )   PT: 12.3 sec;   INR: 1.06 ratio       PTT - ( 2022 06:29 )  PTT:23.9 sec    Venous Blood Gas:   @ 00:16  7.41/39/47/25/82.4  VBG Lactate: 2.1    Venous Blood Gas:   @ 21:56  7.34/49/22/26/30.8  VBG Lactate: 2.9    Procalcitonin, Serum: 0.10 ng/mL ( @ 21:56)    Serum Pro-Brain Natriuretic Peptide: 74080 pg/mL ( @ 06:28)  Serum Pro-Brain Natriuretic Peptide: 7795 pg/mL ( @ 21:56)    CARDIAC MARKERS ( 2022 06:28 )  CPK 32 U/L /CKMB x     /CKMB Units x        troponin 76 ng/L    CARDIAC MARKERS ( 2022 21:56 )  CPK 62 U/L /CKMB x     /CKMB Units 5.8 ng/mL    troponin 56 ng/L      MICROBIOLOGY:   Respiratory Viral Panel with COVID-19 by LAURA (22 @ 21:55)   Rapid RVP Result: Detected   SARS-CoV-2: Detected   This Respiratory Panel uses polymerase chain reaction (PCR) to detect for   adenovirus; coronavirus (HKU1, NL63, 229E, OC43); human metapneumovirus   (hMPV); human enterovirus/rhinovirus (Entero/RV); influenza A; influenza   A/H1; influenza A/H3; influenza A/H1-2009; influenza B; parainfluenza   viruses 1, 2, 3, 4; respiratory syncytial virus; Mycoplasma pneumoniae;   Chlamydophila pneumoniae; and SARS-CoV-2.     Urinalysis Basic - ( 2022 22:37 )    Color: Light Yellow / Appearance: Clear / S.018 / pH: x  Gluc: x / Ketone: Negative  / Bili: Negative / Urobili: Negative   Blood: x / Protein: Negative / Nitrite: Negative   Leuk Esterase: Negative / RBC: x / WBC x   Sq Epi: x / Non Sq Epi: x / Bacteria: x    RADIOLOGY:  [x ] Chest radiographs reviewed and interpreted by me    EXAM:  XR CHEST AP OR PA 1V                          PROCEDURE DATE:  2022        INTERPRETATION:  EXAMINATION: XR CHEST    CLINICAL INDICATION: Shortness of breath    TECHNIQUE: Single frontal, portable view of the chest was obtained.    COMPARISON: Chest x-ray 3/14/2017.    FINDINGS:  Left-sided dual-chamber AICD.  The heart is not accurately assessed in this AP projection.  Moderate pulmonary edema.  Small left pleural effusion.  No pneumothorax.    No acute bony abnormality.    IMPRESSION:  Moderate pulmonary edema.  Small left pleural effusion.    PRISCA RUTHERFORD MD; Resident Interventional Radiology  This document has been electronically signed.  TRISH ECHAVARRIA MD; Attending Radiologist  This document has been electronically signed. 2022  8:54AM  ---------------------------------------------------------------------------------------------------------------  EXAM:  CT CHEST      PROCEDURE DATE:  2021       FINDINGS:    AIRWAYS AND LUNGS: The central tracheobronchial tree is patent.  Bilateral upper lobe perihilar hilar consolidation with scattered perilymphatic nodules are overall slightly increased compared to the prior study. Nonvisualization prior right middle lobe1.6 cm nodule    MEDIASTINUM AND PLEURA: Calcified mediastinal lymph nodes. The visualized portion of the thyroid gland is unremarkable. Small left pleural effusion There is no pneumothorax.    HEART AND VESSELS: There is cardiomegaly.  There are atherosclerotic calcifications of the aorta and coronary arteries.  There is no pericardial effusion.    UPPER ABDOMEN: Images of the upper abdomen demonstrate no abnormality.    BONES AND SOFT TISSUES: The bones are unremarkable.  The soft tissues are unremarkable.    TUBES/LINES: None.    IMPRESSION:  Bilateral upper lobe perihilar hilar consolidation with scattered perilymphatic nodules are overall slightly increased compared to the prior study. Nonvisualization prior right middle lobe 1.6 cm nodule    DEANA LOPEZ MD; Attending Radiologist   This document has been electronically signed. Feb  3 2021  2:43PM  ---------------------------------------------------------------------------------------------------------------             [facility] - [facility] - CONSULT NOTE    HPI: 72 year old gentleman, lifelong non-smoker, followed by Dr. Chico Cabrales of our practice for obstructive sleep apnea (non-compliant with CPAP), asthma and sarcoidosis requiring intermittent month-long courses of steroids for "flares" (with likely cardiac involvement). He has a history of HTN, HLD, DM, non-ischemic cardiomyopathy now with an LVEF ~ 30% on ECHO 2022 s/p AICD implantation and CKD. The patient was admitted to [facility] on  shortly after arriving home from [facility] with shortness of breath, fatigue, weakness and chest pain. He was diuresed for pulmonary edema complicated by IRWIN. Subsequent IV fluid hydration was complicated by worsening heart failure requiring NIV for respiratory support. He continued to have APCs, PVCs and NSVT on telemetry and was started on amiodarone. Cardiac perfusion scan was negative for ischemia - fixed defects were felt to be related to an infiltrative process. CT scan -> scattered perilymphatic nodules increased in size from a prior study -> started on steroids/mepron by rheumatology (felt not to be a candidate for methotrexate due to CKD or infliximab or humira due to CHF). He was discharged on  feeling "well" on amiodarone, toprol XL and ASA - entresto and diuretics were held due to IRWIN - prednisone/mepron - toujeo/humalog (no oral hypoglycemics) - sodium bicarbonate - fluid restriction - plans for outpatient cardiac MRI or PET scan. The patient comes to the [place] ER yesterday with shortness of breath with acute hypoxic respiratory failure requiring NIV therapy. He has a cough productive of scant sputum. He has chest congestion and wheeze. No fevers, chills or sweats. No chest pain/pressure or palpitations. No lower extremity swelling, PND or orthopnea. No loss of the sense of taste or smell. He has been found to have COVID-19 infection. His respiratory symptoms have improved following a brisk diuresis and he has now off BIPAP and on a nasal canula @ 6lpm. Chest radiographs are abnormal. Asked to evaluate.    PMHX:  Asthma  Sarcoidosis with cardiac and pulmonary involvement  Obstructive sleep apnea  Pneumonia  Non-ischemic cardiomyopathy  HTN (hypertension)  HLD (hyperlipidemia)  DM (diabetes mellitus)  GERD (gastroesophageal reflux disease)  BPH (benign prostatic hyperplasia)  Deviated septum    PSHX:  AICD (automatic cardioverter/defibrillator) implantation  Ankle surgery  Knee surgery  Nasal septum surgery  Tonsillectomy and adenoidectomy    FAMILY HISTORY:  father - congestive heart failure - DM - CAD - CHF - CKD  mother - CAD    SOCIAL HISTORY:  lifelong non-smoker; works as an ;  but     Pulmonary Medications:   albuterol/ipratropium for Nebulization 3 milliLiter(s) Nebulizer every 6 hours  budesonide 160 MICROgram(s)/formoterol 4.5 MICROgram(s) Inhaler 2 Puff(s) Inhalation two times a day      Antimicrobials:      Cardiology:  aMIOdarone    Tablet 400 milliGRAM(s) Oral daily  furosemide   Injectable 40 milliGRAM(s) IV Push two times a day  metoprolol succinate ER 75 milliGRAM(s) Oral daily  tamsulosin 0.8 milliGRAM(s) Oral at bedtime      Other:  aspirin enteric coated 81 milliGRAM(s) Oral daily  dexAMETHasone     Tablet 6 milliGRAM(s) Oral daily  dextrose 5%. 1000 milliLiter(s) IV Continuous <Continuous>  dextrose 5%. 1000 milliLiter(s) IV Continuous <Continuous>  dextrose 50% Injectable 25 Gram(s) IV Push once  dextrose 50% Injectable 12.5 Gram(s) IV Push once  dextrose 50% Injectable 25 Gram(s) IV Push once  famotidine    Tablet 20 milliGRAM(s) Oral daily  glucagon  Injectable 1 milliGRAM(s) IntraMuscular once  heparin   Injectable 5000 Unit(s) SubCutaneous three times a day  insulin glargine Injectable (LANTUS) 20 Unit(s) SubCutaneous at bedtime  insulin lispro (ADMELOG) corrective regimen sliding scale   SubCutaneous Before meals and at bedtime  insulin lispro Injectable (ADMELOG) 5 Unit(s) SubCutaneous three times a day before meals    Prn:  MEDICATIONS  (PRN):  dextrose Oral Gel 15 Gram(s) Oral once PRN Blood Glucose LESS THAN 70 milliGRAM(s)/deciliter    Allergies    penicillin (Faint)    HOME MEDICATIONS: see  H & P    REVIEW OF SYSTEMS:  Constitutional: As per HPI  HEENT: Within normal limits  CV: As per HPI  Resp: As per HPI  GI: Within normal limits   : Within normal limits  Musculoskeletal: Within normal limits  Skin: Within normal limits  Neurological: Within normal limits  Psychiatric: Within normal limits  Endocrine: Within normal limits  Hematologic/Lymphatic: Within normal limits  Allergic/Immunologic: Within normal limits    [x] All other systems negative    OBJECTIVE:    Daily Height in cm: 182.88 (2022 03:38)    Daily Weight in k.2 (2022 09:20)    POCT Blood Glucose.: 536 mg/dL (2022 08:16)  POCT Blood Glucose.: 570 mg/dL (2022 08:14)  POCT Blood Glucose.: 549 mg/dL (2022 06:41)  POCT Blood Glucose.: 473 mg/dL (2022 06:39)  POCT Blood Glucose.: 407 mg/dL (2022 23:35)      PHYSICAL EXAM:  ICU Vital Signs Last 24 Hrs  T(C): 36.3 (2022 03:38), Max: 37.6 (2022 21:35)  T(F): 97.4 (2022 03:38), Max: 99.6 (2022 21:35)  HR: 78 (2022 10:01) (78 - 140)  BP: 122/74 (2022 03:38) (114/87 - 198/134)  BP(mean): 96 (2022 01:00) (94 - 153)  ABP: --  ABP(mean): --  RR: 20 (2022 03:38) (20 - 42)  SpO2: 97% (2022 10:01) (83% on room air - 100% 6lpm nasal canula)    General: Awake. Alert. Cooperative. No distress. Appears stated age 	  HEENT:  Atraumatic. Normocephalic. Anicteric. Normal oral mucosa. PERRL. EOMI.  Neck: Supple. Trachea midline. Thyroid without enlargement/tenderness/nodules. No carotid bruit. No JVD.	  Cardiovascular: Regular rate and rhythm. S1 S2 normal. No murmurs, rubs or gallops.  Respiratory: Respirations unlabored. Clear to auscultation and percussion bilaterally. No wheeze. No ralesNo curvature.  Abdomen: Soft. Non-tender. Non-distended. No organomegaly. No masses. Normal bowel sounds.  Extremities: Warm to touch. No clubbing or cyanosis. No pedal edema.  Pulses: 2+ peripheral pulses all extremities.	  Skin: Normal skin color. No rashes or lesions. No ecchymoses. No cyanosis. Warm to touch.  Lymph Nodes: Cervical, supraclavicular and axillary nodes normal  Neurological: Motor and sensory examination equal and normal. A and O x 3  Psychiatry: Appropriate mood and affect.      LABS:                          11.1   11.23 )-----------( 250      ( 2022 06:29 )             32.7     CBC    WBC  11.23 <==, 15.90 <==    Hemoglobin  11.1 <<==, 13.0 <<==    Hematocrit  32.7 <==, 38.1 <==    Platelets  250 <==, 370 <==      131<L>  |  95<L>  |  67<H>  ----------------------------<  498<HH>    04-26  5.2   |  19<L>  |  2.99<H>    LYTES    sodium  131 <==, 132 <==, 132 <==    potassium   5.2 <==, 4.9 <==, 4.6 <==    chloride  95 <==, 94 <==, 92 <==    carbon dioxide  19 <==, 21 <==, 21 <==    =============================================================================================  RENAL FUNCTION:    Creatinine:   2.99  <<==, 2.97  <<==, 3.06  <<==    BUN:   67 <==, 61 <==, 60 <==    ============================================================================================    calcium   10.3 <==, 10.8 <==, 11.3 <==    phos   3.7 <==, 3.6 <==    mag   2.0 <==, 2.0 <==    ============================================================================================  LFTs    AST:   19 <== , 27 <== , 29 <==     ALT:  78  <== , 91  <== , 97  <==     AP:  109  <=, 123  <=, 141  <=    Bili:  0.5  <=, 0.4  <=, 0.4  <=    PT/INR - ( 2022 06:29 )   PT: 12.3 sec;   INR: 1.06 ratio       PTT - ( 2022 06:29 )  PTT:23.9 sec    Venous Blood Gas:   @ 00:16  7.41/39/47/25/82.4  VBG Lactate: 2.1    Venous Blood Gas:   @ 21:56  7.34/49/22/26/30.8  VBG Lactate: 2.9    Procalcitonin, Serum: 0.10 ng/mL ( @ 21:56)    Serum Pro-Brain Natriuretic Peptide: 14458 pg/mL ( @ 06:28)  Serum Pro-Brain Natriuretic Peptide: 7795 pg/mL ( @ 21:56)    CARDIAC MARKERS ( 2022 06:28 )  CPK 32 U/L /CKMB x     /CKMB Units x        troponin 76 ng/L    CARDIAC MARKERS ( 2022 21:56 )  CPK 62 U/L /CKMB x     /CKMB Units 5.8 ng/mL    troponin 56 ng/L      MICROBIOLOGY:   Respiratory Viral Panel with COVID-19 by LAURA (22 @ 21:55)   Rapid RVP Result: Detected   SARS-CoV-2: Detected   This Respiratory Panel uses polymerase chain reaction (PCR) to detect for   adenovirus; coronavirus (HKU1, NL63, 229E, OC43); human metapneumovirus   (hMPV); human enterovirus/rhinovirus (Entero/RV); influenza A; influenza   A/H1; influenza A/H3; influenza A/H1-2009; influenza B; parainfluenza   viruses 1, 2, 3, 4; respiratory syncytial virus; Mycoplasma pneumoniae;   Chlamydophila pneumoniae; and SARS-CoV-2.     Urinalysis Basic - ( 2022 22:37 )    Color: Light Yellow / Appearance: Clear / S.018 / pH: x  Gluc: x / Ketone: Negative  / Bili: Negative / Urobili: Negative   Blood: x / Protein: Negative / Nitrite: Negative   Leuk Esterase: Negative / RBC: x / WBC x   Sq Epi: x / Non Sq Epi: x / Bacteria: x    RADIOLOGY:  [x ] Chest radiographs reviewed and interpreted by me    EXAM:  CT CHEST                          PROCEDURE DATE:  2022      FINDINGS:    AIRWAYS, LUNGS, PLEURA: Bilateral perihilar architectural lung distortion   with volume loss and few patchy peripheral bilateral lower lobe opacities   were present on 2/3/2021. Central left upper lobe consolidation and right   apical ground-glass opacities decreased compared to 2/3/2021.    Small bilateral pleural effusions, new on the right and unchanged on the   left.    MEDIASTINUM: Dilated left ventricle. No pericardial effusion. Thoracic   aorta normal caliber.  Calcified mediastinal nodes.    IMAGED ABDOMEN: Splenomegaly; 16.5 cm.    SOFT TISSUES: Unremarkable.    BONES: Unremarkable.    IMPRESSION:.    New small right pleural effusion and unchanged small left pleural   effusion.    Findings of thoracic sarcoidosis with interval improvement of left upper   lobe consolidation and right apical ground-glass opacities compared to prior.    TC OLSON MD; Attending Radiologist  This document has been electronically signed. 2022  1:00PM  ---------------------------------------------------------------------------------------------------------------  EXAM:  XR CHEST AP OR PA 1V                          PROCEDURE DATE:  2022      INTERPRETATION:  EXAMINATION: XR CHEST    CLINICAL INDICATION: Shortness of breath    TECHNIQUE: Single frontal, portable view of the chest was obtained.    COMPARISON: Chest x-ray 3/14/2017.    FINDINGS:  Left-sided dual-chamber AICD.  The heart is not accurately assessed in this AP projection.  Moderate pulmonary edema.  Small left pleural effusion.  No pneumothorax.    No acute bony abnormality.    IMPRESSION:  Moderate pulmonary edema.  Small left pleural effusion.    PRISCA RUTHERFORD MD; Resident Interventional Radiology  This document has been electronically signed.  TRISH ECHAVARRIA MD; Attending Radiologist  This document has been electronically signed. 2022  8:54AM  ---------------------------------------------------------------------------------------------------------------  EXAM:  CT CHEST      PROCEDURE DATE:  2021       FINDINGS:    AIRWAYS AND LUNGS: The central tracheobronchial tree is patent.  Bilateral upper lobe perihilar hilar consolidation with scattered perilymphatic nodules are overall slightly increased compared to the prior study. Nonvisualization prior right middle lobe1.6 cm nodule    MEDIASTINUM AND PLEURA: Calcified mediastinal lymph nodes. The visualized portion of the thyroid gland is unremarkable. Small left pleural effusion There is no pneumothorax.    HEART AND VESSELS: There is cardiomegaly.  There are atherosclerotic calcifications of the aorta and coronary arteries.  There is no pericardial effusion.    UPPER ABDOMEN: Images of the upper abdomen demonstrate no abnormality.    BONES AND SOFT TISSUES: The bones are unremarkable.  The soft tissues are unremarkable.    TUBES/LINES: None.    IMPRESSION:  Bilateral upper lobe perihilar hilar consolidation with scattered perilymphatic nodules are overall slightly increased compared to the prior study. Nonvisualization prior right middle lobe 1.6 cm nodule    DEANA LOPEZ MD; Attending Radiologist   This document has been electronically signed. Feb  3 2021  2:43PM  ---------------------------------------------------------------------------------------------------------------

## 2022-04-26 NOTE — ED ADULT NURSE NOTE - NSICDXFAMILYHX_GEN_ALL_CORE_FT
FAMILY HISTORY:  Father  Still living? Unknown  Family history of acute congestive heart failure, Age at diagnosis: Age Unknown  Family history of chronic renal failure syndrome, Age at diagnosis: Age Unknown    Mother  Still living? Unknown  Family history of heart disease, Age at diagnosis: Age Unknown

## 2022-04-26 NOTE — ED ADULT NURSE REASSESSMENT NOTE - NS ED NURSE REASSESS COMMENT FT1
Assisted pt with the urinal and assisted pt to position of comfort, pt requesting lights off to rest. Call bell and belongings within reach, pt and family member verbalizes understanding of call bell. Bed in lowest position, wheels locked, appropriate siderails up.  Safety maintained, comfort provided.

## 2022-04-26 NOTE — H&P ADULT - PROBLEM SELECTOR PLAN 4
- unknown baseline. Cr 1.21 in 2017  - pt states his cr was 3.5 @ [facility]  - trend Cr, avoid nephrotoxins

## 2022-04-26 NOTE — H&P ADULT - NSHPLABSRESULTS_GEN_ALL_CORE
Personally reviewed old records.  Personally reviewed labs.  Personally reviewed imaging.  Personally reviewed EKG. sinus tach, rate 106, qtc 464. TWI in I/AVL. CHA in V1-V2.                          13.0   15.90 )-----------( 370      ( 2022 21:56 )             38.1       04-    132<L>  |  94<L>  |  61<H>  ----------------------------<  446<H>  4.9   |  21<L>  |  2.97<H>    Ca    10.8<H>      2022 00:16  Phos  3.6       Mg     2.0         TPro  7.7  /  Alb  4.5  /  TBili  0.4  /  DBili  x   /  AST  27  /  ALT  91<H>  /  AlkPhos  123<H>        CARDIAC MARKERS ( 2022 21:56 )  x     / x     / 62 U/L / x     / 5.8 ng/mL        LIVER FUNCTIONS - ( 2022 00:16 )  Alb: 4.5 g/dL / Pro: 7.7 g/dL / ALK PHOS: 123 U/L / ALT: 91 U/L / AST: 27 U/L / GGT: x             PT/INR - ( 2022 21:56 )   PT: 11.1 sec;   INR: 0.96 ratio         PTT - ( 2022 21:56 )  PTT:26.9 sec    Urinalysis Basic - ( 2022 22:37 )    Color: Light Yellow / Appearance: Clear / S.018 / pH: x  Gluc: x / Ketone: Negative  / Bili: Negative / Urobili: Negative   Blood: x / Protein: Negative / Nitrite: Negative   Leuk Esterase: Negative / RBC: x / WBC x   Sq Epi: x / Non Sq Epi: x / Bacteria: x

## 2022-04-26 NOTE — H&P ADULT - PROBLEM SELECTOR PLAN 2
- start decadron 6 qd  - unable to start remdesivir 2/2 renal failure  - id consult in AM  - covid labs in AM  - heparin subq ppx

## 2022-04-26 NOTE — CONSULT NOTE ADULT - CONSULT REASON
dyspnea; pulmonary edema; obesity; obstructive sleep apnea; COVID infection; dyspnea; cough, nonischemic cardiomyopathy (cardiac sarcoidosis); pulmonary edema; restrictive lung disease; obesity; obstructive sleep apnea; COVID infection; acute hypoxic respiratory failure; dyspnea; cough, nonischemic cardiomyopathy (cardiac sarcoidosis); pulmonary edema; restrictive lung disease; obesity; obstructive sleep apnea; COVID infection; acute hypoxic respiratory failure; nonischemic cardiomyopathy (cardiac sarcoidosis); pulmonary edema; restrictive lung disease; obesity; obstructive sleep apnea;  dyspnea; cough; hiccups COVID infection; acute hypoxic respiratory failure; nonischemic cardiomyopathy (cardiac sarcoidosis); pulmonary edema; restrictive lung disease; obstructive sleep apnea;  dyspnea; cough; hiccups COVID infection; acute hypoxic respiratory failure; asthma exacerbation; nonischemic cardiomyopathy (cardiac sarcoidosis); pulmonary edema; pleural effusions; restrictive lung disease; obstructive sleep apnea;  dyspnea; cough; hiccups

## 2022-04-26 NOTE — CONSULT NOTE ADULT - ASSESSMENT
72M c hx HTN, pulmonary sarcoidosis, asthma, CHF (?reported EF 45%) s/p AICD, DM2, ?CKD, "extra heart beat" on amiodarone, intermittent intractable hiccups, recent hospitalization at [facility] (reportedly hospitalized for 12 days, d/c'd 4 days ago) for ?CP and CHF exacerbation, pw SOB and CP. pt with IRWIN and chf along with hypercalcemia    1- IRWIN suspected  2- chf  3- covid 19   4- HTN   5- sarcoidosis   6- hypercalcemia  7- DM       irwin vs ckd in this pt. he does not have proteinuria or signs of active sediment on his urinalysis therefore do not suspect diabetic nephropathy and less likely sarcoidosis as cause of his abn creatinine  to have renal sono when able   lasix 40 mg iv bid today and in am decrease to one daily dose   hypercalcemia certainly can cause irwin as well. ca is better. given sarcoidosis to have vit d 25, vit d 1,25 level  intact pth and phos as well as spep/ipep/benzuleyka henning  strict I/O  remdsivir and decadron for covid 19   insulin to control hyperglycemia

## 2022-04-26 NOTE — H&P ADULT - NSHPREVIEWOFSYSTEMS_GEN_ALL_CORE
REVIEW OF SYSTEMS:  CONSTITUTIONAL: No weakness. No fevers. No chills. No rigors. No weight loss. No night sweats. No poor appetite.  EYES: No blurry or double vision. No eye pain.  ENT: No hearing difficulty. No vertigo. No dysphagia. No sore throat. No Sinusitis/rhinorrhea.   NECK: No pain. No stiffness/rigidity.  CARDIAC: +chest pain. No palpitations. No lightheadedness. No syncope.  RESPIRATORY: +cough. +SOB. No hemoptysis.  GASTROINTESTINAL: No abdominal pain. No nausea. No vomiting. No hematemesis. No diarrhea. +constipation.   GENITOURINARY: No dysuria. No frequency. No hesitancy. No hematuria. No oliguria.  NEUROLOGICAL: No numbness/tingling. No focal weakness. No urinary or fecal incontinence. No headache. No unsteady gait.  BACK: No back pain. No flank pain.  EXTREMITIES: +minimal lower extremity edema. Full ROM. No joint pain.  SKIN: No rashes. No itching. No other lesions.  PSYCHIATRIC: No depression. No anxiety. No SI/HI.  ALLERGIC: No lip swelling. No hives.  All other review of systems is negative unless indicated above.  Unless indicated above, unable to assess ROS 2/2

## 2022-04-26 NOTE — CONSULT NOTE ADULT - SUBJECTIVE AND OBJECTIVE BOX
HPI:  72M c hx HTN, pulmonary sarcoidosis, asthma, CHF (?reported EF 45%) s/p AICD, DM2, ?CKD, "extra heart beat" on amiodarone, intermittent intractable hiccups, recent hospitalization at [place] (reportedly hospitalized for 12 days, d/c'd 4 days ago) for ?CP and CHF exacerbation, pw SOB and CP.    At discharge, pt states he was breathing well and feeling good. pt states his course at [facility] was a nuc pharm stress test which was reportedly normal. Pt reportedly diuresed with lasix. Pt also started on prednisone 30 for poss sarcoidosis. Pt is not sure why he was started on amiodarone, but states he was told he had an "extra heart beat". Pt also says his Cr was 3.5 while at [facility]. Pt states he did not have covid while at [facility].  Yesterday morning, awoke with mild SOB, worse with lying flat, improved with standing up. After eating dinner and one hour prior to arrival to ED, pt reports acute worsening of his SOB, chest tightness, coughing. Pt reports good compliance with his home meds. Pt states he does not eat any extra salt. At baseline very functional, completes his own IADLs, drives. Denies fevers, chills. Pt denies any relieving or exacerbating factors.  (26 Apr 2022 04:39)    Endocrinology consulted for steroid-induced hyperglycemia.    DM diagnosis:   Last A1c: 7.6  Endocrinologist:  Home DM meds:  Adherence:  Microvascular complications: Renal, ophthalmologic, neuropathy  Macrovascular complications: CVA, MI, PAD  SMBG:  BS range:  Symptoms:  Diet at home:  Appetite in the hospital:  PMHx: As above. No known hx of HF, pancreatitis or UTIs.  PSHx:  FHx: No known FHx of DM or thyroid cancer.  SHx:  Insurance:  Resides in:      PAST MEDICAL & SURGICAL HISTORY:  Asthma    Sarcoidosis    Ventricular dysfunction of heart    Diabetes mellitus type 2 in nonobese    BPH (benign prostatic hyperplasia)    AICD (automatic cardioverter/defibrillator) present        FAMILY HISTORY:  Family history of acute congestive heart failure (Father)  Father    Family history of chronic renal failure syndrome (Father)  Father    Family history of heart disease (Mother)  Mother        Social History:    Outpatient Medications: Home Medications:  Advair Diskus 500 mcg-50 mcg inhalation powder: 1 puff(s) inhaled 2 times a day (26 Apr 2022 05:04)  amiodarone 400 mg oral tablet: 1 tab(s) orally once a day (26 Apr 2022 05:04)  Aspirin Enteric Coated 81 mg oral delayed release tablet: 1 tab(s) orally once a day (26 Apr 2022 05:04)  Pepcid 20 mg oral tablet: 1 tab(s) orally 2 times a day (26 Apr 2022 05:04)  predniSONE 10 mg oral tablet: 3 tab(s) orally once a day (26 Apr 2022 05:04)  tamsulosin 0.4 mg oral capsule: 2 cap(s) orally once a day (26 Apr 2022 05:04)  Toprol-XL 50 mg oral tablet, extended release: 1.5 tab(s) orally once a day (26 Apr 2022 05:04)  Toujeo SoloStar 300 units/mL subcutaneous solution: 13 unit(s) subcutaneous once a day (at bedtime) (26 Apr 2022 05:04)      MEDICATIONS  (STANDING):  albuterol/ipratropium for Nebulization 3 milliLiter(s) Nebulizer every 6 hours  aMIOdarone    Tablet 400 milliGRAM(s) Oral daily  aspirin enteric coated 81 milliGRAM(s) Oral daily  baclofen 10 milliGRAM(s) Oral three times a day  buDESOnide    Inhalation Suspension 0.5 milliGRAM(s) Inhalation every 12 hours  chlorhexidine 2% Cloths 1 Application(s) Topical <User Schedule>  dexAMETHasone     Tablet 6 milliGRAM(s) Oral daily  dextrose 5%. 1000 milliLiter(s) (50 mL/Hr) IV Continuous <Continuous>  dextrose 5%. 1000 milliLiter(s) (100 mL/Hr) IV Continuous <Continuous>  dextrose 50% Injectable 25 Gram(s) IV Push once  dextrose 50% Injectable 12.5 Gram(s) IV Push once  dextrose 50% Injectable 25 Gram(s) IV Push once  famotidine    Tablet 20 milliGRAM(s) Oral daily  furosemide   Injectable 40 milliGRAM(s) IV Push two times a day  glucagon  Injectable 1 milliGRAM(s) IntraMuscular once  heparin   Injectable 5000 Unit(s) SubCutaneous three times a day  insulin glargine Injectable (LANTUS) 20 Unit(s) SubCutaneous at bedtime  insulin lispro (ADMELOG) corrective regimen sliding scale   SubCutaneous Before meals and at bedtime  insulin lispro Injectable (ADMELOG) 5 Unit(s) SubCutaneous three times a day before meals  metoprolol succinate ER 75 milliGRAM(s) Oral daily  tamsulosin 0.8 milliGRAM(s) Oral at bedtime    MEDICATIONS  (PRN):  dextrose Oral Gel 15 Gram(s) Oral once PRN Blood Glucose LESS THAN 70 milliGRAM(s)/deciliter      Allergies    penicillin (Faint)    Intolerances      Review of Systems:  Constitutional: No fever, chills   Neuro: No tremors, headache   Cardiovascular: No chest pain, palpitations  Respiratory: No SOB, no cough  GI: No nausea, vomiting, abdominal pain  : No dysuria, polyuria   Skin: no rash, ulcers   Psych: no depression, anxiety   Endocrine: no polyphagia, polydipsia     ALL OTHER SYSTEMS REVIEWED AND NEGATIVE        PHYSICAL EXAM:  VITALS: T(C): 36.7 (04-26-22 @ 11:56)  T(F): 98 (04-26-22 @ 11:56), Max: 99.6 (04-25-22 @ 21:35)  HR: 74 (04-26-22 @ 11:56) (74 - 140)  BP: 122/74 (04-26-22 @ 11:56) (114/87 - 198/134)  RR:  (18 - 42)  SpO2:  (83% - 100%)  Wt(kg): --  GENERAL: NAD, well-groomed, well-developed  EYES: No proptosis, anicteric  HEENT:  Atraumatic, Normocephalic, moist mucous membranes  THYROID: Normal size, no palpable nodules  RESPIRATORY: Clear to auscultation bilaterally; No rales, rhonchi, wheezing  CARDIOVASCULAR: Regular rate and rhythm; No murmurs  GI: Soft, nontender, non distended, normal bowel sounds  SKIN: Dry, intact, No rashes or lesions  NEURO: AOx3, moves all extremities spontaneously   PSYCH: Reactive affect, euthymic mood    POCT Blood Glucose.: 495 mg/dL (04-26-22 @ 12:25)  POCT Blood Glucose.: 536 mg/dL (04-26-22 @ 08:16)  POCT Blood Glucose.: 570 mg/dL (04-26-22 @ 08:14)  POCT Blood Glucose.: 549 mg/dL (04-26-22 @ 06:41)  POCT Blood Glucose.: 473 mg/dL (04-26-22 @ 06:39)  POCT Blood Glucose.: 407 mg/dL (04-25-22 @ 23:35)                            11.1   11.23 )-----------( 250      ( 26 Apr 2022 06:29 )             32.7       04-26    131<L>  |  92<L>  |  68<H>  ----------------------------<  x   4.8   |  21<L>  |  3.03<H>    EGFR if : x   EGFR if non : x     Ca    10.8<H>      04-26  Mg     2.0     04-26  Phos  3.7     04-26    TPro  7.2  /  Alb  4.1  /  TBili  0.5  /  DBili  x   /  AST  19  /  ALT  78<H>  /  AlkPhos  109  04-26      Thyroid Function Tests:  04-26 @ 08:53 TSH 0.62 FreeT4 -- T3 -- Anti TPO -- Anti Thyroglobulin Ab -- TSI --              Radiology:

## 2022-04-26 NOTE — CHART NOTE - NSCHARTNOTEFT_GEN_A_CORE
feels much improved  on 4L NC  no SOB at rest  appears deconditioned  abd soft, mild distension  sCHF  IRWIN  CKD IV    monitor intake and output on lasix   f/up cardio/renal recs    ?Amiodarone     defer further mgmt of steroid induced hyperglycemia to ENDO.  Given Lantus this AM, added mealtime insulin and raised coverage to moderate dose.    AG 17-18 stable without significant acidosis    check BMP q8  monitor hemodynamics  telemonitor    multi-organ dz  poor long term prognosis    d/w NP Jason, Tash/Bere, Endo    Den Malik MD, MHA, FACP, [facility]  Pager: [phone]  If no response or off-hours, page [phone]

## 2022-04-26 NOTE — CHART NOTE - NSCHARTNOTEFT_GEN_A_CORE
72M c hx HTN, pulmonary sarcoidosis, asthma, CHF (?reported EF 45%) s/p AICD, DM2, ?CKD, "extra heart beat" on amiodarone, intermittent intractable hiccups, recent hospitalization at [facility] (reportedly hospitalized for 12 days, d/c'd 4 days ago) for ?CP and CHF exacerbation, pw SOB and CP.    At discharge, pt states he was breathing well and feeling good. pt states his course at [facility] was a nuc pharm stress test which was reportedly normal. Pt reportedly diuresed with lasix. Pt also started on prednisone 30 for poss sarcoidosis. Pt is not sure why he was started on amiodarone, but states he was told he had an "extra heart beat". Pt also says his Cr was 3.5 while at [facility]. Pt states he did not have covid while at [facility].  Yesterday morning, awoke with mild SOB, worse with lying flat, improved with standing up. After eating dinner and one hour prior to arrival to ED, pt reports acute worsening of his SOB, chest tightness, coughing. Pt reports good compliance with his home meds. Pt states he does not eat any extra salt. At baseline very functional, completes his own IADLs, drives. Denies fevers, chills. Pt denies any relieving or exacerbating factors.  (26 Apr 2022 04:39)    Endocrinology consulted for steroid-induced hyperglycemia.    Unable to see patient in person as he was in Radiology.    Based on capillary and serum glucose trends, recommend the following:   -Increase Lantus to 25 units every morning. DO NOT HOLD IF NPO.  -Increase Admelog to 10 units TIDac. HOLD IF NPO.  -Continue moderate Admelog correction scale qac and qhs.    Orders placed.    Jose Manuel Godfrey DO, Endocrinology Fellow  Pager [phone] from 9am to 5pm. After hours and on weekends, please call [phone].

## 2022-04-26 NOTE — ED ADULT NURSE NOTE - NSICDXPASTMEDICALHX_GEN_ALL_CORE_FT
PAST MEDICAL HISTORY:  Asthma     BPH (benign prostatic hyperplasia)     Diabetes mellitus type 2 in nonobese     Sarcoidosis     Ventricular dysfunction of heart

## 2022-04-26 NOTE — H&P ADULT - PROBLEM SELECTOR PLAN 3
- tele  - trend CE  - lasix 40 IV BID  - TTE  - unknown what etiology of chf was  - would get AICD interrogated in AM - tele  - trend CE  - lasix 40 IV BID  - TTE  - unknown what etiology of chf was  - would get AICD interrogated in AM  - unclear why pt is amiodarone, will cont for now. f/u card as outpt

## 2022-04-26 NOTE — PROVIDER CONTACT NOTE (OTHER) - ACTION/TREATMENT ORDERED:
WALLACE Toro notified and aware. As per PA, push am PO meds to 0800 to allow for pt to be weaned off BiPAP.

## 2022-04-26 NOTE — CONSULT NOTE ADULT - SUBJECTIVE AND OBJECTIVE BOX
DATE OF SERVICE: 04-26-22 @ 22:34    CHIEF COMPLAINT:Patient is a 72y old  Male who presents with a chief complaint of sob, chest pain (26 Apr 2022 20:16)      HISTORY OF PRESENT ILLNESS:HPI:  72M c hx HTN, pulmonary sarcoidosis, asthma, CHF (?reported EF 45%) s/p AICD, DM2, ?CKD, "extra heart beat" on amiodarone, intermittent intractable hiccups, recent hospitalization at [facility] (reportedly hospitalized for 12 days, d/c'd 4 days ago) for ?CP and CHF exacerbation, pw SOB and CP.    At discharge, pt states he was breathing well and feeling good. pt states his course at [facility] was a nuc pharm stress test which was reportedly normal. Pt reportedly diuresed with lasix. Pt also started on prednisone 30 for poss sarcoidosis. Pt is not sure why he was started on amiodarone, but states he was told he had an "extra heart beat". Pt also says his Cr was 3.5 while at [facility]. Pt states he did not have covid while at [facility].  Yesterday morning, awoke with mild SOB, worse with lying flat, improved with standing up. After eating dinner and one hour prior to arrival to ED, pt reports acute worsening of his SOB, chest tightness, coughing. Pt reports good compliance with his home meds. Pt states he does not eat any extra salt. At baseline very functional, completes his own IADLs, drives. Denies fevers, chills. Pt denies any relieving or exacerbating factors.  (26 Apr 2022 04:39)      PAST MEDICAL & SURGICAL HISTORY:  Asthma    Sarcoidosis    Ventricular dysfunction of heart    Diabetes mellitus type 2 in nonobese    BPH (benign prostatic hyperplasia)    AICD (automatic cardioverter/defibrillator) present            MEDICATIONS:  aMIOdarone    Tablet 400 milliGRAM(s) Oral daily  aspirin enteric coated 81 milliGRAM(s) Oral daily  furosemide   Injectable 40 milliGRAM(s) IV Push two times a day  heparin   Injectable 5000 Unit(s) SubCutaneous three times a day  metoprolol succinate ER 75 milliGRAM(s) Oral daily  tamsulosin 0.8 milliGRAM(s) Oral at bedtime      albuterol/ipratropium for Nebulization 3 milliLiter(s) Nebulizer every 6 hours  buDESOnide    Inhalation Suspension 0.5 milliGRAM(s) Inhalation every 12 hours    baclofen 10 milliGRAM(s) Oral three times a day    famotidine    Tablet 20 milliGRAM(s) Oral daily    dexAMETHasone     Tablet 6 milliGRAM(s) Oral daily  dextrose 50% Injectable 25 Gram(s) IV Push once  dextrose 50% Injectable 12.5 Gram(s) IV Push once  dextrose 50% Injectable 25 Gram(s) IV Push once  dextrose Oral Gel 15 Gram(s) Oral once PRN  glucagon  Injectable 1 milliGRAM(s) IntraMuscular once  insulin lispro (ADMELOG) corrective regimen sliding scale   SubCutaneous three times a day before meals  insulin lispro (ADMELOG) corrective regimen sliding scale   SubCutaneous at bedtime  insulin lispro Injectable (ADMELOG) 10 Unit(s) SubCutaneous three times a day before meals    chlorhexidine 2% Cloths 1 Application(s) Topical <User Schedule>  dextrose 5%. 1000 milliLiter(s) IV Continuous <Continuous>  dextrose 5%. 1000 milliLiter(s) IV Continuous <Continuous>      FAMILY HISTORY:  Family history of acute congestive heart failure (Father)  Father    Family history of chronic renal failure syndrome (Father)  Father    Family history of heart disease (Mother)  Mother        Non-contributory    SOCIAL HISTORY:    [ ] Tobacco  [ ] Drugs  [ ] Alcohol    Allergies    penicillin (Faint)    Intolerances    	    REVIEW OF SYSTEMS:  CONSTITUTIONAL: No fever  EYES: No eye pain, visual disturbances, or discharge  ENMT:  No difficulty hearing, tinnitus  NECK: No pain or stiffness  RESPIRATORY: No cough, wheezing,  CARDIOVASCULAR: No chest pain, palpitations, passing out, dizziness, or leg swelling  GASTROINTESTINAL:  No nausea, vomiting, diarrhea or constipation. No melena.  GENITOURINARY: No dysuria, hematuria  NEUROLOGICAL: No stroke like symptoms  SKIN: No burning or lesions   ENDOCRINE: No heat or cold intolerance  MUSCULOSKELETAL: No joint pain or swelling  PSYCHIATRIC: No  anxiety, mood swings  HEME/LYMPH: No bleeding gums  ALLERGY AND IMMUNOLOGIC: No hives or eczema	    All other ROS negative    PHYSICAL EXAM:  T(C): 36.7 (04-26-22 @ 21:04), Max: 36.7 (04-26-22 @ 11:56)  HR: 74 (04-26-22 @ 21:04) (74 - 99)  BP: 126/78 (04-26-22 @ 21:04) (114/87 - 145/92)  RR: 18 (04-26-22 @ 21:04) (18 - 35)  SpO2: 99% (04-26-22 @ 21:04) (97% - 100%)  Wt(kg): --  I&O's Summary    25 Apr 2022 07:01  -  26 Apr 2022 07:00  --------------------------------------------------------  IN: 0 mL / OUT: 1300 mL / NET: -1300 mL    26 Apr 2022 07:01  -  26 Apr 2022 22:34  --------------------------------------------------------  IN: 1270 mL / OUT: 1200 mL / NET: 70 mL        Appearance: Normal	  HEENT:   Normal oral mucosa, EOMI	  Cardiovascular:  S1 S2, No JVD,    Respiratory: Lungs clear to auscultation	  Psychiatry: Alert  Gastrointestinal:  Soft, Non-tender, + BS	  Skin: No rashes   Neurologic: Non-focal  Extremities:  No edema  Vascular: Peripheral pulses palpable    	    	  	  CARDIAC MARKERS:  Labs personally reviewed by me                                  11.1   11.23 )-----------( 250      ( 26 Apr 2022 06:29 )             32.7     04-26    128<L>  |  91<L>  |  78<H>  ----------------------------<  395<H>  4.6   |  20<L>  |  3.12<H>    Ca    10.6<H>      26 Apr 2022 21:39  Phos  3.7     04-26  Mg     2.0     04-26    TPro  7.2  /  Alb  4.1  /  TBili  0.5  /  DBili  x   /  AST  19  /  ALT  78<H>  /  AlkPhos  109  04-26          TTE Conclusions:  1. Normal mitral valve.Mild mitral regurgitation.  2. Normal left atrium.  LA volume index = 31 cc/m2.  3. Moderate global left ventricular systolic dysfunction.  LVEF calculated is 40%. Moderate global hypokinesis.  Moderate diastolic dysfunction (Stage II).  4. Normal right atrium.Normal right ventricular size and  function. A device wire is noted in the right heart.Normal  tricuspid valve. Mild tricuspid regurgitation.Estimated  pulmonary artery systolic pressure equals 41  mm Hg,  assuming right atrial pressure equals 3 mm Hg, consistent  with mild pulmonary pressures.  *** Compared with echocardiogram of 3/15/2017, no  significant changes noted.      Radiology: Personally reviewed by me -   < from: CT Chest No Cont (04.26.22 @ 12:51) >  IMPRESSION:.    New small right pleural effusion and unchanged small left pleural   effusion.    Findings of thoracic sarcoidosis with interval improvement of left upper   lobe consolidation and right apical ground-glass opacities compared to   2/3/2021.    < end of copied text >      Assessment and Plan:   Assessment:  · Assessment	  72M c hx HTN, pulmonary sarcoidosis, asthma, CHF (?reported EF 45%) s/p AICD, DM2, ?CKD, "extra heart beat" on amiodarone, intermittent intractable hiccups, recent hospitalization at [facility] (reportedly hospitalized for 12 days, d/c'd 4 days ago) for ?CP and CHF exacerbation, pw acute hypoxic respiratory failure, sepsis, IRWIN, 2/2 covid and poss CHF exacerbation, c/b steroid induced hyperglycemia    Problem/Plan - 1:  ·  Problem: Acute systolic HF  ·  Plan: HF with superimposed COVID  - much improved with diuresis  - BNP at discharge at [facility] 1900, now >11.6K  - Continue with Lasix IV and decrease to daily in AM    Problem/Plan - 2:  ·  Problem: Pneumonia due to COVID-19 virus.   ·  Plan: - Decadron 6 qd as per primary team  - unable to start remdesivir 2/2 renal failure  - id consult in AM    Problem/Plan - 3:  - PVCs/?NSVT at [facility] and was started on Amio at the time  - discussed with OP cardio Stanislav Bell and Fanny, will hold given risk for lung toxicity    Problem/Plan - 4:  ·  Problem: Chest pain.   ·  Plan: Recent Stress test at [facility] with small inferior wall infarct  - likely 2/2 HF   - EF here on echo actually improved compared to echo at [facility]    Problem/Plan - 5:  ·  Problem: Sarcoidosis.   ·  Plan:   - cont steroids           Differential diagnosis and plan of care discussed with patient after the evaluation. Counseling on diet, nutritional counseling, weight management, exercise and medication compliance was done.   Advanced care planning/advanced directives discussed with patient/family. DNR status including forceful chest compressions to attempt to restart the heart, ventilator support/artificial breathing, electric shock, artificial nutrition, health care proxy, Molst form all discussed with pt. Pt wishes to consider. More than fifteen minutes spent on discussing advanced directives.          David Blackburn DO [facility]  Cardiovascular Medicine  [address] [place] [address], [address]  Office [phone]  Cell [phone]

## 2022-04-26 NOTE — H&P ADULT - PROBLEM SELECTOR PLAN 1
- unclear if chf exacerbation vs covid pna on top of chronic sarcoidosis  - cont bipap for now  - will get CT chest to better evaluate  - needs pulm consult in AM

## 2022-04-26 NOTE — PATIENT PROFILE ADULT - FALL HARM RISK - RISK INTERVENTIONS

## 2022-04-26 NOTE — ED ADULT NURSE NOTE - OBJECTIVE STATEMENT
73 y/o M with PMH of HTN, CHF s/p AICD, IDDM, CKD, sarcoidosis presenting to the ED for worsening SOB.  Pt was recently seen and discharged 3 days ago at [facility] for CHF exacerbation. Pt states that he was given diuretics at [place] but was not prescribed any at home. Pt states that the SOB was mild this morning but got progressively throughout the day. Pt presented to the ED breathing rapidly with accessory muscle use and unable to speak full sentences. Pt endorses associated c/p and weaknesss. Denies fevers, chlls, n/v/d, dizziness. AOX3, ALTMAN, distal pulses intact, abd soft nontender/nondistended, +1 pitting edema noted to BLE. Bed in lowest position, wheels locked, appropriate siderails up. Call bell and belongings within reach. Pt verbalizes understanding of call bell. Safety maintained, comfort provided. Pt placed on bipap and cardiac monitor. Will continue to monitor. 73 y/o M with PMH of HTN, CHF s/p AICD, Insulin dependent DM, CKD, sarcoidosis presenting to the ED for worsening SOB.  Pt was recently seen and discharged 3 days ago at [facility] for CHF exacerbation. Pt states that he was given diuretics at [place] but was not prescribed any at home. Pt states that the SOB was mild this morning but got progressively throughout the day. Pt presented to the ED breathing rapidly with accessory muscle use and unable to speak full sentences. Pt endorses associated c/p and weakness. Denies fevers, chlls, n/v/d, dizziness. AOX3, ALTMAN, distal pulses intact, abd soft nontender/nondistended, +1 pitting edema noted to BLE. Bed in lowest position, wheels locked, appropriate siderails up. Call bell and belongings within reach. Pt verbalizes understanding of call bell. Safety maintained, comfort provided. Pt placed on bipap and cardiac monitor. Will continue to monitor. 73 y/o M with PMH of HTN, CHF s/p AICD, Insulin dependent DM, CKD, sarcoidosis presenting to the ED for worsening SOB.  Pt was recently seen and discharged 3 days ago at [facility] for CHF exacerbation. Pt states that he was given diuretics at [place] but was not prescribed any at home. Pt states that the SOB was mild this morning but got progressively throughout the day. Pt presented to the ED breathing rapidly with accessory muscle use and unable to speak full sentences. Pt endorses associated c/p and weakness. Denies fevers, chlls, n/v/d, dizziness. AOX3, ALTMAN, distal pulses intact, abd soft nontender/nondistended, +1 pitting edema noted to BLE. Bed in lowest position, wheels locked, appropriate siderails up. Call bell and belongings within reach. Pt verbalizes understanding of call bell. Safety maintained, comfort provided. Pt placed on bipap and cardiac monitor.

## 2022-04-27 DIAGNOSIS — E11.9 TYPE 2 DIABETES MELLITUS WITHOUT COMPLICATIONS: ICD-10-CM

## 2022-04-27 DIAGNOSIS — E11.65 TYPE 2 DIABETES MELLITUS WITH HYPERGLYCEMIA: ICD-10-CM

## 2022-04-27 DIAGNOSIS — I10 ESSENTIAL (PRIMARY) HYPERTENSION: ICD-10-CM

## 2022-04-27 DIAGNOSIS — R73.9 HYPERGLYCEMIA, UNSPECIFIED: ICD-10-CM

## 2022-04-27 DIAGNOSIS — Z29.9 ENCOUNTER FOR PROPHYLACTIC MEASURES, UNSPECIFIED: ICD-10-CM

## 2022-04-27 LAB
24R-OH-CALCIDIOL SERPL-MCNC: 30.5 NG/ML — SIGNIFICANT CHANGE UP (ref 30–80)
ALBUMIN SERPL ELPH-MCNC: 3.7 G/DL — SIGNIFICANT CHANGE UP (ref 3.3–5)
ALP SERPL-CCNC: 90 U/L — SIGNIFICANT CHANGE UP (ref 40–120)
ALT FLD-CCNC: 58 U/L — HIGH (ref 10–45)
ANION GAP SERPL CALC-SCNC: 15 MMOL/L — SIGNIFICANT CHANGE UP (ref 5–17)
ANION GAP SERPL CALC-SCNC: 16 MMOL/L — SIGNIFICANT CHANGE UP (ref 5–17)
ANION GAP SERPL CALC-SCNC: 19 MMOL/L — HIGH (ref 5–17)
AST SERPL-CCNC: 14 U/L — SIGNIFICANT CHANGE UP (ref 10–40)
B-OH-BUTYR SERPL-SCNC: 0.1 MMOL/L — SIGNIFICANT CHANGE UP
BASOPHILS # BLD AUTO: 0.01 K/UL — SIGNIFICANT CHANGE UP (ref 0–0.2)
BASOPHILS NFR BLD AUTO: 0.1 % — SIGNIFICANT CHANGE UP (ref 0–2)
BILIRUB SERPL-MCNC: 0.4 MG/DL — SIGNIFICANT CHANGE UP (ref 0.2–1.2)
BUN SERPL-MCNC: 80 MG/DL — HIGH (ref 7–23)
BUN SERPL-MCNC: 82 MG/DL — HIGH (ref 7–23)
BUN SERPL-MCNC: 90 MG/DL — HIGH (ref 7–23)
CALCIUM SERPL-MCNC: 10 MG/DL — SIGNIFICANT CHANGE UP (ref 8.4–10.5)
CALCIUM SERPL-MCNC: 10.7 MG/DL — HIGH (ref 8.4–10.5)
CHLORIDE SERPL-SCNC: 90 MMOL/L — LOW (ref 96–108)
CHLORIDE SERPL-SCNC: 93 MMOL/L — LOW (ref 96–108)
CHLORIDE SERPL-SCNC: 93 MMOL/L — LOW (ref 96–108)
CO2 SERPL-SCNC: 21 MMOL/L — LOW (ref 22–31)
CO2 SERPL-SCNC: 22 MMOL/L — SIGNIFICANT CHANGE UP (ref 22–31)
CO2 SERPL-SCNC: 23 MMOL/L — SIGNIFICANT CHANGE UP (ref 22–31)
CREAT SERPL-MCNC: 3.03 MG/DL — HIGH (ref 0.5–1.3)
CREAT SERPL-MCNC: 3.2 MG/DL — HIGH (ref 0.5–1.3)
CREAT SERPL-MCNC: 3.24 MG/DL — HIGH (ref 0.5–1.3)
CULTURE RESULTS: SIGNIFICANT CHANGE UP
EGFR: 20 ML/MIN/1.73M2 — LOW
EGFR: 20 ML/MIN/1.73M2 — LOW
EGFR: 21 ML/MIN/1.73M2 — LOW
EOSINOPHIL # BLD AUTO: 0 K/UL — SIGNIFICANT CHANGE UP (ref 0–0.5)
EOSINOPHIL NFR BLD AUTO: 0 % — SIGNIFICANT CHANGE UP (ref 0–6)
GLUCOSE BLDC GLUCOMTR-MCNC: 280 MG/DL — HIGH (ref 70–99)
GLUCOSE BLDC GLUCOMTR-MCNC: 351 MG/DL — HIGH (ref 70–99)
GLUCOSE BLDC GLUCOMTR-MCNC: 376 MG/DL — HIGH (ref 70–99)
GLUCOSE BLDC GLUCOMTR-MCNC: 377 MG/DL — HIGH (ref 70–99)
GLUCOSE BLDC GLUCOMTR-MCNC: 417 MG/DL — HIGH (ref 70–99)
GLUCOSE SERPL-MCNC: 323 MG/DL — HIGH (ref 70–99)
GLUCOSE SERPL-MCNC: 326 MG/DL — HIGH (ref 70–99)
GLUCOSE SERPL-MCNC: 329 MG/DL — HIGH (ref 70–99)
HCT VFR BLD CALC: 28.9 % — LOW (ref 39–50)
HCV AB S/CO SERPL IA: 0.1 S/CO — SIGNIFICANT CHANGE UP (ref 0–0.99)
HCV AB SERPL-IMP: SIGNIFICANT CHANGE UP
HGB BLD-MCNC: 9.9 G/DL — LOW (ref 13–17)
IMM GRANULOCYTES NFR BLD AUTO: 1 % — SIGNIFICANT CHANGE UP (ref 0–1.5)
LYMPHOCYTES # BLD AUTO: 0.32 K/UL — LOW (ref 1–3.3)
LYMPHOCYTES # BLD AUTO: 4.6 % — LOW (ref 13–44)
MAGNESIUM SERPL-MCNC: 2.1 MG/DL — SIGNIFICANT CHANGE UP (ref 1.6–2.6)
MCHC RBC-ENTMCNC: 30.9 PG — SIGNIFICANT CHANGE UP (ref 27–34)
MCHC RBC-ENTMCNC: 34.3 GM/DL — SIGNIFICANT CHANGE UP (ref 32–36)
MCV RBC AUTO: 90.3 FL — SIGNIFICANT CHANGE UP (ref 80–100)
MONOCYTES # BLD AUTO: 0.71 K/UL — SIGNIFICANT CHANGE UP (ref 0–0.9)
MONOCYTES NFR BLD AUTO: 10.3 % — SIGNIFICANT CHANGE UP (ref 2–14)
NEUTROPHILS # BLD AUTO: 5.79 K/UL — SIGNIFICANT CHANGE UP (ref 1.8–7.4)
NEUTROPHILS NFR BLD AUTO: 84 % — HIGH (ref 43–77)
NRBC # BLD: 0 /100 WBCS — SIGNIFICANT CHANGE UP (ref 0–0)
PHOSPHATE SERPL-MCNC: 4.5 MG/DL — SIGNIFICANT CHANGE UP (ref 2.5–4.5)
PLATELET # BLD AUTO: 232 K/UL — SIGNIFICANT CHANGE UP (ref 150–400)
POTASSIUM SERPL-MCNC: 4 MMOL/L — SIGNIFICANT CHANGE UP (ref 3.5–5.3)
POTASSIUM SERPL-SCNC: 4 MMOL/L — SIGNIFICANT CHANGE UP (ref 3.5–5.3)
PROT SERPL-MCNC: 6.4 G/DL — SIGNIFICANT CHANGE UP (ref 6–8.3)
PROT SERPL-MCNC: 6.4 G/DL — SIGNIFICANT CHANGE UP (ref 6–8.3)
PROT SERPL-MCNC: 6.6 G/DL — SIGNIFICANT CHANGE UP (ref 6–8.3)
PTH-INTACT FLD-MCNC: 12 PG/ML — LOW (ref 15–65)
RBC # BLD: 3.2 M/UL — LOW (ref 4.2–5.8)
RBC # FLD: 13.4 % — SIGNIFICANT CHANGE UP (ref 10.3–14.5)
SODIUM SERPL-SCNC: 130 MMOL/L — LOW (ref 135–145)
SODIUM SERPL-SCNC: 131 MMOL/L — LOW (ref 135–145)
SODIUM SERPL-SCNC: 131 MMOL/L — LOW (ref 135–145)
SPECIMEN SOURCE: SIGNIFICANT CHANGE UP
VIT D25+D1,25 OH+D1,25 PNL SERPL-MCNC: 44.5 PG/ML — SIGNIFICANT CHANGE UP (ref 19.9–79.3)
VIT D25+D1,25 OH+D1,25 PNL SERPL-MCNC: 45.8 PG/ML — SIGNIFICANT CHANGE UP (ref 19.9–79.3)
WBC # BLD: 6.9 K/UL — SIGNIFICANT CHANGE UP (ref 3.8–10.5)
WBC # FLD AUTO: 6.9 K/UL — SIGNIFICANT CHANGE UP (ref 3.8–10.5)

## 2022-04-27 PROCEDURE — 99233 SBSQ HOSP IP/OBS HIGH 50: CPT

## 2022-04-27 PROCEDURE — 99222 1ST HOSP IP/OBS MODERATE 55: CPT | Mod: GC

## 2022-04-27 RX ORDER — CLONAZEPAM 1 MG
1 TABLET ORAL
Refills: 0 | Status: DISCONTINUED | OUTPATIENT
Start: 2022-04-27 | End: 2022-04-30

## 2022-04-27 RX ORDER — FUROSEMIDE 40 MG
40 TABLET ORAL DAILY
Refills: 0 | Status: DISCONTINUED | OUTPATIENT
Start: 2022-04-28 | End: 2022-04-30

## 2022-04-27 RX ORDER — ONDANSETRON 8 MG/1
4 TABLET, FILM COATED ORAL ONCE
Refills: 0 | Status: COMPLETED | OUTPATIENT
Start: 2022-04-27 | End: 2022-04-28

## 2022-04-27 RX ORDER — SENNA PLUS 8.6 MG/1
2 TABLET ORAL AT BEDTIME
Refills: 0 | Status: DISCONTINUED | OUTPATIENT
Start: 2022-04-27 | End: 2022-04-30

## 2022-04-27 RX ORDER — POLYETHYLENE GLYCOL 3350 17 G/17G
17 POWDER, FOR SOLUTION ORAL DAILY
Refills: 0 | Status: DISCONTINUED | OUTPATIENT
Start: 2022-04-27 | End: 2022-04-30

## 2022-04-27 RX ORDER — PANTOPRAZOLE SODIUM 20 MG/1
40 TABLET, DELAYED RELEASE ORAL DAILY
Refills: 0 | Status: DISCONTINUED | OUTPATIENT
Start: 2022-04-27 | End: 2022-04-30

## 2022-04-27 RX ORDER — INSULIN GLARGINE 100 [IU]/ML
32 INJECTION, SOLUTION SUBCUTANEOUS EVERY MORNING
Refills: 0 | Status: DISCONTINUED | OUTPATIENT
Start: 2022-04-27 | End: 2022-04-29

## 2022-04-27 RX ORDER — INSULIN LISPRO 100/ML
18 VIAL (ML) SUBCUTANEOUS
Refills: 0 | Status: DISCONTINUED | OUTPATIENT
Start: 2022-04-27 | End: 2022-04-29

## 2022-04-27 RX ORDER — FUROSEMIDE 40 MG
40 TABLET ORAL DAILY
Refills: 0 | Status: DISCONTINUED | OUTPATIENT
Start: 2022-04-27 | End: 2022-04-27

## 2022-04-27 RX ADMIN — Medication 10 UNIT(S): at 12:23

## 2022-04-27 RX ADMIN — INSULIN GLARGINE 25 UNIT(S): 100 INJECTION, SOLUTION SUBCUTANEOUS at 08:15

## 2022-04-27 RX ADMIN — Medication 3 MILLILITER(S): at 11:32

## 2022-04-27 RX ADMIN — FAMOTIDINE 20 MILLIGRAM(S): 10 INJECTION INTRAVENOUS at 11:32

## 2022-04-27 RX ADMIN — Medication 3 MILLILITER(S): at 06:10

## 2022-04-27 RX ADMIN — Medication 0.5 MILLIGRAM(S): at 17:04

## 2022-04-27 RX ADMIN — HEPARIN SODIUM 5000 UNIT(S): 5000 INJECTION INTRAVENOUS; SUBCUTANEOUS at 21:38

## 2022-04-27 RX ADMIN — Medication 10 MILLIGRAM(S): at 14:28

## 2022-04-27 RX ADMIN — Medication 3 MILLILITER(S): at 00:58

## 2022-04-27 RX ADMIN — Medication 6 MILLIGRAM(S): at 06:10

## 2022-04-27 RX ADMIN — HEPARIN SODIUM 5000 UNIT(S): 5000 INJECTION INTRAVENOUS; SUBCUTANEOUS at 14:28

## 2022-04-27 RX ADMIN — Medication 10: at 12:22

## 2022-04-27 RX ADMIN — HEPARIN SODIUM 5000 UNIT(S): 5000 INJECTION INTRAVENOUS; SUBCUTANEOUS at 06:16

## 2022-04-27 RX ADMIN — Medication 3 MILLILITER(S): at 17:04

## 2022-04-27 RX ADMIN — Medication 75 MILLIGRAM(S): at 06:10

## 2022-04-27 RX ADMIN — Medication 81 MILLIGRAM(S): at 11:32

## 2022-04-27 RX ADMIN — Medication 40 MILLIGRAM(S): at 06:11

## 2022-04-27 RX ADMIN — TAMSULOSIN HYDROCHLORIDE 0.8 MILLIGRAM(S): 0.4 CAPSULE ORAL at 21:35

## 2022-04-27 RX ADMIN — AMIODARONE HYDROCHLORIDE 400 MILLIGRAM(S): 400 TABLET ORAL at 06:10

## 2022-04-27 RX ADMIN — Medication 1 MILLIGRAM(S): at 21:38

## 2022-04-27 RX ADMIN — Medication 10: at 17:03

## 2022-04-27 RX ADMIN — Medication 10: at 08:15

## 2022-04-27 RX ADMIN — Medication 10 UNIT(S): at 08:15

## 2022-04-27 RX ADMIN — Medication 0.5 MILLIGRAM(S): at 06:16

## 2022-04-27 RX ADMIN — Medication 10 MILLIGRAM(S): at 06:11

## 2022-04-27 NOTE — PROGRESS NOTE ADULT - ASSESSMENT
ASSESSMENT:    72 year old gentleman, lifelong non-smoker, followed by Dr. Chico Cabrales of our practice for obstructive sleep apnea (non-compliant with CPAP), asthma and sarcoidosis requiring intermittent month-long courses of steroids for "flares" (with presumed cardiac involvement). He has a history of HTN, HLD, DM, non-ischemic cardiomyopathy now with an LVEF ~ 30% on ECHO 4/14/2022 s/p AICD implantation and CKD. The patient was admitted to [facility] on 4/13 shortly after arriving home from [facility] with shortness of breath, fatigue, weakness and chest pain. He was diuresed for pulmonary edema complicated by IRWIN. Subsequent IV fluid hydration was complicated by worsening heart failure requiring NIV for respiratory support. He continued to have APCs, PVCs and NSVT on telemetry and was started on amiodarone. Cardiac perfusion scan was negative for ischemia - fixed defects were felt to be related to an infiltrative process. CT scan -> scattered perilymphatic nodules increased in size from a prior study -> started on steroids/mepron by rheumatology (felt not to be a candidate for methotrexate due to CKD or infliximab or humira due to CHF). He was discharged on 4/23 feeling "well" on amiodarone, toprol XL and ASA - entresto and diuretics were held due to IRWIN - prednisone/mepron - toujeo/humalog (no oral hypoglycemics) - sodium bicarbonate - fluid restriction - plans for outpatient cardiac MRI or PET scan. The patient comes to the [place] ER yesterday with shortness of breath with acute hypoxic respiratory failure requiring NIV therapy. He has a cough productive of scant sputum. He has chest congestion and wheeze. No fevers, chills or sweats. No chest pain/pressure or palpitations. No lower extremity swelling, PND or orthopnea. No loss of the sense of taste or smell. He has been found to have COVID-19 infection. His respiratory symptoms have improved following a brisk diuresis and he has now off BIPAP and on a nasal canula @ 6lpm    acute hypoxic respiratory failure -> multifactorial  1) COVID infection without evidence of COVID pneumonia  2) underlying pulmonary sarcoidosis without evidence of exacerbation  3) cardiac sarcoidosis with a non-ischemic cardiomyopathy - suspect pulmonary edema after discharge from [facility] off entresto and diuretics due to IRWIN  4) moderate persistent asthma with bronchospasm -> resolved - cardiac asthma versus asthma exacerbation    4/27 - unable to sleep due to chronic bedtime anxiety; feels well; no shortness of breath or hypoxemia on room air; no cough, sputum production, hemoptysis, chest congestion or wheeze; no fevers, chills or sweats; no chest pain/pressure or palpitations; good appetite; no fatigue, malaise or weakness; renal evaluation for CKD ongoing    PLAN/RECOMMENDATIONS:    stable oxygenation on room air  CT chest to better evaluate the lung parenchyma -> "to my eye" -> bilateral perihilar architectural lung distortion with volume loss with a few patchy peripheral bilateral lower lobe opacities are without change - calcified mediastinal lymph nodes - superimposed small right and trace left pleural effusion - mild scattered areas of groundglass opacities c/w pulmonary edema  albuterol/atrovent nebs q6h  pulmicort 0.5mg nebs q12h - give after duoneb - rinse mouth after use  baclofen for recurrent intractable hiccups  holding remdesivir due to renal dysfunction  decadron 6mg po daily x 10 days - glucose control on steroids (no indication for systemic steroids for sarcoidosis)  diligent DVT prophylaxis - SQ heparin  cautious diurese as tolerated by renal function and hemodynamics  cardiac meds: toprol XL/ASA - would restart entresto if renal function remains stable - would observe off amiodarone given its potential for lung toxicity until evaluation by Dr. Irvin Bell of cardiology  recent ECHO -LVEF 30% - no significant valvular heart disease  repeat ECHO 4/26 -> moderate global left ventricular systolic dysfunction (LVEF 40%) - moderate global hypokinesis - moderate diastolic dysfunction (Stage II).  recent nuclear stress test -> negative for ischemia - fixed defects were felt to be related to an infiltrative process  GI prophylaxis - pepcid    Will follow with you. Plan of care discussed with the patient at bedside and nephrology. Discharge planning. Would defer an extensive evaluation of kidney disease to the outpatient setting    Johny Duval MD, [facility]  [phone]  Pulmonary Medicine     ASSESSMENT:    72 year old gentleman, lifelong non-smoker, followed by Dr. Chico Cabrales of our practice for obstructive sleep apnea (non-compliant with CPAP), asthma and sarcoidosis requiring intermittent month-long courses of steroids for "flares" (with presumed cardiac involvement). He has a history of HTN, HLD, DM, non-ischemic cardiomyopathy now with an LVEF ~ 30% on ECHO 4/14/2022 s/p AICD implantation and CKD. The patient was admitted to [facility] on 4/13 shortly after arriving home from [facility] with shortness of breath, fatigue, weakness and chest pain. He was diuresed for pulmonary edema complicated by IRWIN. Subsequent IV fluid hydration was complicated by worsening heart failure requiring NIV for respiratory support. He continued to have APCs, PVCs and NSVT on telemetry and was started on amiodarone. Cardiac perfusion scan was negative for ischemia - fixed defects were felt to be related to an infiltrative process. CT scan -> scattered perilymphatic nodules increased in size from a prior study -> started on steroids/mepron by rheumatology (felt not to be a candidate for methotrexate due to CKD or infliximab or humira due to CHF). He was discharged on 4/23 feeling "well" on amiodarone, toprol XL and ASA - entresto and diuretics were held due to IRWIN - prednisone/mepron - toujeo/humalog (no oral hypoglycemics) - sodium bicarbonate - fluid restriction - plans for outpatient cardiac MRI or PET scan. The patient comes to the [place] ER yesterday with shortness of breath with acute hypoxic respiratory failure requiring NIV therapy. He has a cough productive of scant sputum. He has chest congestion and wheeze. No fevers, chills or sweats. No chest pain/pressure or palpitations. No lower extremity swelling, PND or orthopnea. No loss of the sense of taste or smell. He has been found to have COVID-19 infection. His respiratory symptoms have improved following a brisk diuresis and he has now off BIPAP and on a nasal canula @ 6lpm    acute hypoxic respiratory failure -> multifactorial  1) COVID infection without evidence of COVID pneumonia  2) underlying pulmonary sarcoidosis without evidence of exacerbation  3) cardiac sarcoidosis with a non-ischemic cardiomyopathy - suspect pulmonary edema after discharge from [facility] off entresto and diuretics due to IRWIN  4) moderate persistent asthma with bronchospasm -> resolved - cardiac asthma versus asthma exacerbation    4/27 - unable to sleep due to chronic bedtime anxiety; feels well; no shortness of breath or hypoxemia on room air; no cough, sputum production, hemoptysis, chest congestion or wheeze; no fevers, chills or sweats; no chest pain/pressure or palpitations; good appetite; no fatigue, malaise or weakness; renal evaluation for CKD ongoing    PLAN/RECOMMENDATIONS:    stable oxygenation on room air  CT chest to better evaluate the lung parenchyma -> "to my eye" -> bilateral perihilar architectural lung distortion with volume loss with a few patchy peripheral bilateral lower lobe opacities are without change - calcified mediastinal lymph nodes - superimposed small right and trace left pleural effusion - mild scattered areas of groundglass opacities c/w pulmonary edema  albuterol/atrovent nebs q6h  pulmicort 0.5mg nebs q12h - give after duoneb - rinse mouth after use  baclofen for recurrent intractable hiccups  holding remdesivir due to renal dysfunction  decadron 6mg po daily x 10 days - glucose control on steroids (no indication for systemic steroids for sarcoidosis)  diligent DVT prophylaxis - SQ heparin  cardiology evaluation noted     cautious diurese as tolerated by renal function and hemodynamics     cardiac meds: toprol XL/ASA/lasix - would restart entresto if renal function remains stable - would observe off amiodarone given its potential for lung toxicity until evaluation by Dr. Irvin Bell of cardiology     recent ECHO -LVEF 30% - no significant valvular heart disease     repeat ECHO 4/26 -> moderate global left ventricular systolic dysfunction (LVEF 40%) - moderate global hypokinesis - moderate diastolic dysfunction (Stage II).     recent nuclear stress test -> negative for ischemia - fixed defects were felt to be related to an infiltrative process  GI prophylaxis - pepcid    Will follow with you. Plan of care discussed with the patient at bedside and nephrology. Discharge planning. Would defer an extensive evaluation of kidney disease to the outpatient setting    Johny Duval MD, [facility]  [phone]  Pulmonary Medicine

## 2022-04-27 NOTE — PROGRESS NOTE ADULT - SUBJECTIVE AND OBJECTIVE BOX
[facility]   [phone]  RENAL FOLLOW UP NOTE  --------------------------------------------------------------------------------  Chief Complaint:    24 hour events/subjective:    Patient seen and examined with Dr. Lang duran cp    PAST HISTORY  --------------------------------------------------------------------------------  No significant changes to PMH, PSH, FHx, SHx, unless otherwise noted    ALLERGIES & MEDICATIONS  --------------------------------------------------------------------------------  Allergies    penicillin (Faint)    Intolerances      Standing Inpatient Medications  albuterol/ipratropium for Nebulization 3 milliLiter(s) Nebulizer every 6 hours  aMIOdarone    Tablet 400 milliGRAM(s) Oral daily  aspirin enteric coated 81 milliGRAM(s) Oral daily  baclofen 10 milliGRAM(s) Oral three times a day  buDESOnide    Inhalation Suspension 0.5 milliGRAM(s) Inhalation every 12 hours  chlorhexidine 2% Cloths 1 Application(s) Topical <User Schedule>  dexAMETHasone     Tablet 6 milliGRAM(s) Oral daily  dextrose 5%. 1000 milliLiter(s) IV Continuous <Continuous>  dextrose 5%. 1000 milliLiter(s) IV Continuous <Continuous>  dextrose 50% Injectable 25 Gram(s) IV Push once  dextrose 50% Injectable 12.5 Gram(s) IV Push once  dextrose 50% Injectable 25 Gram(s) IV Push once  famotidine    Tablet 20 milliGRAM(s) Oral daily  furosemide   Injectable 40 milliGRAM(s) IV Push daily  glucagon  Injectable 1 milliGRAM(s) IntraMuscular once  heparin   Injectable 5000 Unit(s) SubCutaneous three times a day  insulin glargine Injectable (LANTUS) 25 Unit(s) SubCutaneous every morning  insulin lispro (ADMELOG) corrective regimen sliding scale   SubCutaneous three times a day before meals  insulin lispro (ADMELOG) corrective regimen sliding scale   SubCutaneous at bedtime  insulin lispro Injectable (ADMELOG) 10 Unit(s) SubCutaneous three times a day before meals  metoprolol succinate ER 75 milliGRAM(s) Oral daily  tamsulosin 0.8 milliGRAM(s) Oral at bedtime    PRN Inpatient Medications  dextrose Oral Gel 15 Gram(s) Oral once PRN      REVIEW OF SYSTEMS  --------------------------------------------------------------------------------    Gen: denies fevers/chills,  CVS: denies chest pain/palpitations  Resp: denies SOB/Cough  GI: Denies N/V/Abd pain  : Denies dysuria/oliguria/hematuria    VITALS/PHYSICAL EXAM  --------------------------------------------------------------------------------  T(C): 36.6 (04-27-22 @ 05:45), Max: 36.8 (04-27-22 @ 00:17)  HR: 74 (04-27-22 @ 05:45) (67 - 80)  BP: 117/70 (04-27-22 @ 05:45) (117/70 - 126/78)  RR: 19 (04-27-22 @ 10:51) (18 - 19)  SpO2: 97% (04-27-22 @ 10:51) (97% - 99%)  Wt(kg): --  Height (cm): 182.9 (04-26-22 @ 03:38)  Weight (kg): 81.4 (04-26-22 @ 03:38)  BMI (kg/m2): 24.3 (04-26-22 @ 03:38)  BSA (m2): 2.03 (04-26-22 @ 03:38)      04-26-22 @ 07:01  -  04-27-22 @ 07:00  --------------------------------------------------------  IN: 1270 mL / OUT: 2000 mL / NET: -730 mL    04-27-22 @ 07:01  -  04-27-22 @ 11:01  --------------------------------------------------------  IN: 0 mL / OUT: 250 mL / NET: -250 mL      Physical Exam:  	  	Gen: Appears comfortable, on O2  	Pulm: decrease bs  no rales or ronchi or wheezing  	CV: No JVD. RRR, S1S2  	Abd: +BS, soft, nontender/nondistended + hernia midline   	: No suprapubic tenderness  ? bladder distention   	UE: Warm, no cyanosis  no clubbing,  no edema;  	LE: Warm, no cyanosis  no clubbing, trace ankle edema    LABS/STUDIES  --------------------------------------------------------------------------------              9.9    6.90  >-----------<  232      [04-27-22 @ 06:16]              28.9     131  |  93  |  82  ----------------------------<  326      [04-27-22 @ 06:16]  4.0   |  23  |  3.24        Ca     10.0     [04-27-22 @ 06:16]      Mg     2.1     [04-27-22 @ 06:16]      Phos  4.5     [04-27-22 @ 06:16]    TPro  6.4  /  Alb  x   /  TBili  x   /  DBili  x   /  AST  x   /  ALT  x   /  AlkPhos  x   [04-27-22 @ 09:14]    PT/INR: PT 12.3 , INR 1.06       [04-26-22 @ 06:29]  PTT: 23.9       [04-26-22 @ 06:29]    CK 32      [04-26-22 @ 06:28]        [04-26-22 @ 06:28]    Creatinine Trend:  SCr 3.24 [04-27 @ 06:16]  SCr 3.12 [04-26 @ 21:39]  SCr 3.03 [04-26 @ 13:12]  SCr 2.99 [04-26 @ 06:28]  SCr 2.97 [04-26 @ 00:16]              Urinalysis - [04-25-22 @ 22:37]      Color Light Yellow / Appearance Clear / SG 1.018 / pH 6.5      Gluc 1000 mg/dL / Ketone Negative  / Bili Negative / Urobili Negative       Blood Negative / Protein Negative / Leuk Est Negative / Nitrite Negative      RBC  / WBC  / Hyaline  / Gran  / Sq Epi  / Non Sq Epi  / Bacteria       Ferritin 237      [04-26-22 @ 08:53]  PTH -- (Ca 10.7)      [04-27-22 @ 01:48]   12  PTH -- (Ca 11.1)      [04-26-22 @ 19:48]   7  Vitamin D (25OH) 28.9      [04-26-22 @ 19:48]  TSH 0.62      [04-26-22 @ 08:53]

## 2022-04-27 NOTE — CONSULT NOTE ADULT - SUBJECTIVE AND OBJECTIVE BOX
HPI:  72M c hx HTN, pulmonary sarcoidosis, asthma, CHF (?reported EF 45%) s/p AICD, DM2, ?CKD, "extra heart beat" on amiodarone, intermittent intractable hiccups, recent hospitalization at [facility] (reportedly hospitalized for 12 days, d/c'd 4 days ago) for ?CP and CHF exacerbation, pw SOB and CP.    At discharge, pt states he was breathing well and feeling good. pt states his course at [facility] was a nuc pharm stress test which was reportedly normal. Pt reportedly diuresed with lasix. Pt also started on prednisone 30 for poss sarcoidosis. Pt is not sure why he was started on amiodarone, but states he was told he had an "extra heart beat". Pt also says his Cr was 3.5 while at [facility]. Pt states he did not have covid while at [facility].  Yesterday morning, awoke with mild SOB, worse with lying flat, improved with standing up. After eating dinner and one hour prior to arrival to ED, pt reports acute worsening of his SOB, chest tightness, coughing. Pt reports good compliance with his home meds. Pt states he does not eat any extra salt. At baseline very functional, completes his own IADLs, drives. Denies fevers, chills. Pt denies any relieving or exacerbating factors.  (26 Apr 2022 04:39)    Endocrine consulted for steroid-induced hyperglycemia.    DM diagnosis: DM2 diagnosed 20 years ago  Last A1c: 7.6  Endocrinologist: Dr. Carvajal, [facility]  Home DM meds: Toujeo 30 units qam, Januvia 50mg daily, Glipizide 2mg BID  Microvascular complications: + CKD, no retinopathy, + neuropathy  Macrovascular complications: No CVA, MI, PAD  SMBG: Fasting BG , rare lows  Symptoms: No symptoms of hyperglycemia  Diet at home: Eats breakfast, an afternoon snack and dinner. Does not portion carbs.  Appetite in the hospital: Finishing meals but feeling nauseated for past 3 hours and not interested in dinner at this time.  PMHx: As above. No known hx of pancreatitis or UTIs. Has HF.      PAST MEDICAL & SURGICAL HISTORY:  Asthma    Sarcoidosis    Ventricular dysfunction of heart    Diabetes mellitus type 2 in nonobese    BPH (benign prostatic hyperplasia)    AICD (automatic cardioverter/defibrillator) present        FAMILY HISTORY:  Family history of acute congestive heart failure (Father)  Father    Family history of chronic renal failure syndrome (Father)  Father    Family history of heart disease (Mother)  Mother    No known FHx of thyroid cancer.    Dad with DM.    Social History: No tobacco use. Rare alcohol use.    Outpatient Medications: Home Medications:  Advair Diskus 500 mcg-50 mcg inhalation powder: 1 puff(s) inhaled 2 times a day (26 Apr 2022 15:25)  amiodarone 400 mg oral tablet: 1 tab(s) orally once a day (26 Apr 2022 15:25)  Aspirin Enteric Coated 81 mg oral delayed release tablet: 1 tab(s) orally once a day (26 Apr 2022 15:25)  HumaLOG KwikPen 100 units/mL injectable solution: 10 unit(s) injectable 3 times a day (before meals) (26 Apr 2022 15:25)  Pepcid 20 mg oral tablet: 1 tab(s) orally 2 times a day (26 Apr 2022 15:25)  predniSONE 10 mg oral tablet: 3 tab(s) orally once a day (26 Apr 2022 15:25)  sodium bicarbonate 650 mg oral tablet: 2 tab(s) orally 2 times a day for 15 days (start 4/23/22) (26 Apr 2022 15:25)  tamsulosin 0.4 mg oral capsule: 2 cap(s) orally once a day (26 Apr 2022 15:25)  Toprol-XL 50 mg oral tablet, extended release: 1.5 tab(s) orally once a day (26 Apr 2022 15:25)  Toujeo SoloStar 300 units/mL subcutaneous solution: 18 unit(s) subcutaneous once a day (at bedtime) (26 Apr 2022 15:25)  Vitamin D3 25 mcg (1000 intl units) oral tablet: 1 tab(s) orally once a day (26 Apr 2022 15:25)      MEDICATIONS  (STANDING):  albuterol/ipratropium for Nebulization 3 milliLiter(s) Nebulizer every 6 hours  aspirin enteric coated 81 milliGRAM(s) Oral daily  baclofen 10 milliGRAM(s) Oral three times a day  buDESOnide    Inhalation Suspension 0.5 milliGRAM(s) Inhalation every 12 hours  chlorhexidine 2% Cloths 1 Application(s) Topical <User Schedule>  clonazePAM  Tablet 1 milliGRAM(s) Oral <User Schedule>  dexAMETHasone     Tablet 6 milliGRAM(s) Oral daily  dextrose 5%. 1000 milliLiter(s) (50 mL/Hr) IV Continuous <Continuous>  dextrose 5%. 1000 milliLiter(s) (100 mL/Hr) IV Continuous <Continuous>  dextrose 50% Injectable 25 Gram(s) IV Push once  dextrose 50% Injectable 12.5 Gram(s) IV Push once  dextrose 50% Injectable 25 Gram(s) IV Push once  famotidine    Tablet 20 milliGRAM(s) Oral daily  glucagon  Injectable 1 milliGRAM(s) IntraMuscular once  heparin   Injectable 5000 Unit(s) SubCutaneous three times a day  insulin glargine Injectable (LANTUS) 25 Unit(s) SubCutaneous every morning  insulin lispro (ADMELOG) corrective regimen sliding scale   SubCutaneous three times a day before meals  insulin lispro (ADMELOG) corrective regimen sliding scale   SubCutaneous at bedtime  insulin lispro Injectable (ADMELOG) 10 Unit(s) SubCutaneous three times a day before meals  metoprolol succinate ER 75 milliGRAM(s) Oral daily  tamsulosin 0.8 milliGRAM(s) Oral at bedtime    MEDICATIONS  (PRN):  dextrose Oral Gel 15 Gram(s) Oral once PRN Blood Glucose LESS THAN 70 milliGRAM(s)/deciliter      Allergies    penicillin (Faint)    Intolerances      Review of Systems:  Constitutional:  No fever, No chills.  Eye:  No eye pain, No blurring.   Ear/Nose/Mouth/Throat:  No nasal congestion, No sore throat.   Respiratory:  No shortness of breath, No cough, No sputum production, No hemoptysis.   Cardiovascular:  No chest pain, No palpitations.   Gastrointestinal:  + nausea, No vomiting, No diarrhea.   Genitourinary:  No dysuria, No hematuria.   Endocrine:  No excessive thirst, No polyuria.   Musculoskeletal:  No back pain, No decreased range of motion.   Integumentary:  No rash, No skin lesion.   Neurologic:  Alert and oriented X4, No confusion, + numbness.   Additional ROS reviewed and negative except as indicated in HPI.        PHYSICAL EXAM:  VITALS: T(C): 36.2 (04-27-22 @ 11:40)  T(F): 97.2 (04-27-22 @ 11:40), Max: 98.2 (04-27-22 @ 00:17)  HR: 64 (04-27-22 @ 11:40) (64 - 75)  BP: 128/80 (04-27-22 @ 11:40) (117/70 - 128/80)  RR:  (18 - 19)  SpO2:  (97% - 99%)  Wt(kg): --  General: Well-developed male, No acute distress, Speaking full sentences.   Eye:  Extraocular movements are intact, No proptosis or lid lag.   HENT:  Normocephalic.   Neck:  Supple, Non-tender.   Respiratory:  Respirations are non-labored, Symmetric chest wall expansion, Breath sounds are equal.   Cardiovascular:  Normal rate, irregular rhythm, trace pitting edema b/l LE.  Gastrointestinal:  Soft, Non-tender, Non-distended.   Musculoskeletal:  Normal range of motion, No gross joint swelling.   Feet:  Normal by visual exam, Normal pulses, No ulcers.   Integumentary:  Warm, dry.  Mental Status Exam:  Orientedx4, Speech clear and coherent.   Neurologic:  Alert, Orientedx4, Normal motor function, No focal deficits, Cranial Nerves II-XII are grossly intact bilaterally.   Psychiatric:  Cooperative, Appropriate mood & affect.    POCT Blood Glucose.: 351 mg/dL (04-27-22 @ 16:59)  POCT Blood Glucose.: 417 mg/dL (04-27-22 @ 16:57)  POCT Blood Glucose.: 376 mg/dL (04-27-22 @ 12:18)  POCT Blood Glucose.: 377 mg/dL (04-27-22 @ 08:20)  POCT Blood Glucose.: 379 mg/dL (04-26-22 @ 21:53)  POCT Blood Glucose.: 483 mg/dL (04-26-22 @ 17:13)  POCT Blood Glucose.: 464 mg/dL (04-26-22 @ 17:11)  POCT Blood Glucose.: 495 mg/dL (04-26-22 @ 12:25)  POCT Blood Glucose.: 536 mg/dL (04-26-22 @ 08:16)  POCT Blood Glucose.: 570 mg/dL (04-26-22 @ 08:14)  POCT Blood Glucose.: 549 mg/dL (04-26-22 @ 06:41)  POCT Blood Glucose.: 473 mg/dL (04-26-22 @ 06:39)  POCT Blood Glucose.: 407 mg/dL (04-25-22 @ 23:35)                            9.9    6.90  )-----------( 232      ( 27 Apr 2022 06:16 )             28.9       04-27    131<L>  |  93<L>  |  82<H>  ----------------------------<  326<H>  4.0   |  23  |  3.24<H>    EGFR if : x   EGFR if non : x     Ca    10.0      04-27  Mg     2.1     04-27  Phos  4.5     04-27    TPro  6.4  /  Alb  x   /  TBili  x   /  DBili  x   /  AST  x   /  ALT  x   /  AlkPhos  x   04-27      Thyroid Function Tests:  04-26 @ 08:53 TSH 0.62 FreeT4 -- T3 -- Anti TPO -- Anti Thyroglobulin Ab -- TSI --              Radiology:

## 2022-04-27 NOTE — CHART NOTE - NSCHARTNOTEFT_GEN_A_CORE
Asked to eval for nausea/dizziness. pt states nausea started after lunch. mild epigastric pain. dizziness on turning head to sides and more so when sitting and standing. Denies vertigo/vomiting      Vital Signs Last 24 Hrs  T(C): 36.2 (27 Apr 2022 11:40), Max: 36.8 (27 Apr 2022 00:17)  T(F): 97.2 (27 Apr 2022 11:40), Max: 98.2 (27 Apr 2022 00:17)  HR: 70 (27 Apr 2022 18:10) (64 - 75)  BP: 132/80 (27 Apr 2022 18:10) (117/70 - 132/80)  BP(mean): --  RR: 19 (27 Apr 2022 18:10) (18 - 19)  SpO2: 96% (27 Apr 2022 18:10) (96% - 99%)    Physical Exam:  General: WN/WD NAD  Neurology: A&Ox3, nonfocal, ALTMAN x 4  Head:  Normocephalic, atraumatic  Respiratory: CTA B/L  CV: RRR, S1S2, no murmur  Abdominal: Soft, NT, ND no palpable mass  MSK: No edema, + peripheral pulses, FROM all 4 extremity  Labs:                          9.9    6.90  )-----------( 232      ( 27 Apr 2022 06:16 )             28.9     04-27    131<L>  |  93<L>  |  82<H>  ----------------------------<  326<H>  4.0   |  23  |  3.24<H>    Ca    10.0      27 Apr 2022 06:16  Phos  4.5     04-27  Mg     2.1     04-27    TPro  6.4  /  Alb  x   /  TBili  x   /  DBili  x   /  AST  x   /  ALT  x   /  AlkPhos  x   04-27    CARDIAC MARKERS ( 26 Apr 2022 06:28 )  x     / x     / 32 U/L / x     / x      CARDIAC MARKERS ( 25 Apr 2022 21:56 )  x     / x     / 62 U/L / x     / 5.8 ng/mL    Radiology:    Assessment & Plan:  72M c hx HTN, pulmonary sarcoidosis, asthma, CHF (?reported EF 45%) s/p AICD, DM2, ?CKD, "extra heart beat" on amiodarone, intermittent intractable hiccups, recent hospitalization at [facility] (reportedly hospitalized for 12 days, d/c'd 4 days ago) for ?CP and CHF exacerbation, pw SOB and CP. Admitted with hypoxic resp failure in the setting of COVID and decompensated heart failure . Currently on RA.  Hosp course complicated with hyperglycemia on Decadron, being managed by endocrine. Now with nausea/dizziness/epigastric pain  likely due to DKA Vs S/E of Baclofen Vs dehydration    > BMP with betahydroxybutyrate  > PPI daily  > Zofran PRN  > Hold baclofen for now   > discussed with Attending    April Gomez ANP-BC  #[id number] Asked to eval for nausea/dizziness. pt states nausea started after lunch. mild epigastric pain. dizziness on turning head to sides and more so when sitting and standing. Denies vertigo/vomiting      Vital Signs Last 24 Hrs  T(C): 36.2 (27 Apr 2022 11:40), Max: 36.8 (27 Apr 2022 00:17)  T(F): 97.2 (27 Apr 2022 11:40), Max: 98.2 (27 Apr 2022 00:17)  HR: 70 (27 Apr 2022 18:10) (64 - 75)  BP: 132/80 (27 Apr 2022 18:10) (117/70 - 132/80)  BP(mean): --  RR: 19 (27 Apr 2022 18:10) (18 - 19)  SpO2: 96% (27 Apr 2022 18:10) (96% - 99%)    Physical Exam:  General: WN/WD NAD  Neurology: A&Ox3, nonfocal, ALTMAN x 4  Head:  Normocephalic, atraumatic  Respiratory: CTA B/L  CV: RRR, S1S2, no murmur  Abdominal: Soft, NT, ND no palpable mass  MSK: No edema, + peripheral pulses, FROM all 4 extremity  Labs:                          9.9    6.90  )-----------( 232      ( 27 Apr 2022 06:16 )             28.9     04-27    131<L>  |  93<L>  |  82<H>  ----------------------------<  326<H>  4.0   |  23  |  3.24<H>    Ca    10.0      27 Apr 2022 06:16  Phos  4.5     04-27  Mg     2.1     04-27    TPro  6.4  /  Alb  x   /  TBili  x   /  DBili  x   /  AST  x   /  ALT  x   /  AlkPhos  x   04-27    CARDIAC MARKERS ( 26 Apr 2022 06:28 )  x     / x     / 32 U/L / x     / x      CARDIAC MARKERS ( 25 Apr 2022 21:56 )  x     / x     / 62 U/L / x     / 5.8 ng/mL    Radiology:    Assessment & Plan:  72M c hx HTN, pulmonary sarcoidosis, asthma, CHF (?reported EF 45%) s/p AICD, DM2, ?CKD, "extra heart beat" on amiodarone, intermittent intractable hiccups, recent hospitalization at [facility] (reportedly hospitalized for 12 days, d/c'd 4 days ago) for ?CP and CHF exacerbation, pw SOB and CP. Admitted with hypoxic resp failure in the setting of COVID and decompensated heart failure . Currently on RA.  Hosp course complicated with hyperglycemia on Decadron, being managed by endocrine. Now with nausea/dizziness/epigastric pain  likely due to DKA Vs S/E of Baclofen Vs dehydration    > BMP with betahydroxybutyrate  > Check orthostatics  > PPI daily  > Zofran PRN  > Hold baclofen for now   > discussed with Attending    April Gomez ANP-BC  #[id number]

## 2022-04-27 NOTE — PROGRESS NOTE ADULT - SUBJECTIVE AND OBJECTIVE BOX
DATE OF SERVICE: 04-27-22 @ 10:58    Patient is a 72y old  Male who presents with a chief complaint of sob, chest pain (26 Apr 2022 20:16)      INTERVAL HISTORY: Feels ok.     REVIEW OF SYSTEMS:  CONSTITUTIONAL: No weakness  EYES/ENT: No visual changes;  No throat pain   NECK: No pain or stiffness  RESPIRATORY: No cough, wheezing; No shortness of breath  CARDIOVASCULAR: No chest pain or palpitations  GASTROINTESTINAL: No abdominal  pain. No nausea, vomiting, or hematemesis  GENITOURINARY: No dysuria, frequency or hematuria  NEUROLOGICAL: No stroke like symptoms  SKIN: No rashes    TELEMETRY Personally reviewed: A-Paced 80-90s  	  MEDICATIONS:  aMIOdarone    Tablet 400 milliGRAM(s) Oral daily  furosemide   Injectable 40 milliGRAM(s) IV Push daily  metoprolol succinate ER 75 milliGRAM(s) Oral daily  tamsulosin 0.8 milliGRAM(s) Oral at bedtime        PHYSICAL EXAM:  T(C): 36.6 (04-27-22 @ 05:45), Max: 36.8 (04-27-22 @ 00:17)  HR: 74 (04-27-22 @ 05:45) (67 - 80)  BP: 117/70 (04-27-22 @ 05:45) (117/70 - 126/78)  RR: 19 (04-27-22 @ 10:51) (18 - 19)  SpO2: 97% (04-27-22 @ 10:51) (97% - 99%)  Wt(kg): --  I&O's Summary    26 Apr 2022 07:01  -  27 Apr 2022 07:00  --------------------------------------------------------  IN: 1270 mL / OUT: 2000 mL / NET: -730 mL    27 Apr 2022 07:01  -  27 Apr 2022 10:58  --------------------------------------------------------  IN: 0 mL / OUT: 250 mL / NET: -250 mL          Appearance: In no distress	  HEENT:    PERRL, EOMI	  Cardiovascular:  S1 S2, No JVD  Respiratory: Lungs clear to auscultation	  Gastrointestinal:  Soft, Non-tender, + BS	  Vascularature:  No edema of LE  Psychiatric: Appropriate affect   Neuro: no acute focal deficits                               9.9    6.90  )-----------( 232      ( 27 Apr 2022 06:16 )             28.9     04-27    131<L>  |  93<L>  |  82<H>  ----------------------------<  326<H>  4.0   |  23  |  3.24<H>    Ca    10.0      27 Apr 2022 06:16  Phos  4.5     04-27  Mg     2.1     04-27    TPro  6.4  /  Alb  x   /  TBili  x   /  DBili  x   /  AST  x   /  ALT  x   /  AlkPhos  x   04-27        Labs personally reviewed      ASSESSMENT/PLAN: 	    72M c hx HTN, pulmonary sarcoidosis, asthma, CHF (?reported EF 45%) s/p AICD, DM2, ?CKD, "extra heart beat" on amiodarone, intermittent intractable hiccups, recent hospitalization at [facility] (reportedly hospitalized for 12 days, d/c'd 4 days ago) for ?CP and CHF exacerbation, pw acute hypoxic respiratory failure, sepsis, IRWIN, 2/2 covid and poss CHF exacerbation, c/b steroid induced hyperglycemia    Problem/Plan - 1:  ·  Problem: Acute systolic HF  ·  Plan: HF with superimposed COVID  - much improved with diuresis  - BNP at discharge at [facility] 1900, now >11.6K  - Continue with Lasix IV daily and transition to PO in next 48 hours    Problem/Plan - 2:  ·  Problem: Pneumonia due to COVID-19 virus.   ·  Plan: - Decadron 6 qd as per primary team  - unable to start remdesivir 2/2 renal failure  - id consult pending    Problem/Plan - 3:  - PVCs/?NSVT at [facility] and was started on Amio at the time  - discussed with OP cardio Stanislav Bell and Fanny, will hold given risk for lung toxicity    Problem/Plan - 4:  ·  Problem: Chest pain.   ·  Plan: Recent Stress test at [facility] with small inferior wall infarct  - likely 2/2 HF   - EF here on echo actually improved compared to echo at [facility]    Problem/Plan - 5:  ·  Problem: Sarcoidosis.   ·  Plan:   - cont steroids     KAREY Bernard-CARMELLA Blackburn DO [facility]  Cardiovascular Medicine  [address] [place] [address], [address]  Office: [phone]  Cell: [phone]

## 2022-04-27 NOTE — CONSULT NOTE ADULT - ATTENDING COMMENTS
Agree with fellow's note above. Will need adjustment of basal/bolus doses. Would discontinue sulfonylurea on discharge given renal dysfunction.       Vera Cole DO

## 2022-04-27 NOTE — PROGRESS NOTE ADULT - ASSESSMENT
72M c hx HTN, pulmonary sarcoidosis, asthma, CHF (?reported EF 45%) s/p AICD, DM2, ?CKD, "extra heart beat" on amiodarone, intermittent intractable hiccups, recent hospitalization at [facility] (reportedly hospitalized for 12 days, d/c'd 4 days ago) for ?CP and CHF exacerbation, pw SOB and CP. pt with IRWIN and chf along with hypercalcemia    1- IRWIN suspected  2- chf  3- covid 19   4- HTN   5- sarcoidosis   6- hypercalcemia  7- DM       irwin vs ckd in this pt.   unlikely diabetic nephropathy given no proteinuria.   hypercalcemia can cause irwin.  to have renal sono when able   transition lasix 40 mg po daily  calcium improving   spep/ipep/bence henning pending  check c3/c4  strict I/O  remdesivir and decadron for covid 19   insulin to control hyperglycemia   strict I/O  trend creatinine and electrolytes daily 72M c hx HTN, pulmonary sarcoidosis, asthma, CHF (?reported EF 45%) s/p AICD, DM2, ?CKD, "extra heart beat" on amiodarone, intermittent intractable hiccups, recent hospitalization at [facility] (reportedly hospitalized for 12 days, d/c'd 4 days ago) for ?CP and CHF exacerbation, pw SOB and CP. pt with IRWIN and chf along with hypercalcemia    1- IRWIN suspected  2- chf  3- covid 19   4- HTN   5- sarcoidosis   6- hypercalcemia  7- DM       irwin vs ckd in this pt.   unlikely diabetic nephropathy given noalbuminuria   hypercalcemia can cause irwin.  to have renal sono when able   transition to  lasix 40 mg po daily  calcium improving check alb    spep/ipep/bence jones pending  check c3/c4  strict I/O  remdesivir and decadron for covid 19   insulin to control hyperglycemia   strict I/O  trend creatinine and electrolytes daily

## 2022-04-27 NOTE — PROGRESS NOTE ADULT - SUBJECTIVE AND OBJECTIVE BOX
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; pulmonary edema; pleural effusions; restrictive lung disease; obstructive sleep apnea; dyspnea; cough; hiccups    INTERVAL HISTORY: unable to sleep due to chronic bedtime anxiety; feels well; no shortness of breath or hypoxemia on room air; no cough, sputum production, hemoptysis, chest congestion or wheeze; no fevers, chills or sweats; no chest pain/pressure or palpitations; good appetite; no fatigue, malaise or weakness; renal evaluation for CKD ongoing    REVIEW OF SYSTEMS:  Constitutional: As per interval history  HEENT: Within normal limits  CV: As per interval history  Resp: As per interval history  GI: Within normal limits   : CKD  Musculoskeletal: Within normal limits  Skin: Within normal limits  Neurological: Within normal limits  Psychiatric: Within normal limits  Endocrine: Within normal limits  Hematologic/Lymphatic: Within normal limits  Allergic/Immunologic: Within normal limits    MEDICATIONS:     Pulmonary "  albuterol/ipratropium for Nebulization 3 milliLiter(s) Nebulizer every 6 hours  buDESOnide    Inhalation Suspension 0.5 milliGRAM(s) Inhalation every 12 hours    Anti-microbials:    Cardiovascular:  aMIOdarone    Tablet 400 milliGRAM(s) Oral daily  metoprolol succinate ER 75 milliGRAM(s) Oral daily  tamsulosin 0.8 milliGRAM(s) Oral at bedtime    Other:  aspirin enteric coated 81 milliGRAM(s) Oral daily  baclofen 10 milliGRAM(s) Oral three times a day  chlorhexidine 2% Cloths 1 Application(s) Topical <User Schedule>  clonazePAM  Tablet 1 milliGRAM(s) Oral <User Schedule>  dexAMETHasone     Tablet 6 milliGRAM(s) Oral daily  dextrose 5%. 1000 milliLiter(s) IV Continuous <Continuous>  dextrose 5%. 1000 milliLiter(s) IV Continuous <Continuous>  dextrose 50% Injectable 25 Gram(s) IV Push once  dextrose 50% Injectable 12.5 Gram(s) IV Push once  dextrose 50% Injectable 25 Gram(s) IV Push once  famotidine    Tablet 20 milliGRAM(s) Oral daily  glucagon  Injectable 1 milliGRAM(s) IntraMuscular once  heparin   Injectable 5000 Unit(s) SubCutaneous three times a day  insulin glargine Injectable (LANTUS) 25 Unit(s) SubCutaneous every morning  insulin lispro (ADMELOG) corrective regimen sliding scale   SubCutaneous three times a day before meals  insulin lispro (ADMELOG) corrective regimen sliding scale   SubCutaneous at bedtime  insulin lispro Injectable (ADMELOG) 10 Unit(s) SubCutaneous three times a day before meals    MEDICATIONS  (PRN):  dextrose Oral Gel 15 Gram(s) Oral once PRN Blood Glucose LESS THAN 70 milliGRAM(s)/deciliter    OBJECTIVE:    Daily Weight in k.7 (2022 08:00)    POCT Blood Glucose.: 376 mg/dL (2022 12:18)  POCT Blood Glucose.: 377 mg/dL (2022 08:20)  POCT Blood Glucose.: 379 mg/dL (2022 21:53)  POCT Blood Glucose.: 483 mg/dL (2022 17:13)  POCT Blood Glucose.: 464 mg/dL (2022 17:11)  POCT Blood Glucose.: 495 mg/dL (2022 12:25)      PHYSICAL EXAM:       ICU Vital Signs Last 24 Hrs  T(C): 36.2 (2022 11:40), Max: 36.8 (2022 00:17)  T(F): 97.2 (2022 11:40), Max: 98.2 (2022 00:17)  HR: 64 (2022 11:40) (64 - 80)  BP: 128/80 (2022 11:40) (117/70 - 128/80)  BP(mean): --  ABP: --  ABP(mean): --  RR: 18 (2022 11:40) (18 - 19)  SpO2: 98% (2022 11:40) (97% - 99%) on room air     General: Awake. Alert. Cooperative. No distress. Appears stated age 	  HEENT:  Atraumatic. Normocephalic. Anicteric. Normal oral mucosa. PERRL. EOMI.  Neck: Supple. Trachea midline. Thyroid without enlargement/tenderness/nodules. No carotid bruit. No JVD.	  Cardiovascular: Regular rate and rhythm. S1 S2 normal. No murmurs, rubs or gallops.  Respiratory: Respirations unlabored. Clear to auscultation and percussion bilaterally. No wheeze. No rales. No curvature.  Abdomen: Soft. Non-tender. Non-distended. No organomegaly. No masses. Normal bowel sounds.  Extremities: Warm to touch. No clubbing or cyanosis. No pedal edema.  Pulses: 2+ peripheral pulses all extremities.	  Skin: Normal skin color. No rashes or lesions. No ecchymoses. No cyanosis. Warm to touch.  Lymph Nodes: Cervical, supraclavicular and axillary nodes normal  Neurological: Motor and sensory examination equal and normal. A and O x 3  Psychiatry: Appropriate mood and affect.    LABS:                          9.9    6.90  )-----------( 232      ( 2022 06:16 )             28.9     CBC    WBC  6.90 <==, 11.23 <==, 15.90 <==    Hemoglobin  9.9 <<==, 11.1 <<==, 13.0 <<==    Hematocrit  28.9 <==, 32.7 <==, 38.1 <==    Platelets  232 <==, 250 <==, 370 <==      131<L>  |  93<L>  |  82<H>  ----------------------------<  326<H>    04-27  4.0   |  23  |  3.24<H>      LYTES    sodium  131 <==, 128 <==, 131 <==, 131 <==, 132 <==, 132 <==    potassium   4.0 <==, 4.6 <==, 4.8 <==, 5.2 <==, 4.9 <==, 4.6 <==    chloride  93 <==, 91 <==, 92 <==, 95 <==, 94 <==, 92 <==    carbon dioxide  23 <==, 20 <==, 21 <==, 19 <==, 21 <==, 21 <==    =============================================================================================  RENAL FUNCTION:    Creatinine:   3.24  <<==, 3.12  <<==, 3.03  <<==, 2.99  <<==, 2.97  <<==, 3.06  <<==    BUN:   82 <==, 78 <==, 68 <==, 67 <==, 61 <==, 60 <==    ============================================================================================    calcium   10.0 <==, 10.6 <==, 10.8 <==, 10.3 <==, 10.8 <==, 11.3 <==    phos   4.5 <==, 3.7 <==, 3.6 <==    mag   2.1 <==, 2.0 <==, 2.0 <==    ============================================================================================  LFTs    AST:   14 <== , 19 <== , 27 <== , 29 <==     ALT:  58  <== , 78  <== , 91  <== , 97  <==     AP:  90  <=, 109  <=, 123  <=, 141  <=    Bili:  0.4  <=, 0.5  <=, 0.4  <=, 0.4  <=    PT/INR - ( 2022 06:29 )   PT: 12.3 sec;   INR: 1.06 ratio       PTT - ( 2022 06:29 )  PTT:23.9 sec    Venous Blood Gas:   @ 00:16  7.41/39/47/25/82.4  VBG Lactate: 2.1    Venous Blood Gas:   @ 21:56  7.34/49/22/26/30.8  VBG Lactate: 2.9    Procalcitonin, Serum: 0.10 ng/mL ( @ 21:56)    Serum Pro-Brain Natriuretic Peptide: 71544 pg/mL ( @ 06:28)  Serum Pro-Brain Natriuretic Peptide: 7795 pg/mL ( @ 21:56)    CARDIAC MARKERS ( 2022 06:28 )  CPK 32 U/L /CKMB x     /CKMB Units x        troponin 76 ng/L    CARDIAC MARKERS ( 2022 21:56 )  CPK 62 U/L /CKMB x     /CKMB Units 5.8 ng/mL    troponin 56 ng/L    < from: Transthoracic Echocardiogram (22 @ 15:12) >    Patient name: LORNA, ANTE  YOB: 1950   Age: 72 (M)   MR#: [id number]  Study Date: 2022  Location: O/PSonographer: Vladimir Saleh RDCS  Study quality: Technically difficult  Referring Physician: Suhas Peters MD  Blood Pressure: 122/74 mmHg  Height: 183 cm  Weight: 81 kg  BSA: 2 m2  ------------------------------------------------------------------------  PROCEDURE: Transthoracic echocardiogram with 2-D, M-Mode  and complete spectral and color flow Doppler.  INDICATION: Heart failure, unspecified (I50.9)  ------------------------------------------------------------------------  Dimensions:    Normal Values:  LA:     4.0    2.0 - 4.0 cm  Ao:     3.3    2.0 - 3.8 cm  SEPTUM: 1.3    0.6 - 1.2 cm  PWT:    1.2    0.6 - 1.1 cm  LVIDd:  5.1    3.0 - 5.6 cm  LVIDs:  3.5    1.8 - 4.0 cm  Derived variables:  LVMI: 126 g/m2  RWT: 0.47  Fractional short: 31 %  EF (Tom Rule): 40 %Doppler Peak Velocity (m/sec):  AoV=1.3  ------------------------------------------------------------------------  Observations:  Mitral Valve: Normal mitral valve. Mild mitral  regurgitation.  Aortic Valve/Aorta: Normal trileaflet aortic valve. Peak  transaortic valve gradient equals 7 mm Hg. No aortic valve  regurgitation seen. Peak left ventricular outflow tract  gradient equals 3 mm Hg, mean gradient is equal to 2 mm Hg,  LVOT velocity time integral equals 18 cm.  Normal aortic root size. (Ao: 3.3 cm at the sinuses of  Valsalva).  Left Atrium: Normal left atrium.  LA volume index = 31  cc/m2.  Left Ventricle: Moderate global left ventricular systolic  dysfunction. LVEF calculated is 40%. Moderate global  hypokinesis. Normal left ventricular internal dimensions  and wall thicknesses. Moderate diastolic dysfunction (Stage  II).  Right Heart: Normalright atrium. Normal right ventricular  size and function. A device wire is noted in the right  heart. Normal tricuspid valve. Mild tricuspid  regurgitation. Normal pulmonic valve. Mild pulmonic  regurgitation.  Pericardium/Pleura: Small pericardial effusion.  Hemodynamic: Estimated right ventricular systolic pressure  equals 41 mm Hg, assuming right atrial pressure equals 3 mm  Hg, consistent with mild pulmonary hypertension. Color  Doppler demonstrates no evidence of a patent foramen ovale.  ------------------------------------------------------------------------  Conclusions:  1. Normal mitral valve.Mild mitral regurgitation.  2. Normal left atrium.  LA volume index = 31 cc/m2.  3. Moderate global left ventricular systolic dysfunction.  LVEF calculated is 40%. Moderate global hypokinesis.  Moderate diastolic dysfunction (Stage II).  4. Normal right atrium.Normal right ventricular size and  function. A device wire is noted in the right heart.Normal  tricuspid valve. Mild tricuspid regurgitation.Estimated  pulmonary artery systolic pressure equals 41  mm Hg,  assuming right atrial pressure equals 3 mm Hg, consistent  with mild pulmonary pressures.  *** Compared with echocardiogram of 3/15/2017, no  significant changes noted.  ------------------------------------------------------------------------  Confirmed on  2022 - 17:33:01 by Megan Resendiz M.D.  ------------------------------------------------------------------------  ---------------------------------------------------------------------------------------------------------------    MICROBIOLOGY:   Respiratory Viral Panel with COVID-19 by LAURA (22 @ 21:55)   Rapid RVP Result: Detected   SARS-CoV-2: Detected   This Respiratory Panel uses polymerase chain reaction (PCR) to detect for   adenovirus; coronavirus (HKU1, NL63, 229E, OC43); human metapneumovirus   (hMPV); human enterovirus/rhinovirus (Entero/RV); influenza A; influenza   A/H1; influenza A/H3; influenza A/H1-2009; influenza B; parainfluenza   viruses 1, 2, 3, 4; respiratory syncytial virus; Mycoplasma pneumoniae;   Chlamydophila pneumoniae; and SARS-CoV-2.     Urinalysis Basic - ( 2022 22:37 )    Color: Light Yellow / Appearance: Clear / S.018 / pH: x  Gluc: x / Ketone: Negative  / Bili: Negative / Urobili: Negative   Blood: x / Protein: Negative / Nitrite: Negative   Leuk Esterase: Negative / RBC: x / WBC x   Sq Epi: x / Non Sq Epi: x / Bacteria: x    Culture - Urine (22 @ 02:01)   Specimen Source: Clean Catch Clean Catch (Midstream)   Culture Results:   <10,000 CFU/mL Normal Urogenital Jyoti     Culture - Blood (22 @ 08:51)   Specimen Source: .Blood Blood-Venous   Culture Results:   No growth to date.     Culture - Blood (22 @ 08:51)   Specimen Source: .Blood Blood-Peripheral   Culture Results:   No growth to date.   RADIOLOGY:  [x ] Chest radiographs reviewed and interpreted by me    EXAM:  CT CHEST                          PROCEDURE DATE:  2022      FINDINGS:    AIRWAYS, LUNGS, PLEURA: Bilateral perihilar architectural lung distortion   with volume loss and few patchy peripheral bilateral lower lobe opacities   were present on 2/3/2021. Central left upper lobe consolidation and right   apical ground-glass opacities decreased compared to 2/3/2021.    Small bilateral pleural effusions, new on the right and unchanged on the   left.    MEDIASTINUM: Dilated left ventricle. No pericardial effusion. Thoracic   aorta normal caliber.  Calcified mediastinal nodes.    IMAGED ABDOMEN: Splenomegaly; 16.5 cm.    SOFT TISSUES: Unremarkable.    BONES: Unremarkable.    IMPRESSION:.    New small right pleural effusion and unchanged small left pleural   effusion.    Findings of thoracic sarcoidosis with interval improvement of left upper   lobe consolidation and right apical ground-glass opacities compared to prior.    TC OLSON MD; Attending Radiologist  This document has been electronically signed. 2022  1:00PM  ---------------------------------------------------------------------------------------------------------------  EXAM:  XR CHEST AP OR PA 1V                          PROCEDURE DATE:  2022      INTERPRETATION:  EXAMINATION: XR CHEST    CLINICAL INDICATION: Shortness of breath    TECHNIQUE: Single frontal, portable view of the chest was obtained.    COMPARISON: Chest x-ray 3/14/2017.    FINDINGS:  Left-sided dual-chamber AICD.  The heart is not accurately assessed in this AP projection.  Moderate pulmonary edema.  Small left pleural effusion.  No pneumothorax.    No acute bony abnormality.    IMPRESSION:  Moderate pulmonary edema.  Small left pleural effusion.    PRISCA RUTHERFORD MD; Resident Interventional Radiology  This document has been electronically signed.  TRISH ECHAVARRIA MD; Attending Radiologist  This document has been electronically signed. 2022  8:54AM  ---------------------------------------------------------------------------------------------------------------  EXAM:  CT CHEST      PROCEDURE DATE:  2021       FINDINGS:    AIRWAYS AND LUNGS: The central tracheobronchial tree is patent.  Bilateral upper lobe perihilar hilar consolidation with scattered perilymphatic nodules are overall slightly increased compared to the prior study. Nonvisualization prior right middle lobe1.6 cm nodule    MEDIASTINUM AND PLEURA: Calcified mediastinal lymph nodes. The visualized portion of the thyroid gland is unremarkable. Small left pleural effusion There is no pneumothorax.    HEART AND VESSELS: There is cardiomegaly.  There are atherosclerotic calcifications of the aorta and coronary arteries.  There is no pericardial effusion.    UPPER ABDOMEN: Images of the upper abdomen demonstrate no abnormality.    BONES AND SOFT TISSUES: The bones are unremarkable.  The soft tissues are unremarkable.    TUBES/LINES: None.    IMPRESSION:  Bilateral upper lobe perihilar hilar consolidation with scattered perilymphatic nodules are overall slightly increased compared to the prior study. Nonvisualization prior right middle lobe 1.6 cm nodule    DEANA LOPEZ MD; Attending Radiologist   This document has been electronically signed. Feb  3 2021  2:43PM  ---------------------------------------------------------------------------------------------------------------

## 2022-04-27 NOTE — SBIRT NOTE ADULT - NSSBIRTALCPOSREINDET_GEN_A_CORE
Patient educated regarding ETOH and interaction with meds for which patient acknowledges understanding.  LCSW encourages patient to have conversation with his physician regarding this matter.

## 2022-04-27 NOTE — PROGRESS NOTE ADULT - SUBJECTIVE AND OBJECTIVE BOX
Patient is a 72y old  Male who presents with a chief complaint of sob, chest pain (2022 11:01)      INTERVAL History of Present Illness/OVERNIGHT EVENTS: stable clinical status.  glucose elevated but better    MEDICATIONS  (STANDING):  albuterol/ipratropium for Nebulization 3 milliLiter(s) Nebulizer every 6 hours  aspirin enteric coated 81 milliGRAM(s) Oral daily  baclofen 10 milliGRAM(s) Oral three times a day  buDESOnide    Inhalation Suspension 0.5 milliGRAM(s) Inhalation every 12 hours  chlorhexidine 2% Cloths 1 Application(s) Topical <User Schedule>  clonazePAM  Tablet 1 milliGRAM(s) Oral <User Schedule>  dexAMETHasone     Tablet 6 milliGRAM(s) Oral daily  dextrose 5%. 1000 milliLiter(s) (50 mL/Hr) IV Continuous <Continuous>  dextrose 5%. 1000 milliLiter(s) (100 mL/Hr) IV Continuous <Continuous>  dextrose 50% Injectable 25 Gram(s) IV Push once  dextrose 50% Injectable 12.5 Gram(s) IV Push once  dextrose 50% Injectable 25 Gram(s) IV Push once  famotidine    Tablet 20 milliGRAM(s) Oral daily  glucagon  Injectable 1 milliGRAM(s) IntraMuscular once  heparin   Injectable 5000 Unit(s) SubCutaneous three times a day  insulin glargine Injectable (LANTUS) 25 Unit(s) SubCutaneous every morning  insulin lispro (ADMELOG) corrective regimen sliding scale   SubCutaneous three times a day before meals  insulin lispro (ADMELOG) corrective regimen sliding scale   SubCutaneous at bedtime  insulin lispro Injectable (ADMELOG) 10 Unit(s) SubCutaneous three times a day before meals  metoprolol succinate ER 75 milliGRAM(s) Oral daily  tamsulosin 0.8 milliGRAM(s) Oral at bedtime    MEDICATIONS  (PRN):  dextrose Oral Gel 15 Gram(s) Oral once PRN Blood Glucose LESS THAN 70 milliGRAM(s)/deciliter      Allergies    penicillin (Faint)    Intolerances        REVIEW OF SYSTEMS:  Negative unless otherwise specified above.    Vital Signs Last 24 Hrs  T(C): 36.2 (2022 11:40), Max: 36.8 (2022 00:17)  T(F): 97.2 (2022 11:40), Max: 98.2 (2022 00:17)  HR: 64 (2022 11:40) (64 - 80)  BP: 128/80 (2022 11:40) (117/70 - 128/80)  BP(mean): --  RR: 18 (2022 11:40) (18 - 19)  SpO2: 98% (2022 11:40) (97% - 99%)        PHYSICAL EXAM:  NAD  lungs clear  RRR  abdomen soft, mild distension  no leg edema  non-focal neuro exam      LABS:                        9.9    6.90  )-----------( 232      ( 2022 06:16 )             28.9     2022 06:16    131    |  93     |  82     ----------------------------<  326    4.0     |  23     |  3.24     Ca    10.0       2022 06:16  Phos  4.5       2022 06:16  Mg     2.1       2022 06:16    TPro  6.4    /  Alb  x      /  TBili  x      /  DBili  x      /  AST  x      /  ALT  x      /  AlkPhos  x      2022 09:14    PT/INR - ( 2022 06:29 )   PT: 12.3 sec;   INR: 1.06 ratio         PTT - ( 2022 06:29 )  PTT:23.9 sec  Urinalysis Basic - ( 2022 22:37 )    Color: Light Yellow / Appearance: Clear / S.018 / pH: x  Gluc: x / Ketone: Negative  / Bili: Negative / Urobili: Negative   Blood: x / Protein: Negative / Nitrite: Negative   Leuk Esterase: Negative / RBC: x / WBC x   Sq Epi: x / Non Sq Epi: x / Bacteria: x      CAPILLARY BLOOD GLUCOSE      POCT Blood Glucose.: 376 mg/dL (2022 12:18)  POCT Blood Glucose.: 377 mg/dL (2022 08:20)  POCT Blood Glucose.: 379 mg/dL (2022 21:53)  POCT Blood Glucose.: 483 mg/dL (2022 17:13)  POCT Blood Glucose.: 464 mg/dL (2022 17:11)      RADIOLOGY & ADDITIONAL TESTS:      Images reviewed personally    Consultant Notes Reviewed and Care Discussed with relevant Consultants.

## 2022-04-27 NOTE — PROGRESS NOTE ADULT - ASSESSMENT
72M c hx HTN, pulmonary sarcoidosis, asthma, CHF (?reported EF 45%) s/p AICD, DM2, ?CKD, "extra heart beat" on amiodarone, intermittent intractable hiccups, recent hospitalization at [place] (reportedly hospitalized for 12 days, d/c'd 4 days ago) for ?CP and CHF exacerbation, pw acute hypoxic respiratory failure, sepsis, [facility], 2/2 covid and poss CHF exacerbation, c/b steroid induced hyperglycemia

## 2022-04-27 NOTE — CONSULT NOTE ADULT - ASSESSMENT
72M c hx HTN, pulmonary sarcoidosis, asthma, CHF (?reported EF 45%) s/p AICD, DM2, ?CKD, "extra heart beat" on amiodarone, intermittent intractable hiccups, recent hospitalization at [place] (reportedly hospitalized for 12 days, d/c'd 4 days ago) for ?CP and CHF exacerbation, pw SOB and CP.    At discharge, pt states he was breathing well and feeling good. pt states his course at [place] was a nuc pharm stress test which was reportedly normal. Pt reportedly diuresed with lasix. Pt also started on prednisone 30 for poss sarcoidosis. Pt is not sure why he was started on amiodarone, but states he was told he had an "extra heart beat". Pt also says his Cr was 3.5 while at [place]. Pt states he did not have covid while at [place].  Yesterday morning, awoke with mild SOB, worse with lying flat, improved with standing up. After eating dinner and one hour prior to arrival to ED, pt reports acute worsening of his SOB, chest tightness, coughing. Pt reports good compliance with his home meds. Pt states he does not eat any extra salt. At baseline very functional, completes his own IADLs, drives. Denies fevers, chills. Pt denies any relieving or exacerbating factors.  (26 Apr 2022 04:39)    Endocrine consulted for steroid-induced hyperglycemia.    Poorly controlled T2DM with steroid-induced hyperglycemia  A1c 7.6, close to goal for this 71 y/o patient but could be improved  -Hold oral DM agents while inpatient  -Increase Lantus to 30 units q24h. DO NOT HOLD IF NPO.  -Increase Admelog to 15 units TID pre-meal. HOLD IF NPO.  -Once off steroids expect a reduction in insulin requirements  -Use moderate Admelog correction scale pre-meal  -Use moderate Admelog correction scale at bedtime  -Fingerstick BG before meals and bedtime  -Goal -180  -Carbohydrate consistent diet  Discharge plan:  -Likely to discharge patient home on basal insulin plus Januvia (at appropriate renal dose). Will d/c sulfonylurea. May consider adding Prandin. Final regimen pending clinical course.  -Recommend routine outpatient ophthalmology and endocrinology f/u. Can f/u with his home Endocrinologist.    Steroid-induced hyperglycemia  Has not been on steroids for his sarcoidosis for 1 year outpatient. Once off dexamethasone for covid, insulin requirements will drop.    HTN  -Outpatient goal BP <130/80. Management per primary team.    Discussed with primary team.    Jose Manuel Godfrey DO, Endocrinology Fellow  Pager [phone] from 9am to 5pm. After hours and on weekends, please call [phone]. 72M c hx HTN, pulmonary sarcoidosis, asthma, CHF (?reported EF 45%) s/p AICD, DM2, ?CKD, "extra heart beat" on amiodarone, intermittent intractable hiccups, recent hospitalization at [place] (reportedly hospitalized for 12 days, d/c'd 4 days ago) for ?CP and CHF exacerbation, pw SOB and CP.    At discharge, pt states he was breathing well and feeling good. pt states his course at [place] was a nuc pharm stress test which was reportedly normal. Pt reportedly diuresed with lasix. Pt also started on prednisone 30 for poss sarcoidosis. Pt is not sure why he was started on amiodarone, but states he was told he had an "extra heart beat". Pt also says his Cr was 3.5 while at [place]. Pt states he did not have covid while at [place].  Yesterday morning, awoke with mild SOB, worse with lying flat, improved with standing up. After eating dinner and one hour prior to arrival to ED, pt reports acute worsening of his SOB, chest tightness, coughing. Pt reports good compliance with his home meds. Pt states he does not eat any extra salt. At baseline very functional, completes his own IADLs, drives. Denies fevers, chills. Pt denies any relieving or exacerbating factors.  (26 Apr 2022 04:39)    Endocrine consulted for steroid-induced hyperglycemia.    Poorly controlled T2DM with steroid-induced hyperglycemia  A1c 7.6, close to goal for this 73 y/o patient but could be improved  -Hold oral DM agents while inpatient  -Increase Lantus to 32 units q24h. DO NOT HOLD IF NPO.  -Increase Admelog to 18 units TID pre-meal. HOLD IF NPO.  -Once off steroids expect a reduction in insulin requirements  -Use moderate Admelog correction scale pre-meal  -Use moderate Admelog correction scale at bedtime  -Fingerstick BG before meals and bedtime  -Goal -180  -Carbohydrate consistent diet  Discharge plan:  -Likely to discharge patient home on basal insulin plus Januvia (at appropriate renal dose). Will d/c sulfonylurea given renal dysfunction. Final regimen pending clinical course.  -Recommend routine outpatient ophthalmology and endocrinology f/u. Can f/u with his home Endocrinologist.    Steroid-induced hyperglycemia  Has not been on steroids for his sarcoidosis for 1 year outpatient. Once off dexamethasone for covid, insulin requirements will drop.    HTN  -Outpatient goal BP <130/80. Management per primary team.    Discussed with primary team.    Jsoe Manuel Godfrey DO, Endocrinology Fellow  Pager [phone] from 9am to 5pm. After hours and on weekends, please call [phone]. 72M c hx HTN, pulmonary sarcoidosis, asthma, CHF (?reported EF 45%) s/p AICD, DM2, ?CKD, "extra heart beat" on amiodarone, intermittent intractable hiccups, recent hospitalization at [place] (reportedly hospitalized for 12 days, d/c'd 4 days ago) for ?CP and CHF exacerbation, pw SOB and CP.    At discharge, pt states he was breathing well and feeling good. pt states his course at [place] was a nuc pharm stress test which was reportedly normal. Pt reportedly diuresed with lasix. Pt also started on prednisone 30 for poss sarcoidosis. Pt is not sure why he was started on amiodarone, but states he was told he had an "extra heart beat". Pt also says his Cr was 3.5 while at [place]. Pt states he did not have covid while at [place].  Yesterday morning, awoke with mild SOB, worse with lying flat, improved with standing up. After eating dinner and one hour prior to arrival to ED, pt reports acute worsening of his SOB, chest tightness, coughing. Pt reports good compliance with his home meds. Pt states he does not eat any extra salt. At baseline very functional, completes his own IADLs, drives. Denies fevers, chills. Pt denies any relieving or exacerbating factors.  (26 Apr 2022 04:39)    Endocrine consulted for steroid-induced hyperglycemia.    Poorly controlled T2DM with steroid-induced hyperglycemia  A1c 7.6, close to goal for this 71 y/o patient but could be improved  -Hold oral DM agents while inpatient  -Increase Lantus to 32 units q24h. DO NOT HOLD IF NPO.  -Increase Admelog to 18 units TID pre-meal. HOLD IF NPO.  -Once off steroids expect a reduction in insulin requirements  -Use moderate Admelog correction scale pre-meal  -Use moderate Admelog correction scale at bedtime  -Fingerstick BG before meals and bedtime  -Goal -180  -Carbohydrate consistent diet  Discharge plan:  -Likely to discharge patient home on basal insulin plus Januvia (at appropriate renal dose). Will d/c sulfonylurea given renal dysfunction. Final regimen pending clinical course.  -Recommend routine outpatient ophthalmology and endocrinology f/u. Can f/u with his home Endocrinologist.    Steroid-induced hyperglycemia  Has not been on steroids for his sarcoidosis for 1 year outpatient. Once off dexamethasone for covid, insulin requirements will drop.    HTN  -Outpatient goal BP <130/80. Management per primary team.    Jose Manuel Godfrey DO, Endocrinology Fellow  Pager [phone] from 9am to 5pm. After hours and on weekends, please call [phone].

## 2022-04-28 DIAGNOSIS — U07.1 COVID-19: ICD-10-CM

## 2022-04-28 DIAGNOSIS — E11.65 TYPE 2 DIABETES MELLITUS WITH HYPERGLYCEMIA: ICD-10-CM

## 2022-04-28 LAB
ALBUMIN SERPL ELPH-MCNC: 3.9 G/DL — SIGNIFICANT CHANGE UP (ref 3.3–5)
ALP SERPL-CCNC: 88 U/L — SIGNIFICANT CHANGE UP (ref 40–120)
ALT FLD-CCNC: 47 U/L — HIGH (ref 10–45)
ANION GAP SERPL CALC-SCNC: 19 MMOL/L — HIGH (ref 5–17)
AST SERPL-CCNC: 10 U/L — SIGNIFICANT CHANGE UP (ref 10–40)
BASOPHILS # BLD AUTO: 0.01 K/UL — SIGNIFICANT CHANGE UP (ref 0–0.2)
BASOPHILS NFR BLD AUTO: 0.1 % — SIGNIFICANT CHANGE UP (ref 0–2)
BILIRUB SERPL-MCNC: 0.5 MG/DL — SIGNIFICANT CHANGE UP (ref 0.2–1.2)
BUN SERPL-MCNC: 86 MG/DL — HIGH (ref 7–23)
CALCIUM SERPL-MCNC: 9.8 MG/DL — SIGNIFICANT CHANGE UP (ref 8.4–10.5)
CHLORIDE SERPL-SCNC: 92 MMOL/L — LOW (ref 96–108)
CO2 SERPL-SCNC: 20 MMOL/L — LOW (ref 22–31)
CREAT SERPL-MCNC: 2.99 MG/DL — HIGH (ref 0.5–1.3)
CRP SERPL-MCNC: 13 MG/L — HIGH (ref 0–4)
D DIMER BLD IA.RAPID-MCNC: 247 NG/ML DDU — HIGH
EGFR: 22 ML/MIN/1.73M2 — LOW
EOSINOPHIL # BLD AUTO: 0.01 K/UL — SIGNIFICANT CHANGE UP (ref 0–0.5)
EOSINOPHIL NFR BLD AUTO: 0.1 % — SIGNIFICANT CHANGE UP (ref 0–6)
ERYTHROCYTE [SEDIMENTATION RATE] IN BLOOD: 31 MM/HR — HIGH (ref 0–20)
FERRITIN SERPL-MCNC: 309 NG/ML — SIGNIFICANT CHANGE UP (ref 30–400)
GLUCOSE BLDC GLUCOMTR-MCNC: 222 MG/DL — HIGH (ref 70–99)
GLUCOSE BLDC GLUCOMTR-MCNC: 242 MG/DL — HIGH (ref 70–99)
GLUCOSE BLDC GLUCOMTR-MCNC: 324 MG/DL — HIGH (ref 70–99)
GLUCOSE BLDC GLUCOMTR-MCNC: 331 MG/DL — HIGH (ref 70–99)
GLUCOSE BLDC GLUCOMTR-MCNC: 343 MG/DL — HIGH (ref 70–99)
GLUCOSE BLDC GLUCOMTR-MCNC: 372 MG/DL — HIGH (ref 70–99)
GLUCOSE BLDC GLUCOMTR-MCNC: 435 MG/DL — HIGH (ref 70–99)
GLUCOSE SERPL-MCNC: 334 MG/DL — HIGH (ref 70–99)
HCT VFR BLD CALC: 31 % — LOW (ref 39–50)
HGB BLD-MCNC: 10.7 G/DL — LOW (ref 13–17)
IMM GRANULOCYTES NFR BLD AUTO: 0.9 % — SIGNIFICANT CHANGE UP (ref 0–1.5)
LYMPHOCYTES # BLD AUTO: 0.32 K/UL — LOW (ref 1–3.3)
LYMPHOCYTES # BLD AUTO: 4.7 % — LOW (ref 13–44)
MCHC RBC-ENTMCNC: 31 PG — SIGNIFICANT CHANGE UP (ref 27–34)
MCHC RBC-ENTMCNC: 34.5 GM/DL — SIGNIFICANT CHANGE UP (ref 32–36)
MCV RBC AUTO: 89.9 FL — SIGNIFICANT CHANGE UP (ref 80–100)
MONOCYTES # BLD AUTO: 0.78 K/UL — SIGNIFICANT CHANGE UP (ref 0–0.9)
MONOCYTES NFR BLD AUTO: 11.4 % — SIGNIFICANT CHANGE UP (ref 2–14)
NEUTROPHILS # BLD AUTO: 5.68 K/UL — SIGNIFICANT CHANGE UP (ref 1.8–7.4)
NEUTROPHILS NFR BLD AUTO: 82.8 % — HIGH (ref 43–77)
NRBC # BLD: 0 /100 WBCS — SIGNIFICANT CHANGE UP (ref 0–0)
PLATELET # BLD AUTO: 248 K/UL — SIGNIFICANT CHANGE UP (ref 150–400)
POTASSIUM SERPL-MCNC: 3.7 MMOL/L — SIGNIFICANT CHANGE UP (ref 3.5–5.3)
POTASSIUM SERPL-SCNC: 3.7 MMOL/L — SIGNIFICANT CHANGE UP (ref 3.5–5.3)
PROT SERPL-MCNC: 6.8 G/DL — SIGNIFICANT CHANGE UP (ref 6–8.3)
RBC # BLD: 3.45 M/UL — LOW (ref 4.2–5.8)
RBC # FLD: 13.4 % — SIGNIFICANT CHANGE UP (ref 10.3–14.5)
SODIUM SERPL-SCNC: 131 MMOL/L — LOW (ref 135–145)
WBC # BLD: 6.86 K/UL — SIGNIFICANT CHANGE UP (ref 3.8–10.5)
WBC # FLD AUTO: 6.86 K/UL — SIGNIFICANT CHANGE UP (ref 3.8–10.5)

## 2022-04-28 PROCEDURE — 99232 SBSQ HOSP IP/OBS MODERATE 35: CPT

## 2022-04-28 PROCEDURE — 99233 SBSQ HOSP IP/OBS HIGH 50: CPT

## 2022-04-28 RX ADMIN — POLYETHYLENE GLYCOL 3350 17 GRAM(S): 17 POWDER, FOR SOLUTION ORAL at 12:02

## 2022-04-28 RX ADMIN — Medication 40 MILLIGRAM(S): at 06:53

## 2022-04-28 RX ADMIN — ONDANSETRON 4 MILLIGRAM(S): 8 TABLET, FILM COATED ORAL at 06:23

## 2022-04-28 RX ADMIN — Medication 81 MILLIGRAM(S): at 12:02

## 2022-04-28 RX ADMIN — CHLORHEXIDINE GLUCONATE 1 APPLICATION(S): 213 SOLUTION TOPICAL at 06:25

## 2022-04-28 RX ADMIN — Medication 1 MILLIGRAM(S): at 23:20

## 2022-04-28 RX ADMIN — Medication 3 MILLILITER(S): at 06:14

## 2022-04-28 RX ADMIN — Medication 18 UNIT(S): at 17:12

## 2022-04-28 RX ADMIN — Medication 0.5 MILLIGRAM(S): at 06:16

## 2022-04-28 RX ADMIN — FAMOTIDINE 20 MILLIGRAM(S): 10 INJECTION INTRAVENOUS at 12:02

## 2022-04-28 RX ADMIN — Medication 10: at 12:01

## 2022-04-28 RX ADMIN — HEPARIN SODIUM 5000 UNIT(S): 5000 INJECTION INTRAVENOUS; SUBCUTANEOUS at 06:15

## 2022-04-28 RX ADMIN — Medication 6 MILLIGRAM(S): at 06:17

## 2022-04-28 RX ADMIN — PANTOPRAZOLE SODIUM 40 MILLIGRAM(S): 20 TABLET, DELAYED RELEASE ORAL at 12:02

## 2022-04-28 RX ADMIN — Medication 3 MILLILITER(S): at 12:02

## 2022-04-28 RX ADMIN — Medication 8: at 08:39

## 2022-04-28 RX ADMIN — Medication 0: at 23:21

## 2022-04-28 RX ADMIN — HEPARIN SODIUM 5000 UNIT(S): 5000 INJECTION INTRAVENOUS; SUBCUTANEOUS at 13:28

## 2022-04-28 RX ADMIN — TAMSULOSIN HYDROCHLORIDE 0.8 MILLIGRAM(S): 0.4 CAPSULE ORAL at 23:20

## 2022-04-28 RX ADMIN — Medication 18 UNIT(S): at 08:40

## 2022-04-28 RX ADMIN — Medication 3 MILLILITER(S): at 00:34

## 2022-04-28 RX ADMIN — HEPARIN SODIUM 5000 UNIT(S): 5000 INJECTION INTRAVENOUS; SUBCUTANEOUS at 23:19

## 2022-04-28 RX ADMIN — Medication 4: at 17:11

## 2022-04-28 RX ADMIN — Medication 8: at 00:31

## 2022-04-28 RX ADMIN — INSULIN GLARGINE 32 UNIT(S): 100 INJECTION, SOLUTION SUBCUTANEOUS at 08:56

## 2022-04-28 RX ADMIN — Medication 18 UNIT(S): at 12:01

## 2022-04-28 NOTE — PROGRESS NOTE ADULT - SUBJECTIVE AND OBJECTIVE BOX
[facility]   [phone]  RENAL FOLLOW UP NOTE  --------------------------------------------------------------------------------  Chief Complaint:    24 hour events/subjective:    Patient seen and examined.   off O2  c/o dizziness, headaches    PAST HISTORY  --------------------------------------------------------------------------------  No significant changes to PMH, PSH, FHx, SHx, unless otherwise noted    ALLERGIES & MEDICATIONS  --------------------------------------------------------------------------------  Allergies    penicillin (Faint)    Intolerances      Standing Inpatient Medications  aspirin enteric coated 81 milliGRAM(s) Oral daily  chlorhexidine 2% Cloths 1 Application(s) Topical <User Schedule>  clonazePAM  Tablet 1 milliGRAM(s) Oral <User Schedule>  dextrose 5%. 1000 milliLiter(s) IV Continuous <Continuous>  dextrose 5%. 1000 milliLiter(s) IV Continuous <Continuous>  dextrose 50% Injectable 25 Gram(s) IV Push once  dextrose 50% Injectable 12.5 Gram(s) IV Push once  dextrose 50% Injectable 25 Gram(s) IV Push once  famotidine    Tablet 20 milliGRAM(s) Oral daily  furosemide    Tablet 40 milliGRAM(s) Oral daily  glucagon  Injectable 1 milliGRAM(s) IntraMuscular once  heparin   Injectable 5000 Unit(s) SubCutaneous three times a day  insulin glargine Injectable (LANTUS) 32 Unit(s) SubCutaneous every morning  insulin lispro (ADMELOG) corrective regimen sliding scale   SubCutaneous three times a day before meals  insulin lispro (ADMELOG) corrective regimen sliding scale   SubCutaneous at bedtime  insulin lispro Injectable (ADMELOG) 18 Unit(s) SubCutaneous three times a day before meals  metoprolol succinate ER 75 milliGRAM(s) Oral daily  pantoprazole    Tablet 40 milliGRAM(s) Oral daily  polyethylene glycol 3350 17 Gram(s) Oral daily  senna 2 Tablet(s) Oral at bedtime  tamsulosin 0.8 milliGRAM(s) Oral at bedtime    PRN Inpatient Medications  dextrose Oral Gel 15 Gram(s) Oral once PRN      REVIEW OF SYSTEMS  --------------------------------------------------------------------------------    Gen: denies fevers/chills,  CVS: denies chest pain/palpitations  Resp: denies SOB/Cough  GI: Denies N/V/Abd pain  : Denies dysuria/oliguria/hematuria    VITALS/PHYSICAL EXAM  --------------------------------------------------------------------------------  T(C): 37.3 (04-28-22 @ 11:02), Max: 37.3 (04-28-22 @ 11:02)  HR: 69 (04-28-22 @ 11:02) (62 - 71)  BP: 126/69 (04-28-22 @ 11:02) (122/70 - 132/80)  RR: 18 (04-28-22 @ 11:02) (18 - 19)  SpO2: 99% (04-28-22 @ 11:02) (96% - 99%)  Wt(kg): --        04-27-22 @ 07:01  -  04-28-22 @ 07:00  --------------------------------------------------------  IN: 550 mL / OUT: 2050 mL / NET: -1500 mL    04-28-22 @ 07:01  -  04-28-22 @ 14:16  --------------------------------------------------------  IN: 480 mL / OUT: 850 mL / NET: -370 mL      Physical Exam:  	  	Gen: Appears comfortable, on O2  	Pulm: decrease bs  no rales or ronchi or wheezing  	CV: No JVD. RRR, S1S2  	Abd: +BS, soft, nontender/nondistended + hernia midline   	: No suprapubic tenderness  	UE: Warm, no cyanosis  no clubbing,  no edema;  	LE: Warm, no cyanosis  no clubbing, no edema    LABS/STUDIES  --------------------------------------------------------------------------------              10.7   6.86  >-----------<  248      [04-28-22 @ 06:29]              31.0     131  |  92  |  86  ----------------------------<  334      [04-28-22 @ 06:27]  3.7   |  20  |  2.99        Ca     9.8     [04-28-22 @ 06:27]      Mg     2.1     [04-27-22 @ 06:16]      Phos  4.5     [04-27-22 @ 06:16]    TPro  6.8  /  Alb  3.9  /  TBili  0.5  /  DBili  x   /  AST  10  /  ALT  47  /  AlkPhos  88  [04-28-22 @ 06:27]          Creatinine Trend:  SCr 2.99 [04-28 @ 06:27]  SCr 3.03 [04-27 @ 19:42]  SCr 3.24 [04-27 @ 06:16]  SCr 3.12 [04-26 @ 21:39]  SCr 3.03 [04-26 @ 13:12]              Urinalysis - [04-25-22 @ 22:37]      Color Light Yellow / Appearance Clear / SG 1.018 / pH 6.5      Gluc 1000 mg/dL / Ketone Negative  / Bili Negative / Urobili Negative       Blood Negative / Protein Negative / Leuk Est Negative / Nitrite Negative      RBC  / WBC  / Hyaline  / Gran  / Sq Epi  / Non Sq Epi  / Bacteria     Urine Protein 4      [04-27-22 @ 09:23]    Ferritin 309      [04-28-22 @ 07:37]  PTH -- (Ca 10.7)      [04-27-22 @ 01:48]   12  PTH -- (Ca 11.1)      [04-26-22 @ 19:48]   7  Vitamin D (25OH) 30.5      [04-27-22 @ 09:25]  TSH 0.62      [04-26-22 @ 08:53]

## 2022-04-28 NOTE — PROGRESS NOTE ADULT - SUBJECTIVE AND OBJECTIVE BOX
Chief Complaint: Evaluating this 73 y/o M for uncontrolled Type 2 DM w/ hyperglycemia exacerbated by steroids      Interval History: Decadron discontinued today. Hyperglycemic exacerbated by decadron.     MEDICATIONS  (STANDING):  aspirin enteric coated 81 milliGRAM(s) Oral daily  chlorhexidine 2% Cloths 1 Application(s) Topical <User Schedule>  clonazePAM  Tablet 1 milliGRAM(s) Oral <User Schedule>  dextrose 5%. 1000 milliLiter(s) (50 mL/Hr) IV Continuous <Continuous>  dextrose 5%. 1000 milliLiter(s) (100 mL/Hr) IV Continuous <Continuous>  dextrose 50% Injectable 25 Gram(s) IV Push once  dextrose 50% Injectable 12.5 Gram(s) IV Push once  dextrose 50% Injectable 25 Gram(s) IV Push once  famotidine    Tablet 20 milliGRAM(s) Oral daily  furosemide    Tablet 40 milliGRAM(s) Oral daily  glucagon  Injectable 1 milliGRAM(s) IntraMuscular once  heparin   Injectable 5000 Unit(s) SubCutaneous three times a day  insulin glargine Injectable (LANTUS) 32 Unit(s) SubCutaneous every morning  insulin lispro (ADMELOG) corrective regimen sliding scale   SubCutaneous three times a day before meals  insulin lispro (ADMELOG) corrective regimen sliding scale   SubCutaneous at bedtime  insulin lispro Injectable (ADMELOG) 18 Unit(s) SubCutaneous three times a day before meals  metoprolol succinate ER 75 milliGRAM(s) Oral daily  pantoprazole    Tablet 40 milliGRAM(s) Oral daily  polyethylene glycol 3350 17 Gram(s) Oral daily  senna 2 Tablet(s) Oral at bedtime  tamsulosin 0.8 milliGRAM(s) Oral at bedtime    MEDICATIONS  (PRN):  dextrose Oral Gel 15 Gram(s) Oral once PRN Blood Glucose LESS THAN 70 milliGRAM(s)/deciliter      Allergies    penicillin (Faint)    Intolerances      Review of Systems:  Constitutional: No fever +dizziness  Eyes: No blurry vision  Cardiovascular: No chest pain  Respiratory: No SOB  GI: No abdominal pain, No nausea, No vomiting  Endocrine: as noted in HPI    All other negative      PHYSICAL EXAM:  VITALS: T(C): 37.3 (04-28-22 @ 11:02)  T(F): 99.2 (04-28-22 @ 11:02), Max: 99.2 (04-28-22 @ 11:02)  HR: 69 (04-28-22 @ 11:02) (62 - 71)  BP: 126/69 (04-28-22 @ 11:02) (122/70 - 132/80)  RR:  (18 - 19)  SpO2:  (96% - 99%)  Wt(kg): --  GENERAL: NAD at this time  EYES: EOMI, No proptosis  HEENT:  Atraumatic, Normocephalic,   RESPIRATORY: Clear to auscultation bilaterally, full excursion, non labored  CARDIOVASCULAR: Regular rhythm; normal S1/S2, no peripheral edema  GI: Soft, nontender, non distended, normal bowel sounds  SKIN: Warm and dry  PSYCH: normal affect, normal mood      POCT Blood Glucose.: 222 mg/dL (04-28-22 @ 16:43)  POCT Blood Glucose.: 372 mg/dL (04-28-22 @ 11:38)  POCT Blood Glucose.: 343 mg/dL (04-28-22 @ 07:58)  POCT Blood Glucose.: 324 mg/dL (04-28-22 @ 06:02)  POCT Blood Glucose.: 435 mg/dL (04-28-22 @ 00:29)  POCT Blood Glucose.: 280 mg/dL (04-27-22 @ 21:43)  POCT Blood Glucose.: 351 mg/dL (04-27-22 @ 16:59)  POCT Blood Glucose.: 417 mg/dL (04-27-22 @ 16:57)  POCT Blood Glucose.: 376 mg/dL (04-27-22 @ 12:18)  POCT Blood Glucose.: 377 mg/dL (04-27-22 @ 08:20)  POCT Blood Glucose.: 379 mg/dL (04-26-22 @ 21:53)  POCT Blood Glucose.: 483 mg/dL (04-26-22 @ 17:13)  POCT Blood Glucose.: 464 mg/dL (04-26-22 @ 17:11)  POCT Blood Glucose.: 495 mg/dL (04-26-22 @ 12:25)  POCT Blood Glucose.: 536 mg/dL (04-26-22 @ 08:16)  POCT Blood Glucose.: 570 mg/dL (04-26-22 @ 08:14)  POCT Blood Glucose.: 549 mg/dL (04-26-22 @ 06:41)  POCT Blood Glucose.: 473 mg/dL (04-26-22 @ 06:39)  POCT Blood Glucose.: 407 mg/dL (04-25-22 @ 23:35)        04-28    131<L>  |  92<L>  |  86<H>  ----------------------------<  334<H>  3.7   |  20<L>  |  2.99<H>    EGFR if : x   EGFR if non : x     Ca    9.8      04-28  Mg     2.1     04-27  Phos  4.5     04-27    TPro  6.8  /  Alb  3.9  /  TBili  0.5  /  DBili  x   /  AST  10  /  ALT  47<H>  /  AlkPhos  88  04-28        Thyroid Function Tests:  04-26 @ 08:53 TSH 0.62 FreeT4 -- T3 -- Anti TPO -- Anti Thyroglobulin Ab -- TSI --

## 2022-04-28 NOTE — PROGRESS NOTE ADULT - SUBJECTIVE AND OBJECTIVE BOX
DATE OF SERVICE: 04-28-22 @ 10:50    Patient is a 72y old  Male who presents with a chief complaint of sob, chest pain (27 Apr 2022 17:30)      INTERVAL HISTORY: Feels tired and slightly nauseous today.     REVIEW OF SYSTEMS:  CONSTITUTIONAL: No weakness  EYES/ENT: No visual changes;  No throat pain   NECK: No pain or stiffness  RESPIRATORY: No cough, wheezing; No shortness of breath  CARDIOVASCULAR: No chest pain or palpitations  GASTROINTESTINAL: No abdominal  pain. No nausea, vomiting, or hematemesis  GENITOURINARY: No dysuria, frequency or hematuria  NEUROLOGICAL: No stroke like symptoms  SKIN: No rashes    	  MEDICATIONS:  furosemide    Tablet 40 milliGRAM(s) Oral daily  metoprolol succinate ER 75 milliGRAM(s) Oral daily  tamsulosin 0.8 milliGRAM(s) Oral at bedtime        PHYSICAL EXAM:  T(C): 36.3 (04-28-22 @ 05:30), Max: 36.3 (04-27-22 @ 21:30)  HR: 62 (04-28-22 @ 05:30) (62 - 71)  BP: 122/70 (04-27-22 @ 21:30) (122/70 - 132/80)  RR: 18 (04-27-22 @ 21:30) (18 - 19)  SpO2: 98% (04-27-22 @ 21:30) (96% - 98%)  Wt(kg): --  I&O's Summary    27 Apr 2022 07:01  -  28 Apr 2022 07:00  --------------------------------------------------------  IN: 550 mL / OUT: 2050 mL / NET: -1500 mL          Appearance: In no distress	  HEENT:    PERRL, EOMI	  Cardiovascular:  S1 S2, No JVD  Respiratory: Lungs clear to auscultation	  Gastrointestinal:  Soft, Non-tender, + BS	  Vascularature:  No edema of LE  Psychiatric: Appropriate affect   Neuro: no acute focal deficits                               10.7   6.86  )-----------( 248      ( 28 Apr 2022 06:29 )             31.0     04-28    131<L>  |  92<L>  |  86<H>  ----------------------------<  334<H>  3.7   |  20<L>  |  2.99<H>    Ca    9.8      28 Apr 2022 06:27  Phos  4.5     04-27  Mg     2.1     04-27    TPro  6.8  /  Alb  3.9  /  TBili  0.5  /  DBili  x   /  AST  10  /  ALT  47<H>  /  AlkPhos  88  04-28        Labs personally reviewed      ASSESSMENT/PLAN: 	    72M c hx HTN, pulmonary sarcoidosis, asthma, CHF (?reported EF 45%) s/p AICD, DM2, ?CKD, "extra heart beat" on amiodarone, intermittent intractable hiccups, recent hospitalization at [place] (reportedly hospitalized for 12 days, d/c'd 4 days ago) for ?CP and CHF exacerbation, pw acute hypoxic respiratory failure, sepsis, [facility], 2/2 covid and poss CHF exacerbation, c/b steroid induced hyperglycemia    Problem/Plan - 1:  ·  Problem: Acute systolic HF  ·  Plan: HF with superimposed COVID  - much improved with diuresis  - BNP at discharge at [place] 1900, now >11.6K  - Continue with Lasix IV daily and transition to PO in next 48 hours  - Now on PO Lasix presenting as euvolemic    Problem/Plan - 2:  ·  Problem: Pneumonia due to COVID-19 virus.   ·  Plan: - Decadron 6 qd as per primary team  - unable to start remdesivir 2/2 renal failure  - Pulm recs appreciated    Problem/Plan - 3:  - PVCs/?NSVT at [place] and was started on Amio at the time  - discussed with OP cardio Stanislav Bell and Fanny, will hold given risk for lung toxicity    Problem/Plan - 4:  ·  Problem: Chest pain.   ·  Plan: Recent [facility] test at [facility] with small inferior wall infarct  - likely 2/2 HF   - EF here on echo actually improved compared to echo at [place]    Problem/Plan - 5:  ·  Problem: Sarcoidosis.   ·  Plan:   - cont steroids       Shilpa Majano, AG-NP   David Blackburn DO [facility]  Cardiovascular Medicine  [address], [address]  Office: [phone]  Cell: [phone] DATE OF SERVICE: 04-28-22 @ 10:50    Patient is a 72y old  Male who presents with a chief complaint of sob, chest pain (27 Apr 2022 17:30)      INTERVAL HISTORY: Feels tired and slightly nauseous today.     REVIEW OF SYSTEMS:  CONSTITUTIONAL: No weakness  EYES/ENT: No visual changes;  No throat pain   NECK: No pain or stiffness  RESPIRATORY: No cough, wheezing; No shortness of breath  CARDIOVASCULAR: No chest pain or palpitations  GASTROINTESTINAL: No abdominal  pain. No nausea, vomiting, or hematemesis  GENITOURINARY: No dysuria, frequency or hematuria  NEUROLOGICAL: No stroke like symptoms  SKIN: No rashes    	  MEDICATIONS:  furosemide    Tablet 40 milliGRAM(s) Oral daily  metoprolol succinate ER 75 milliGRAM(s) Oral daily  tamsulosin 0.8 milliGRAM(s) Oral at bedtime        PHYSICAL EXAM:  T(C): 36.3 (04-28-22 @ 05:30), Max: 36.3 (04-27-22 @ 21:30)  HR: 62 (04-28-22 @ 05:30) (62 - 71)  BP: 122/70 (04-27-22 @ 21:30) (122/70 - 132/80)  RR: 18 (04-27-22 @ 21:30) (18 - 19)  SpO2: 98% (04-27-22 @ 21:30) (96% - 98%)  Wt(kg): --  I&O's Summary    27 Apr 2022 07:01  -  28 Apr 2022 07:00  --------------------------------------------------------  IN: 550 mL / OUT: 2050 mL / NET: -1500 mL          Appearance: In no distress	  HEENT:    PERRL, EOMI	  Cardiovascular:  S1 S2, No JVD  Respiratory: Lungs clear to auscultation	  Gastrointestinal:  Soft, Non-tender, + BS	  Vascularature:  No edema of LE  Psychiatric: Appropriate affect   Neuro: no acute focal deficits                               10.7   6.86  )-----------( 248      ( 28 Apr 2022 06:29 )             31.0     04-28    131<L>  |  92<L>  |  86<H>  ----------------------------<  334<H>  3.7   |  20<L>  |  2.99<H>    Ca    9.8      28 Apr 2022 06:27  Phos  4.5     04-27  Mg     2.1     04-27    TPro  6.8  /  Alb  3.9  /  TBili  0.5  /  DBili  x   /  AST  10  /  ALT  47<H>  /  AlkPhos  88  04-28        Labs personally reviewed      ASSESSMENT/PLAN: 	    72M c hx HTN, pulmonary sarcoidosis, asthma, CHF (?reported EF 45%) s/p AICD, DM2, ?CKD, "extra heart beat" on amiodarone, intermittent intractable hiccups, recent hospitalization at [place] (reportedly hospitalized for 12 days, d/c'd 4 days ago) for ?CP and CHF exacerbation, pw acute hypoxic respiratory failure, sepsis, [facility], 2/2 covid and poss CHF exacerbation, c/b steroid induced hyperglycemia    Problem/Plan - 1:  ·  Problem: Acute systolic HF  ·  Plan: HF with superimposed COVID  - much improved with diuresis  - BNP at discharge at [place] 1900, now >11.6K  - Continue with Lasix IV daily and transition to PO in next 48 hours  - Now on PO Lasix presenting as euvolemic    Problem/Plan - 2:  ·  Problem: Pneumonia due to COVID-19 virus.   ·  Plan: - Decadron 6 qd as per primary team  - unable to start remdesivir 2/2 renal failure  - Pulm recs appreciated    Problem/Plan - 3:  - PVCs/?NSVT at [place] and was started on Amio at the time  - discussed with OP cardio Stanislav Bell and Fanny, will hold given risk for lung toxicity    Problem/Plan - 4:  ·  Problem: Chest pain.   ·  Plan: Recent Stress test at [facility] with small inferior wall infarct  - likely 2/2 HF   - EF here on echo actually improved compared to echo at [place]    Problem/Plan - 5:  ·  Problem: Sarcoidosis.   ·  Plan:   - cont steroids       Updated OP cardiologist on 4/28      KAREY Bernard-CARMELLA Blackburn DO [facility]  Cardiovascular Medicine  [address] [place] [address], [address]  Office: [phone]  Cell: [phone]

## 2022-04-28 NOTE — PROGRESS NOTE ADULT - SUBJECTIVE AND OBJECTIVE BOX
Patient is a 72y old  Male who presents with a chief complaint of sob, chest pain (28 Apr 2022 10:49)      INTERVAL History of Present Illness/OVERNIGHT EVENTS: no WOODARD  no hypoxia  d/w Dr. Duval - monitor off decadron given steroid side effects    MEDICATIONS  (STANDING):  aspirin enteric coated 81 milliGRAM(s) Oral daily  chlorhexidine 2% Cloths 1 Application(s) Topical <User Schedule>  clonazePAM  Tablet 1 milliGRAM(s) Oral <User Schedule>  dextrose 5%. 1000 milliLiter(s) (50 mL/Hr) IV Continuous <Continuous>  dextrose 5%. 1000 milliLiter(s) (100 mL/Hr) IV Continuous <Continuous>  dextrose 50% Injectable 25 Gram(s) IV Push once  dextrose 50% Injectable 12.5 Gram(s) IV Push once  dextrose 50% Injectable 25 Gram(s) IV Push once  famotidine    Tablet 20 milliGRAM(s) Oral daily  furosemide    Tablet 40 milliGRAM(s) Oral daily  glucagon  Injectable 1 milliGRAM(s) IntraMuscular once  heparin   Injectable 5000 Unit(s) SubCutaneous three times a day  insulin glargine Injectable (LANTUS) 32 Unit(s) SubCutaneous every morning  insulin lispro (ADMELOG) corrective regimen sliding scale   SubCutaneous three times a day before meals  insulin lispro (ADMELOG) corrective regimen sliding scale   SubCutaneous at bedtime  insulin lispro Injectable (ADMELOG) 18 Unit(s) SubCutaneous three times a day before meals  metoprolol succinate ER 75 milliGRAM(s) Oral daily  pantoprazole    Tablet 40 milliGRAM(s) Oral daily  polyethylene glycol 3350 17 Gram(s) Oral daily  senna 2 Tablet(s) Oral at bedtime  tamsulosin 0.8 milliGRAM(s) Oral at bedtime    MEDICATIONS  (PRN):  dextrose Oral Gel 15 Gram(s) Oral once PRN Blood Glucose LESS THAN 70 milliGRAM(s)/deciliter      Allergies    penicillin (Faint)    Intolerances        REVIEW OF SYSTEMS:  Negative unless otherwise specified above.    Vital Signs Last 24 Hrs  T(C): 37.3 (28 Apr 2022 11:02), Max: 37.3 (28 Apr 2022 11:02)  T(F): 99.2 (28 Apr 2022 11:02), Max: 99.2 (28 Apr 2022 11:02)  HR: 69 (28 Apr 2022 11:02) (62 - 71)  BP: 126/69 (28 Apr 2022 11:02) (122/70 - 132/80)  BP(mean): --  RR: 18 (28 Apr 2022 11:02) (18 - 19)  SpO2: 99% (28 Apr 2022 11:02) (96% - 99%)        PHYSICAL EXAM:  GENERAL: No apparent distress, appears deconditioned  HEAD:  Atraumatic, Normocephalic  EYES: Conjunctiva and sclera clear, no discharge  ENMT: Moist mucous membranes, no nasal discharge  NECK: Supple, no JVD  CHEST/LUNG: Clear to auscultation bilaterally, no wheeze or rales  HEART: Regular rhythm, no rubs or gallops  ABDOMEN: Soft, Nontender, Nondistended; Bowel sounds present  EXTREMITIES:  No clubbing, cyanosis or edema  SKIN: No rash, no new discoloration  NERVOUS SYSTEM:  Alert & Oriented; Bilateral Lower extremity mobile, sensation to light touch intact      LABS:                        10.7   6.86  )-----------( 248      ( 28 Apr 2022 06:29 )             31.0     28 Apr 2022 06:27    131    |  92     |  86     ----------------------------<  334    3.7     |  20     |  2.99     Ca    9.8        28 Apr 2022 06:27    TPro  6.8    /  Alb  3.9    /  TBili  0.5    /  DBili  x      /  AST  10     /  ALT  47     /  AlkPhos  88     28 Apr 2022 06:27        CAPILLARY BLOOD GLUCOSE      POCT Blood Glucose.: 372 mg/dL (28 Apr 2022 11:38)  POCT Blood Glucose.: 343 mg/dL (28 Apr 2022 07:58)  POCT Blood Glucose.: 324 mg/dL (28 Apr 2022 06:02)  POCT Blood Glucose.: 435 mg/dL (28 Apr 2022 00:29)  POCT Blood Glucose.: 280 mg/dL (27 Apr 2022 21:43)  POCT Blood Glucose.: 351 mg/dL (27 Apr 2022 16:59)  POCT Blood Glucose.: 417 mg/dL (27 Apr 2022 16:57)      RADIOLOGY & ADDITIONAL TESTS:      Images reviewed personally    Consultant Notes Reviewed and Care Discussed with relevant Consultants.

## 2022-04-28 NOTE — PROGRESS NOTE ADULT - ASSESSMENT
72M c hx HTN, pulmonary sarcoidosis, asthma, CHF (?reported EF 45%) s/p AICD, DM2, ?CKD, "extra heart beat" on amiodarone, intermittent intractable hiccups, recent hospitalization at [facility] (reportedly hospitalized for 12 days, d/c'd 4 days ago) for ?CP and CHF exacerbation, pw SOB and CP. pt with IRWIN and chf along with hypercalcemia    1- IRWIN suspected  2- chf  3- covid 19   4- HTN   5- sarcoidosis   6- hypercalcemia  7- DM       irwin vs ckd in this pt.   unlikely diabetic nephropathy given no albuminuria   hypercalcemia can cause irwin. calcium improving  to have renal sono when able   continue lasix 40 mg po daily  calcium improving, alb 3.9  spep/ipep/bence jones pending  c3/c4 pending  strict I/O  off O2 supplementation  off decadron per patient request given hyperglycemia, and now on RA  insulin to control hyperglycemia   d/c baclofen given headaches and dizziness  orthostatic BP negative  strict I/O  trend creatinine and electrolytes daily 72M c hx HTN, pulmonary sarcoidosis, asthma, CHF (?reported EF 45%) s/p AICD, DM2, ?CKD, "extra heart beat" on amiodarone, intermittent intractable hiccups, recent hospitalization at [facility] (reportedly hospitalized for 12 days, d/c'd 4 days ago) for ?CP and CHF exacerbation, pw SOB and CP. pt with IRWIN and chf along with hypercalcemia    1- IRWIN suspected  2- chf  3- covid 19   4- HTN   5- sarcoidosis   6- hypercalcemia  7- DM       irwin vs ckd in this pt.   unlikely diabetic nephropathy given no albuminuria   to have renal sono when able   continue lasix 40 mg po daily  calcium improving, alb 3.9  spep/ipep/bence henning pending  c3/c4 pending  strict I/O  off O2 supplementation  off decadron per patient request given hyperglycemia, and now on RA  insulin to control hyperglycemia   d/c baclofen given headaches and dizziness  orthostatic BP negative  strict I/O  trend creatinine and electrolytes daily

## 2022-04-28 NOTE — PROGRESS NOTE ADULT - ASSESSMENT
ASSESSMENT:    72 year old gentleman, lifelong non-smoker, followed by Dr. Chico Cabrales of our practice for obstructive sleep apnea (non-compliant with CPAP), asthma and sarcoidosis requiring intermittent month-long courses of steroids for "flares" (with presumed cardiac involvement). He has a history of HTN, HLD, DM, non-ischemic cardiomyopathy now with an LVEF ~ 30% on ECHO 4/14/2022 s/p AICD implantation and CKD. The patient was admitted to [facility] on 4/13 shortly after arriving home from [facility] with shortness of breath, fatigue, weakness and chest pain. He was diuresed for pulmonary edema complicated by IRWIN. Subsequent IV fluid hydration was complicated by worsening heart failure requiring NIV for respiratory support. He continued to have APCs, PVCs and NSVT on telemetry and was started on amiodarone. Cardiac perfusion scan was negative for ischemia - fixed defects were felt to be related to an infiltrative process. CT scan -> scattered perilymphatic nodules increased in size from a prior study -> started on steroids/mepron by rheumatology (felt not to be a candidate for methotrexate due to CKD or infliximab or humira due to CHF). He was discharged on 4/23 feeling "well" on amiodarone, toprol XL and ASA - entresto and diuretics were held due to IRWIN - prednisone/mepron - toujeo/humalog (no oral hypoglycemics) - sodium bicarbonate - fluid restriction - plans for outpatient cardiac MRI or PET scan. The patient comes to the [facility] yesterday with shortness of breath with acute hypoxic respiratory failure requiring NIV therapy. He has a cough productive of scant sputum. He has chest congestion and wheeze. No fevers, chills or sweats. No chest pain/pressure or palpitations. No lower extremity swelling, PND or orthopnea. No loss of the sense of taste or smell. He has been found to have COVID-19 infection. His respiratory symptoms have improved following a brisk diuresis and he has now off BIPAP and on a nasal canula @ 6lpm    acute hypoxic respiratory failure -> multifactorial  1) COVID infection without evidence of COVID pneumonia  2) underlying pulmonary sarcoidosis without evidence of exacerbation  3) cardiac sarcoidosis with a non-ischemic cardiomyopathy - suspect pulmonary edema after discharge from [facility] off entresto and diuretics due to IRWIN  4) moderate persistent asthma with bronchospasm -> resolved - cardiac asthma versus asthma exacerbation    4/27 - unable to sleep due to chronic bedtime anxiety; feels well; no shortness of breath or hypoxemia on room air; no cough, sputum production, hemoptysis, chest congestion or wheeze; no fevers, chills or sweats; no chest pain/pressure or palpitations; good appetite; no fatigue, malaise or weakness; renal evaluation for CKD ongoing    4/28 - slept well with klonopin; nausea, lightheadedness, dizziness yesterday without orthostatic hypotension has improved - baclofen was recommended to be discontinued but is still being given; no shortness of breath or hypoxemia on room air; no cough, sputum production, hemoptysis, chest congestion or wheeze; no fevers, chills or sweats; no chest pain/pressure or palpitations; good appetite; no fatigue, malaise or weakness; renal evaluation for CKD ongoing    PLAN/RECOMMENDATIONS:    stable oxygenation on room air  CT chest to better evaluate the lung parenchyma -> "to my eye" -> bilateral perihilar architectural lung distortion with volume loss with a few patchy peripheral bilateral lower lobe opacities are without change - calcified mediastinal lymph nodes - superimposed small right and trace left pleural effusion - mild scattered areas of groundglass opacities c/w pulmonary edema  continue albuterol/atrovent nebs q6h  continue pulmicort 0.5mg nebs q12h - give after duoneb - rinse mouth after use  on baclofen for recurrent intractable hiccups - discontinue if nausea, lightheadedness, dizziness, etc continue  holding remdesivir due to renal dysfunction  decadron 6mg po daily x 10 days - glucose control on steroids (no indication for systemic steroids for sarcoidosis)  diligent DVT prophylaxis - SQ heparin  cardiology evaluation noted     cautious diurese as tolerated by renal function and hemodynamics     cardiac meds: toprol XL/ASA/lasix - would restart entresto if renal function remains stable - would observe off amiodarone given its potential for lung toxicity until evaluation by Dr. Irvin Bell of cardiology     recent ECHO -LVEF 30% - no significant valvular heart disease     repeat ECHO 4/26 -> moderate global left ventricular systolic dysfunction (LVEF 40%) - moderate global hypokinesis - moderate diastolic dysfunction (Stage II).     recent nuclear stress test -> negative for ischemia - fixed defects were felt to be related to an infiltrative process  GI prophylaxis - pepcid    Will follow with you. Plan of care discussed with the patient at bedside and nephrology. Discharge planning. Would defer an extensive evaluation of kidney disease to the outpatient setting    Johny Duval MD, [facility]  [phone]  Pulmonary Medicine     ASSESSMENT:    72 year old gentleman, lifelong non-smoker, followed by Dr. Chico Cabrales of our practice for obstructive sleep apnea (non-compliant with CPAP), asthma and sarcoidosis requiring intermittent month-long courses of steroids for "flares" (with presumed cardiac involvement). He has a history of HTN, HLD, DM, non-ischemic cardiomyopathy now with an LVEF ~ 30% on ECHO 4/14/2022 s/p AICD implantation and CKD. The patient was admitted to [facility] on 4/13 shortly after arriving home from [facility] with shortness of breath, fatigue, weakness and chest pain. He was diuresed for pulmonary edema complicated by IRWIN. Subsequent IV fluid hydration was complicated by worsening heart failure requiring NIV for respiratory support. He continued to have APCs, PVCs and NSVT on telemetry and was started on amiodarone. Cardiac perfusion scan was negative for ischemia - fixed defects were felt to be related to an infiltrative process. CT scan -> scattered perilymphatic nodules increased in size from a prior study -> started on steroids/mepron by rheumatology (felt not to be a candidate for methotrexate due to CKD or infliximab or humira due to CHF). He was discharged on 4/23 feeling "well" on amiodarone, toprol XL and ASA - entresto and diuretics were held due to IRWIN - prednisone/mepron - toujeo/humalog (no oral hypoglycemics) - sodium bicarbonate - fluid restriction - plans for outpatient cardiac MRI or PET scan. The patient comes to the [place] ER yesterday with shortness of breath with acute hypoxic respiratory failure requiring NIV therapy. He has a cough productive of scant sputum. He has chest congestion and wheeze. No fevers, chills or sweats. No chest pain/pressure or palpitations. No lower extremity swelling, PND or orthopnea. No loss of the sense of taste or smell. He has been found to have COVID-19 infection. His respiratory symptoms have improved following a brisk diuresis and he has now off BIPAP and on a nasal canula @ 6lpm    acute hypoxic respiratory failure -> multifactorial  1) COVID infection without evidence of COVID pneumonia  2) underlying pulmonary sarcoidosis without evidence of exacerbation  3) cardiac sarcoidosis with a non-ischemic cardiomyopathy - suspect pulmonary edema after discharge from [facility] off entresto and diuretics due to IRWIN  4) moderate persistent asthma with bronchospasm -> resolved - cardiac asthma versus asthma exacerbation    4/27 - unable to sleep due to chronic bedtime anxiety; feels well; no shortness of breath or hypoxemia on room air; no cough, sputum production, hemoptysis, chest congestion or wheeze; no fevers, chills or sweats; no chest pain/pressure or palpitations; good appetite; no fatigue, malaise or weakness; renal evaluation for CKD ongoing    4/28 - slept well with klonopin; nausea, lightheadedness, dizziness yesterday without orthostatic hypotension has improved - baclofen was recommended to be discontinued but is still being given; no shortness of breath or hypoxemia on room air; no cough, sputum production, hemoptysis, chest congestion or wheeze; no fevers, chills or sweats; no chest pain/pressure or palpitations; good appetite; no fatigue, malaise or weakness; renal evaluation for CKD ongoing    PLAN/RECOMMENDATIONS:    stable oxygenation on room air  CT chest to better evaluate the lung parenchyma -> "to my eye" -> bilateral perihilar architectural lung distortion with volume loss with a few patchy peripheral bilateral lower lobe opacities are without change - calcified mediastinal lymph nodes - superimposed small right and trace left pleural effusion - mild scattered areas of groundglass opacities c/w pulmonary edema  discontinue albuterol/atrovent nebs q6h  discontinue pulmicort 0.5mg nebs q12h - give after duoneb - rinse mouth after use  on baclofen for recurrent intractable hiccups - discontinue if nausea, lightheadedness, dizziness, etc continue  holding remdesivir due to renal dysfunction  decadron 6mg po daily x 10 days - glucose control on steroids (no indication for systemic steroids for sarcoidosis) -> no objection to stopping at this point with normal oxygenation on room air  diligent DVT prophylaxis - SQ heparin  cardiology evaluation noted     cautious diurese as tolerated by renal function and hemodynamics     cardiac meds: toprol XL/ASA/lasix - would restart entresto if renal function remains stable - would observe off amiodarone given its potential for lung toxicity until evaluation by Dr. Irvin Bell of cardiology     recent ECHO -LVEF 30% - no significant valvular heart disease     repeat ECHO 4/26 -> moderate global left ventricular systolic dysfunction (LVEF 40%) - moderate global hypokinesis - moderate diastolic dysfunction (Stage II).     recent nuclear stress test -> negative for ischemia - fixed defects were felt to be related to an infiltrative process  GI prophylaxis - pepcid    Will follow with you. Plan of care discussed with the patient at bedside and nephrology. Discharge planning. Would defer an extensive evaluation of kidney disease to the outpatient setting    Johny Duval MD, [facility]  [phone]  Pulmonary Medicine

## 2022-04-28 NOTE — PROGRESS NOTE ADULT - ASSESSMENT
72M c hx HTN, pulmonary sarcoidosis, asthma, CHF (?reported EF 45%) s/p AICD, DM2, ?CKD, "extra heart beat" on amiodarone, intermittent intractable hiccups, recent hospitalization at [facility] (reportedly hospitalized for 12 days, d/c'd 4 days ago) for ?CP and CHF exacerbation, pw SOB and CP.    At discharge, pt states he was breathing well and feeling good. pt states his course at [facility] was a nuc pharm stress test which was reportedly normal. Pt reportedly diuresed with lasix. Pt also started on prednisone 30 for poss sarcoidosis. Pt is not sure why he was started on amiodarone, but states he was told he had an "extra heart beat". Pt also says his Cr was 3.5 while at [facility]. Pt states he did not have covid while at [facility].  Yesterday morning, awoke with mild SOB, worse with lying flat, improved with standing up. After eating dinner and one hour prior to arrival to ED, pt reports acute worsening of his SOB, chest tightness, coughing. Pt reports good compliance with his home meds. Pt states he does not eat any extra salt. At baseline very functional, completes his own IADLs, drives. Denies fevers, chills. Pt denies any relieving or exacerbating factors.  (26 Apr 2022 04:39)    Endocrine consulted for steroid-induced hyperglycemia.    Poorly controlled T2DM with steroid-induced hyperglycemia  A1c 7.6, close to goal for this 73 y/o patient but could be improved  -Hold oral DM agents while inpatient  -now that decadon is discontinued would hold off on increasing insulin doses. Monitor on current doses for now as he will likely need higher doses as decadron effect will linger over the next 24-48 hours. Once glucose consistently <200 would empirically decrease insulin.   -Continue Lantus 32 units q24h. DO NOT HOLD IF NPO.  -Continue Admelog 18 units TID pre-meal. HOLD IF NPO.  -Once off steroids expect a reduction in insulin requirements  -Use moderate Admelog correction scale pre-meal  -Use moderate Admelog correction scale at bedtime  -Fingerstick BG before meals and bedtime  -Goal -180  -Carbohydrate consistent diet  Discharge plan:  -Likely to discharge patient home on basal insulin plus Januvia (at appropriate renal dose). Will d/c sulfonylurea given renal dysfunction. Final regimen pending clinical course.  -Recommend routine outpatient ophthalmology and endocrinology f/u. Can f/u with his home Endocrinologist.    Steroid-induced hyperglycemia  Has not been on steroids for his sarcoidosis for 1 year outpatient. Once off dexamethasone for covid, insulin requirements will drop.    HTN  -Outpatient goal BP <130/80. Management per primary team.        Bong Lancaster D.O  [phone]

## 2022-04-28 NOTE — PROGRESS NOTE ADULT - SUBJECTIVE AND OBJECTIVE BOX
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; pulmonary edema; pleural effusions; restrictive lung disease; obstructive sleep apnea; dyspnea; cough; hiccups    INTERVAL HISTORY: slept well with klonopin; nausea, lightheadedness, dizziness yesterday without orthostatic hypotension has improved - baclofen was recommended to be discontinued but is still being given; no shortness of breath or hypoxemia on room air; no cough, sputum production, hemoptysis, chest congestion or wheeze; no fevers, chills or sweats; no chest pain/pressure or palpitations; good appetite; no fatigue, malaise or weakness; renal evaluation for CKD ongoing    REVIEW OF SYSTEMS:  Constitutional: As per interval history  HEENT: Within normal limits  CV: As per interval history  Resp: As per interval history  GI: Within normal limits   : CKD  Musculoskeletal: Within normal limits  Skin: Within normal limits  Neurological: Within normal limits  Psychiatric: Within normal limits  Endocrine: Within normal limits  Hematologic/Lymphatic: Within normal limits  Allergic/Immunologic: Within normal limits    MEDICATIONS:     Pulmonary "  albuterol/ipratropium for Nebulization 3 milliLiter(s) Nebulizer every 6 hours  buDESOnide    Inhalation Suspension 0.5 milliGRAM(s) Inhalation every 12 hours    Anti-microbials:    Cardiovascular:  furosemide    Tablet 40 milliGRAM(s) Oral daily  metoprolol succinate ER 75 milliGRAM(s) Oral daily  tamsulosin 0.8 milliGRAM(s) Oral at bedtime    Other:  aspirin enteric coated 81 milliGRAM(s) Oral daily  baclofen 10 milliGRAM(s) Oral three times a day  chlorhexidine 2% Cloths 1 Application(s) Topical <User Schedule>  clonazePAM  Tablet 1 milliGRAM(s) Oral <User Schedule>  dexAMETHasone     Tablet 6 milliGRAM(s) Oral daily  dextrose 5%. 1000 milliLiter(s) IV Continuous <Continuous>  dextrose 5%. 1000 milliLiter(s) IV Continuous <Continuous>  dextrose 50% Injectable 25 Gram(s) IV Push once  dextrose 50% Injectable 12.5 Gram(s) IV Push once  dextrose 50% Injectable 25 Gram(s) IV Push once  famotidine    Tablet 20 milliGRAM(s) Oral daily  glucagon  Injectable 1 milliGRAM(s) IntraMuscular once  heparin   Injectable 5000 Unit(s) SubCutaneous three times a day  insulin glargine Injectable (LANTUS) 32 Unit(s) SubCutaneous every morning  insulin lispro (ADMELOG) corrective regimen sliding scale   SubCutaneous three times a day before meals  insulin lispro (ADMELOG) corrective regimen sliding scale   SubCutaneous at bedtime  insulin lispro Injectable (ADMELOG) 18 Unit(s) SubCutaneous three times a day before meals  pantoprazole    Tablet 40 milliGRAM(s) Oral daily  polyethylene glycol 3350 17 Gram(s) Oral daily  senna 2 Tablet(s) Oral at bedtime    MEDICATIONS  (PRN):  dextrose Oral Gel 15 Gram(s) Oral once PRN Blood Glucose LESS THAN 70 milliGRAM(s)/deciliter    OBJECTIVE:    Daily Weight in k.6 (2022 08:06)    POCT Blood Glucose.: 343 mg/dL (2022 07:58)  POCT Blood Glucose.: 324 mg/dL (2022 06:02)  POCT Blood Glucose.: 435 mg/dL (2022 00:29)  POCT Blood Glucose.: 280 mg/dL (2022 21:43)  POCT Blood Glucose.: 351 mg/dL (2022 16:59)  POCT Blood Glucose.: 417 mg/dL (2022 16:57)  POCT Blood Glucose.: 376 mg/dL (2022 12:18)      PHYSICAL EXAM:       ICU Vital Signs Last 24 Hrs  T(C): 36.3 (2022 05:30), Max: 36.3 (2022 21:30)  T(F): 97.4 (2022 05:30), Max: 97.4 (2022 21:30)  HR: 62 (2022 05:30) (62 - 71)  BP: 122/70 (2022 21:30) (122/70 - 132/80)  BP(mean): --  ABP: --  ABP(mean): --  RR: 18 (2022 21:30) (18 - 19)  SpO2: 98% (2022 21:30) (96% - 98%) on room air     General: Awake. Alert. Cooperative. No distress. Appears stated age.	  HEENT:  Atraumatic. Normocephalic. Anicteric. Normal oral mucosa. PERRL. EOMI.  Neck: Supple. Trachea midline. Thyroid without enlargement/tenderness/nodules. No carotid bruit. No JVD.	  Cardiovascular: Regular rate and rhythm. S1 S2 normal. No murmurs, rubs or gallops.  Respiratory: Respirations unlabored. Clear to auscultation and percussion bilaterally. No wheeze. No rales. No curvature.  Abdomen: Soft. Non-tender. Non-distended. No organomegaly. No masses. Normal bowel sounds.  Extremities: Warm to touch. No clubbing or cyanosis. No pedal edema.  Pulses: 2+ peripheral pulses all extremities.	  Skin: Normal skin color. No rashes or lesions. No ecchymoses. No cyanosis. Warm to touch.  Lymph Nodes: Cervical, supraclavicular and axillary nodes normal  Neurological: Motor and sensory examination equal and normal. A and O x 3  Psychiatry: Appropriate mood and affect.    LABS:                          10.7   6.86  )-----------( 248      ( 2022 06:29 )             31.0     CBC    WBC  6.86 <==, 6.90 <==, 11.23 <==, 15.90 <==    Hemoglobin  10.7 <<==, 9.9 <<==, 11.1 <<==, 13.0 <<==    Hematocrit  31.0 <==, 28.9 <==, 32.7 <==, 38.1 <==    Platelets  248 <==, 232 <==, 250 <==, 370 <==      131<L>  |  92<L>  |  86<H>  ----------------------------<  334<H>      3.7   |  20<L>  |  2.99<H>      LYTES    sodium  131 <==, 130 <==, 131 <==, 128 <==, 131 <==, 131 <==, 132 <==    potassium   3.7 <==, 4.0 <==, 4.0 <==, 4.6 <==, 4.8 <==, 5.2 <==, 4.9 <==    chloride  92 <==, 90 <==, 93 <==, 91 <==, 92 <==, 95 <==, 94 <==    carbon dioxide  20 <==, 21 <==, 23 <==, 20 <==, 21 <==, 19 <==, 21 <==    =============================================================================================  RENAL FUNCTION:    Creatinine:   2.99  <<==, 3.03  <<==, 3.24  <<==, 3.12  <<==, 3.03  <<==, 2.99  <<==, 2.97  <<==    BUN:   86 <==, 90 <==, 82 <==, 78 <==, 68 <==, 67 <==, 61 <==    ============================================================================================    calcium   9.8 <==, 10.0 <==, 10.0 <==, 10.6 <==, 10.8 <==, 10.3 <==, 10.8 <==    phos   4.5 <==, 3.7 <==, 3.6 <==    mag   2.1 <==, 2.0 <==, 2.0 <==    ============================================================================================  LFTs    AST:   10 <== , 14 <== , 19 <== , 27 <== , 29 <==     ALT:  47  <== , 58  <== , 78  <== , 91  <== , 97  <==     AP:  88  <=, 90  <=, 109  <=, 123  <=, 141  <=    Bili:  0.5  <=, 0.4  <=, 0.5  <=, 0.4  <=, 0.4  <=    PT/INR - ( 2022 06:29 )   PT: 12.3 sec;   INR: 1.06 ratio      PTT - ( 2022 06:29 )  PTT:23.9 sec    Venous Blood Gas:   @ 00:16  7.41/39/47/25/82.4  VBG Lactate: 2.1    Venous Blood Gas:   @ 21:56  7.34/49/22/26/30.8  VBG Lactate: 2.9    Procalcitonin, Serum: 0.10 ng/mL ( @ 21:56)    Serum Pro-Brain Natriuretic Peptide: 94195 pg/mL ( @ 06:28)  Serum Pro-Brain Natriuretic Peptide: 7795 pg/mL ( @ 21:56)    CARDIAC MARKERS ( 2022 06:28 )  CPK 32 U/L /CKMB x     /CKMB Units x        troponin 76 ng/L    CARDIAC MARKERS ( 2022 21:56 )  CPK 62 U/L /CKMB x     /CKMB Units 5.8 ng/mL    troponin 56 ng/L    < from: Transthoracic Echocardiogram (22 @ 15:12) >    Patient name: [facility], ANTE  YOB: 1950   Age: 72 (M)   MR#: [id number]  Study Date: 2022  Location: O/PSonographer: Vladimir Saleh RDCS  Study quality: Technically difficult  Referring Physician: Suhas Peters MD  Blood Pressure: 122/74 mmHg  Height: 183 cm  Weight: 81 kg  BSA: 2 m2  ------------------------------------------------------------------------  PROCEDURE: Transthoracic echocardiogram with 2-D, M-Mode  and complete spectral and color flow Doppler.  INDICATION: Heart failure, unspecified (I50.9)  ------------------------------------------------------------------------  Dimensions:    Normal Values:  LA:     4.0    2.0 - 4.0 cm  Ao:     3.3    2.0 - 3.8 cm  SEPTUM: 1.3    0.6 - 1.2 cm  PWT:    1.2    0.6 - 1.1 cm  LVIDd:  5.1    3.0 - 5.6 cm  LVIDs:  3.5    1.8 - 4.0 cm  Derived variables:  LVMI: 126 g/m2  RWT: 0.47  Fractional short: 31 %  EF (Tom Rule): 40 %Doppler Peak Velocity (m/sec):  AoV=1.3  ------------------------------------------------------------------------  Observations:  Mitral Valve: Normal mitral valve. Mild mitral  regurgitation.  Aortic Valve/Aorta: Normal trileaflet aortic valve. Peak  transaortic valve gradient equals 7 mm Hg. No aortic valve  regurgitation seen. Peak left ventricular outflow tract  gradient equals 3 mm Hg, mean gradient is equal to 2 mm Hg,  LVOT velocity time integral equals 18 cm.  Normal aortic root size. (Ao: 3.3 cm at the sinuses of  Valsalva).  Left Atrium: Normal left atrium.  LA volume index = 31  cc/m2.  Left Ventricle: Moderate global left ventricular systolic  dysfunction. LVEF calculated is 40%. Moderate global  hypokinesis. Normal left ventricular internal dimensions  and wall thicknesses. Moderate diastolic dysfunction (Stage  II).  Right Heart: Normalright atrium. Normal right ventricular  size and function. A device wire is noted in the right  heart. Normal tricuspid valve. Mild tricuspid  regurgitation. Normal pulmonic valve. Mild pulmonic  regurgitation.  Pericardium/Pleura: Small pericardial effusion.  Hemodynamic: Estimated right ventricular systolic pressure  equals 41 mm Hg, assuming right atrial pressure equals 3 mm  Hg, consistent with mild pulmonary hypertension. Color  Doppler demonstrates no evidence of a patent foramen ovale.  ------------------------------------------------------------------------  Conclusions:  1. Normal mitral valve.Mild mitral regurgitation.  2. Normal left atrium.  LA volume index = 31 cc/m2.  3. Moderate global left ventricular systolic dysfunction.  LVEF calculated is 40%. Moderate global hypokinesis.  Moderate diastolic dysfunction (Stage II).  4. Normal right atrium.Normal right ventricular size and  function. A device wire is noted in the right heart.Normal  tricuspid valve. Mild tricuspid regurgitation.Estimated  pulmonary artery systolic pressure equals 41  mm Hg,  assuming right atrial pressure equals 3 mm Hg, consistent  with mild pulmonary pressures.  *** Compared with echocardiogram of 3/15/2017, no  significant changes noted.  ------------------------------------------------------------------------  Confirmed on  2022 - 17:33:01 by Megan Resendiz M.D.  ------------------------------------------------------------------------  ---------------------------------------------------------------------------------------------------------------    MICROBIOLOGY:   Respiratory Viral Panel with COVID-19 by LAURA (22 @ 21:55)   Rapid RVP Result: Detected   SARS-CoV-2: Detected   This Respiratory Panel uses polymerase chain reaction (PCR) to detect for   adenovirus; coronavirus (HKU1, NL63, 229E, OC43); human metapneumovirus   (hMPV); human enterovirus/rhinovirus (Entero/RV); influenza A; influenza   A/H1; influenza A/H3; influenza A/H1-2009; influenza B; parainfluenza   viruses 1, 2, 3, 4; respiratory syncytial virus; Mycoplasma pneumoniae;   Chlamydophila pneumoniae; and SARS-CoV-2.     Urinalysis Basic - ( 2022 22:37 )    Color: Light Yellow / Appearance: Clear / S.018 / pH: x  Gluc: x / Ketone: Negative  / Bili: Negative / Urobili: Negative   Blood: x / Protein: Negative / Nitrite: Negative   Leuk Esterase: Negative / RBC: x / WBC x   Sq Epi: x / Non Sq Epi: x / Bacteria: x    Culture - Urine (22 @ 02:01)   Specimen Source: Clean Catch Clean Catch (Midstream)   Culture Results:   <10,000 CFU/mL Normal Urogenital Jyoti     Culture - Blood (22 @ 08:51)   Specimen Source: .Blood Blood-Venous   Culture Results:   No growth to date.     Culture - Blood (22 @ 08:51)   Specimen Source: .Blood Blood-Peripheral   Culture Results:   No growth to date.     RADIOLOGY:  [x ] Chest radiographs reviewed and interpreted by me    EXAM:  CT CHEST                          PROCEDURE DATE:  2022      FINDINGS:    AIRWAYS, LUNGS, PLEURA: Bilateral perihilar architectural lung distortion   with volume loss and few patchy peripheral bilateral lower lobe opacities   were present on 2/3/2021. Central left upper lobe consolidation and right   apical ground-glass opacities decreased compared to 2/3/2021.    Small bilateral pleural effusions, new on the right and unchanged on the   left.    MEDIASTINUM: Dilated left ventricle. No pericardial effusion. Thoracic   aorta normal caliber.  Calcified mediastinal nodes.    IMAGED ABDOMEN: Splenomegaly; 16.5 cm.    SOFT TISSUES: Unremarkable.    BONES: Unremarkable.    IMPRESSION:.    New small right pleural effusion and unchanged small left pleural   effusion.    Findings of thoracic sarcoidosis with interval improvement of left upper   lobe consolidation and right apical ground-glass opacities compared to prior.    TC OLSON MD; Attending Radiologist  This document has been electronically signed. 2022  1:00PM  ---------------------------------------------------------------------------------------------------------------  EXAM:  XR CHEST AP OR PA 1V                          PROCEDURE DATE:  2022      INTERPRETATION:  EXAMINATION: XR CHEST    CLINICAL INDICATION: Shortness of breath    TECHNIQUE: Single frontal, portable view of the chest was obtained.    COMPARISON: Chest x-ray 3/14/2017.    FINDINGS:  Left-sided dual-chamber AICD.  The heart is not accurately assessed in this AP projection.  Moderate pulmonary edema.  Small left pleural effusion.  No pneumothorax.    No acute bony abnormality.    IMPRESSION:  Moderate pulmonary edema.  Small left pleural effusion.    PRISCA RUTHERFORD MD; Resident Interventional Radiology  This document has been electronically signed.  TRISH ECHAVARRIA MD; Attending Radiologist  This document has been electronically signed. 2022  8:54AM  ---------------------------------------------------------------------------------------------------------------  EXAM:  CT CHEST      PROCEDURE DATE:  2021       FINDINGS:    AIRWAYS AND LUNGS: The central tracheobronchial tree is patent.  Bilateral upper lobe perihilar hilar consolidation with scattered perilymphatic nodules are overall slightly increased compared to the prior study. Nonvisualization prior right middle lobe1.6 cm nodule    MEDIASTINUM AND PLEURA: Calcified mediastinal lymph nodes. The visualized portion of the thyroid gland is unremarkable. Small left pleural effusion There is no pneumothorax.    HEART AND VESSELS: There is cardiomegaly.  There are atherosclerotic calcifications of the aorta and coronary arteries.  There is no pericardial effusion.    UPPER ABDOMEN: Images of the upper abdomen demonstrate no abnormality.    BONES AND SOFT TISSUES: The bones are unremarkable.  The soft tissues are unremarkable.    TUBES/LINES: None.    IMPRESSION:  Bilateral upper lobe perihilar hilar consolidation with scattered perilymphatic nodules are overall slightly increased compared to the prior study. Nonvisualization prior right middle lobe 1.6 cm nodule    DEANA OLPEZ MD; Attending Radiologist   This document has been electronically signed. Feb  3 2021  2:43PM  ---------------------------------------------------------------------------------------------------------------

## 2022-04-28 NOTE — PROGRESS NOTE ADULT - ASSESSMENT
72M c hx HTN, pulmonary sarcoidosis, asthma, CHF (?reported EF 45%) s/p AICD, DM2, ?CKD, "extra heart beat" on amiodarone, intermittent intractable hiccups, recent hospitalization at [place] (reportedly hospitalized for 12 days, d/c'd 4 days ago) for ?CP and CHF exacerbation, pw acute hypoxic respiratory failure, sepsis, IRWIN, 2/2 covid and poss CHF exacerbation, c/b steroid induced hyperglycemia

## 2022-04-29 ENCOUNTER — TRANSCRIPTION ENCOUNTER (OUTPATIENT)
Age: 72
End: 2022-04-29

## 2022-04-29 LAB
ALBUMIN SERPL ELPH-MCNC: 3.7 G/DL — SIGNIFICANT CHANGE UP (ref 3.3–5)
ALP SERPL-CCNC: 84 U/L — SIGNIFICANT CHANGE UP (ref 40–120)
ALT FLD-CCNC: 41 U/L — SIGNIFICANT CHANGE UP (ref 10–45)
ANION GAP SERPL CALC-SCNC: 15 MMOL/L — SIGNIFICANT CHANGE UP (ref 5–17)
AST SERPL-CCNC: 35 U/L — SIGNIFICANT CHANGE UP (ref 10–40)
BASOPHILS # BLD AUTO: 0.01 K/UL — SIGNIFICANT CHANGE UP (ref 0–0.2)
BASOPHILS NFR BLD AUTO: 0.1 % — SIGNIFICANT CHANGE UP (ref 0–2)
BILIRUB SERPL-MCNC: 0.4 MG/DL — SIGNIFICANT CHANGE UP (ref 0.2–1.2)
BUN SERPL-MCNC: 81 MG/DL — HIGH (ref 7–23)
C3 SERPL-MCNC: 121 MG/DL — SIGNIFICANT CHANGE UP (ref 81–157)
C4 SERPL-MCNC: 33 MG/DL — SIGNIFICANT CHANGE UP (ref 13–39)
CALCIUM SERPL-MCNC: 9.5 MG/DL — SIGNIFICANT CHANGE UP (ref 8.4–10.5)
CHLORIDE SERPL-SCNC: 94 MMOL/L — LOW (ref 96–108)
CO2 SERPL-SCNC: 19 MMOL/L — LOW (ref 22–31)
CREAT SERPL-MCNC: 3.1 MG/DL — HIGH (ref 0.5–1.3)
EGFR: 21 ML/MIN/1.73M2 — LOW
EOSINOPHIL # BLD AUTO: 0.07 K/UL — SIGNIFICANT CHANGE UP (ref 0–0.5)
EOSINOPHIL NFR BLD AUTO: 0.9 % — SIGNIFICANT CHANGE UP (ref 0–6)
GLUCOSE BLDC GLUCOMTR-MCNC: 119 MG/DL — HIGH (ref 70–99)
GLUCOSE BLDC GLUCOMTR-MCNC: 158 MG/DL — HIGH (ref 70–99)
GLUCOSE BLDC GLUCOMTR-MCNC: 200 MG/DL — HIGH (ref 70–99)
GLUCOSE BLDC GLUCOMTR-MCNC: 209 MG/DL — HIGH (ref 70–99)
GLUCOSE BLDC GLUCOMTR-MCNC: 224 MG/DL — HIGH (ref 70–99)
GLUCOSE BLDC GLUCOMTR-MCNC: 63 MG/DL — LOW (ref 70–99)
GLUCOSE BLDC GLUCOMTR-MCNC: 64 MG/DL — LOW (ref 70–99)
GLUCOSE SERPL-MCNC: 234 MG/DL — HIGH (ref 70–99)
HCT VFR BLD CALC: 36.3 % — LOW (ref 39–50)
HGB BLD-MCNC: 12.2 G/DL — LOW (ref 13–17)
IMM GRANULOCYTES NFR BLD AUTO: 1 % — SIGNIFICANT CHANGE UP (ref 0–1.5)
LYMPHOCYTES # BLD AUTO: 0.44 K/UL — LOW (ref 1–3.3)
LYMPHOCYTES # BLD AUTO: 5.5 % — LOW (ref 13–44)
MCHC RBC-ENTMCNC: 30 PG — SIGNIFICANT CHANGE UP (ref 27–34)
MCHC RBC-ENTMCNC: 33.6 GM/DL — SIGNIFICANT CHANGE UP (ref 32–36)
MCV RBC AUTO: 89.2 FL — SIGNIFICANT CHANGE UP (ref 80–100)
MONOCYTES # BLD AUTO: 0.93 K/UL — HIGH (ref 0–0.9)
MONOCYTES NFR BLD AUTO: 11.6 % — SIGNIFICANT CHANGE UP (ref 2–14)
NEUTROPHILS # BLD AUTO: 6.52 K/UL — SIGNIFICANT CHANGE UP (ref 1.8–7.4)
NEUTROPHILS NFR BLD AUTO: 80.9 % — HIGH (ref 43–77)
NRBC # BLD: 0 /100 WBCS — SIGNIFICANT CHANGE UP (ref 0–0)
PLATELET # BLD AUTO: 282 K/UL — SIGNIFICANT CHANGE UP (ref 150–400)
POTASSIUM SERPL-MCNC: 4.7 MMOL/L — SIGNIFICANT CHANGE UP (ref 3.5–5.3)
POTASSIUM SERPL-SCNC: 4.7 MMOL/L — SIGNIFICANT CHANGE UP (ref 3.5–5.3)
PROT SERPL-MCNC: 6.9 G/DL — SIGNIFICANT CHANGE UP (ref 6–8.3)
RBC # BLD: 4.07 M/UL — LOW (ref 4.2–5.8)
RBC # FLD: 13.7 % — SIGNIFICANT CHANGE UP (ref 10.3–14.5)
SODIUM SERPL-SCNC: 128 MMOL/L — LOW (ref 135–145)
WBC # BLD: 8.05 K/UL — SIGNIFICANT CHANGE UP (ref 3.8–10.5)
WBC # FLD AUTO: 8.05 K/UL — SIGNIFICANT CHANGE UP (ref 3.8–10.5)

## 2022-04-29 PROCEDURE — 99232 SBSQ HOSP IP/OBS MODERATE 35: CPT

## 2022-04-29 PROCEDURE — 76775 US EXAM ABDO BACK WALL LIM: CPT | Mod: 26

## 2022-04-29 PROCEDURE — 99233 SBSQ HOSP IP/OBS HIGH 50: CPT

## 2022-04-29 RX ORDER — DEXTROSE 50 % IN WATER 50 %
15 SYRINGE (ML) INTRAVENOUS ONCE
Refills: 0 | Status: COMPLETED | OUTPATIENT
Start: 2022-04-29 | End: 2022-04-29

## 2022-04-29 RX ORDER — INSULIN LISPRO 100/ML
16 VIAL (ML) SUBCUTANEOUS
Refills: 0 | Status: DISCONTINUED | OUTPATIENT
Start: 2022-04-29 | End: 2022-04-29

## 2022-04-29 RX ORDER — INSULIN LISPRO 100/ML
VIAL (ML) SUBCUTANEOUS
Refills: 0 | Status: DISCONTINUED | OUTPATIENT
Start: 2022-04-29 | End: 2022-04-30

## 2022-04-29 RX ORDER — INSULIN GLARGINE 100 [IU]/ML
30 INJECTION, SOLUTION SUBCUTANEOUS EVERY MORNING
Refills: 0 | Status: DISCONTINUED | OUTPATIENT
Start: 2022-04-29 | End: 2022-04-29

## 2022-04-29 RX ORDER — INSULIN LISPRO 100/ML
10 VIAL (ML) SUBCUTANEOUS
Refills: 0 | Status: DISCONTINUED | OUTPATIENT
Start: 2022-04-30 | End: 2022-04-30

## 2022-04-29 RX ORDER — INSULIN LISPRO 100/ML
VIAL (ML) SUBCUTANEOUS AT BEDTIME
Refills: 0 | Status: DISCONTINUED | OUTPATIENT
Start: 2022-04-29 | End: 2022-04-30

## 2022-04-29 RX ORDER — INSULIN GLARGINE 100 [IU]/ML
25 INJECTION, SOLUTION SUBCUTANEOUS EVERY MORNING
Refills: 0 | Status: DISCONTINUED | OUTPATIENT
Start: 2022-04-29 | End: 2022-04-30

## 2022-04-29 RX ADMIN — INSULIN GLARGINE 32 UNIT(S): 100 INJECTION, SOLUTION SUBCUTANEOUS at 08:37

## 2022-04-29 RX ADMIN — Medication 75 MILLIGRAM(S): at 06:32

## 2022-04-29 RX ADMIN — TAMSULOSIN HYDROCHLORIDE 0.8 MILLIGRAM(S): 0.4 CAPSULE ORAL at 22:16

## 2022-04-29 RX ADMIN — SENNA PLUS 2 TABLET(S): 8.6 TABLET ORAL at 22:16

## 2022-04-29 RX ADMIN — HEPARIN SODIUM 5000 UNIT(S): 5000 INJECTION INTRAVENOUS; SUBCUTANEOUS at 22:16

## 2022-04-29 RX ADMIN — CHLORHEXIDINE GLUCONATE 1 APPLICATION(S): 213 SOLUTION TOPICAL at 06:34

## 2022-04-29 RX ADMIN — PANTOPRAZOLE SODIUM 40 MILLIGRAM(S): 20 TABLET, DELAYED RELEASE ORAL at 11:19

## 2022-04-29 RX ADMIN — Medication 81 MILLIGRAM(S): at 11:14

## 2022-04-29 RX ADMIN — Medication 1 MILLIGRAM(S): at 22:16

## 2022-04-29 RX ADMIN — Medication 2: at 13:11

## 2022-04-29 RX ADMIN — Medication 18 UNIT(S): at 13:10

## 2022-04-29 RX ADMIN — FAMOTIDINE 20 MILLIGRAM(S): 10 INJECTION INTRAVENOUS at 11:14

## 2022-04-29 RX ADMIN — Medication 18 UNIT(S): at 08:09

## 2022-04-29 RX ADMIN — HEPARIN SODIUM 5000 UNIT(S): 5000 INJECTION INTRAVENOUS; SUBCUTANEOUS at 06:32

## 2022-04-29 RX ADMIN — Medication 4: at 08:10

## 2022-04-29 RX ADMIN — POLYETHYLENE GLYCOL 3350 17 GRAM(S): 17 POWDER, FOR SOLUTION ORAL at 11:15

## 2022-04-29 RX ADMIN — HEPARIN SODIUM 5000 UNIT(S): 5000 INJECTION INTRAVENOUS; SUBCUTANEOUS at 13:11

## 2022-04-29 RX ADMIN — Medication 15 GRAM(S): at 17:03

## 2022-04-29 RX ADMIN — Medication 40 MILLIGRAM(S): at 06:32

## 2022-04-29 NOTE — DISCHARGE NOTE NURSING/CASE MANAGEMENT/SOCIAL WORK - NSDCFUADDAPPT_GEN_ALL_CORE_FT
APPTS ARE READY TO BE MADE: [X ] YES    Best Family or Patient Contact (if needed):    Additional Information about above appointments (if needed):    1:   2:   3:     Other comments or requests:

## 2022-04-29 NOTE — PROGRESS NOTE ADULT - ASSESSMENT
72M c hx HTN, pulmonary sarcoidosis, asthma, CHF (?reported EF 45%) s/p AICD, DM2, ?CKD, "extra heart beat" on amiodarone, intermittent intractable hiccups, recent hospitalization at [place] (reportedly hospitalized for 12 days, d/c'd 4 days ago) for ?CP and CHF exacerbation, pw acute hypoxic respiratory failure, sepsis, IRWIN, 2/2 covid and poss CHF exacerbation, c/b steroid induced hyperglycemia

## 2022-04-29 NOTE — PROVIDER CONTACT NOTE (OTHER) - DATE AND TIME:
26-Apr-2022 06:17
26-Apr-2022 06:45
29-Apr-2022 13:33
27-Apr-2022 18:20
26-Apr-2022 08:36
29-Apr-2022 17:00
28-Apr-2022 06:28

## 2022-04-29 NOTE — DISCHARGE NOTE PROVIDER - PROVIDER TOKENS
PROVIDER:[TOKEN:[[id number]],FOLLOWUP:[1 week]],PROVIDER:[[id number]],FOLLOWUP:[2 weeks]],PROVIDER:[TOKEN:[[id number]],FOLLOWUP:[1 week]],FREE:[LAST:[Your primray care physician],PHONE:[(   )    -],FAX:[(   )    -],FOLLOWUP:[1 week]] PROVIDER:[TOKEN:[[id number]],FOLLOWUP:[1 week]],PROVIDER:[TOKEN:[[id number]],FOLLOWUP:[2 weeks]],PROVIDER:[TOKEN:[[id number]],FOLLOWUP:[1 week]],FREE:[LAST:[Your primray care physician],PHONE:[(   )    -],FAX:[(   )    -],FOLLOWUP:[1 week]],PROVIDER:[TOKEN:[[id number]],FOLLOWUP:[1 week]]

## 2022-04-29 NOTE — DISCHARGE NOTE PROVIDER - NSDCCPCAREPLAN_GEN_ALL_CORE_FT
PRINCIPAL DISCHARGE DIAGNOSIS  Diagnosis: Acute decompensated heart failure  Assessment and Plan of Treatment: Acute Systolic HF and diastolic heart failure   Echo - EF 40%. Moderate global hypokinesis. Moderate diastolic dysfunction (Stage II) - cardiac sarcoidosis with a non-ischemic cardiomyopathy  Treated with Lasix 40 intravenously with improvement of shortness of breath now changed to lasix tablets  You required Oxygen via BIPAP machine then changed to nasal canula which is now titrated to off  Breathing has improved   Weigh yourself daily.  If you gain 3lbs in 3 days, or 5lbs in a week call your Health Care Provider.  Do not eat or drink foods containing more than 2000mg of salt (sodium) in your diet every day.  Call your Health Care Provider if you have any swelling or increased swelling in your feet, ankles, and/or stomach.  Take all of your medication as directed.  If you become dizzy call your Health Care Provider.        SECONDARY DISCHARGE DIAGNOSES  Diagnosis: Acute respiratory failure with hypoxia  Assessment and Plan of Treatment: Admitted with Acute respiratory failure with hypoxia secondary to Covid, decompensated systolic and diastolic heart failure and Sarcoidosis      Diagnosis: 2019 novel coronavirus disease (COVID-19)  Assessment and Plan of Treatment: You were tested positive for COVID  CT shows chronic lung dz without evidence of COVID pneumonia.  Started Decadron 4/26 - 4/28 but discontinued as it was causing your blood sugars to go too high  You have tested POSITIVE for the novel coronavirus (COVID-19). You no longer require hospitalization.  Follow up with your doctor within 2-3 days.   - Return to the ED for any new or worsening symptoms including but not limited to difficulty breathing, severe weakness, confusion, etc.  - Take Tylenol (Acetaminophen) 650mg as needed for pain or fever  - Stay well hydrated.   - Avoid contact with anybody who is sick OR at risk populations including elderly, young, pregnant patients, immune compromised people  - Wash hands frequently in warm soapy water for at least 20 seconds   - Quarantine yourself at home for at least 10 days total  - If you would like an appointment to be tested for COVID-19 (coronavirus) at one of the testing sites, contact [phone] for an appointment.    Viral Respiratory Infection  A viral respiratory infection is an illness that affects parts of the body used for breathing, like the lungs, nose, and throat. It is caused by a germ called a virus. Symptoms can include runny nose, coughing, sneezing, fatigue, body aches, sore throat, fever, or headache. Over the counter medicine can be used to manage the symptoms but the infection typically goes away on its own in 5 to 10 days.   SEEK IMMEDIATE MEDICAL CARE IF YOU HAVE ANY OF THE FOLLOWING SYMPTOMS: shortness of breath, chest pain, fever over 10 days, or lightheadedness/dizziness.      Diagnosis: Acute on chronic kidney failure  Assessment and Plan of Treatment: Ypur creatinine is around 3.1  Avoid taking (NSAIDs) - (ex: Ibuprofen, Advil, Celebrex, Naprosyn)  Avoid taking any nephrotoxic agents (can harm kidneys) - Intravenous contrast for diagnostic testing, combination cold medications.  Have all medications adjusted for your renal function by your Health Care Provider.  Blood pressure control is important.  Take all medication as prescribed.  Follow up with kidney doctor - Dr. Croft in 1 week      Diagnosis: Diabetes mellitus with hyperglycemia  Assessment and Plan of Treatment: HgA1C this admission - 7.6  Make sure you get your HgA1c checked every three months.  If you take oral diabetes medications, check your blood glucose two times a day.  If you take insulin, check your blood glucose before meals and at bedtime.  It's important not to skip any meals.  Keep a log of your blood glucose results and always take it with you to your doctor appointments.  Keep a list of your current medications including injectables and over the counter medications and bring this medication list with you to all your doctor appointments.  If you have not seen your ophthalmologist this year call for appointment.  Check your feet daily for redness, sores, or openings. Do not self treat. If no improvement in two days call your primary care physician for an appointment.  Low blood sugar (hypoglycemia) is a blood sugar below 70mg/dl. Check your blood sugar if you feel signs/symptoms of hypoglycemia. If your blood sugar is below 70 take 15 grams of carbohydrates (ex 4 oz of apple juice, 3-4 glucose tablets, or 4-6 oz of regular soda) wait 15 minutes and repeat blood sugar to make sure it comes up above 70.  If your blood sugar is above 70 and you are due for a meal, have a meal.  If you are not due for a meal have a snack.  This snack helps keeps your blood sugar at a safe range.       PRINCIPAL DISCHARGE DIAGNOSIS  Diagnosis: Acute decompensated heart failure  Assessment and Plan of Treatment: Acute Systolic HF and diastolic heart failure   Echo - EF 40%. Moderate global hypokinesis. Moderate diastolic dysfunction (Stage II) - cardiac sarcoidosis with a non-ischemic cardiomyopathy  Treated with Lasix 40 intravenously with improvement of shortness of breath now changed to lasix tablets  You required Oxygen via BIPAP machine then changed to nasal canula which is now titrated to off.   Breathing has improved   Weigh yourself daily.  If you gain 3lbs in 3 days, or 5lbs in a week call your Health Care Provider.  Do not eat or drink foods containing more than 2000mg of salt (sodium) in your diet every day.  Call your Health Care Provider if you have any swelling or increased swelling in your feet, ankles, and/or stomach.  Take all of your medication as directed.  If you become dizzy call your Health Care Provider.        SECONDARY DISCHARGE DIAGNOSES  Diagnosis: Acute respiratory failure with hypoxia  Assessment and Plan of Treatment: Admitted with Acute respiratory failure with hypoxia secondary to Covid, decompensated systolic and diastolic heart failure and Sarcoidosis      Diagnosis: 2019 novel coronavirus disease (COVID-19)  Assessment and Plan of Treatment: You were tested positive for COVID  CT shows chronic lung dz without evidence of COVID pneumonia.  Started Decadron 4/26 - 4/28 but discontinued as it was causing your blood sugars to go too high  You have tested POSITIVE for the novel coronavirus (COVID-19). You no longer require hospitalization.  Follow up with your doctor within 2-3 days.   - Return to the ED for any new or worsening symptoms including but not limited to difficulty breathing, severe weakness, confusion, etc.  - Take Tylenol (Acetaminophen) 650mg as needed for pain or fever  - Stay well hydrated.   - Avoid contact with anybody who is sick OR at risk populations including elderly, young, pregnant patients, immune compromised people  - Wash hands frequently in warm soapy water for at least 20 seconds   - Quarantine yourself at home for at least 5 days total      Diagnosis: Acute on chronic kidney failure  Assessment and Plan of Treatment: Ypur creatinine is around 3.27  Avoid taking (NSAIDs) - (ex: Ibuprofen, Advil, Celebrex, Naprosyn)  Avoid taking any nephrotoxic agents (can harm kidneys) - Intravenous contrast for diagnostic testing, combination cold medications.  Have all medications adjusted for your renal function by your Health Care Provider.  Blood pressure control is important.  Take all medication as prescribed.  Follow up with kidney doctor - Dr. Croft in 1 week      Diagnosis: Diabetes mellitus with hyperglycemia  Assessment and Plan of Treatment: HgA1C this admission - 7.6  Make sure you get your HgA1c checked every three months.  If you take oral diabetes medications, check your blood glucose two times a day.  If you take insulin, check your blood glucose before meals and at bedtime.  It's important not to skip any meals.  Keep a log of your blood glucose results and always take it with you to your doctor appointments.  Keep a list of your current medications including injectables and over the counter medications and bring this medication list with you to all your doctor appointments.  If you have not seen your ophthalmologist this year call for appointment.  Check your feet daily for redness, sores, or openings. Do not self treat. If no improvement in two days call your primary care physician for an appointment.  Low blood sugar (hypoglycemia) is a blood sugar below 70mg/dl. Check your blood sugar if you feel signs/symptoms of hypoglycemia. If your blood sugar is below 70 take 15 grams of carbohydrates (ex 4 oz of apple juice, 3-4 glucose tablets, or 4-6 oz of regular soda) wait 15 minutes and repeat blood sugar to make sure it comes up above 70.  If your blood sugar is above 70 and you are due for a meal, have a meal.  If you are not due for a meal have a snack.  This snack helps keeps your blood sugar at a safe range.      Diagnosis: Chest pain  Assessment and Plan of Treatment: Recent Stress test at [facility] with small inferior wall infarct  Likely secondary to heart failure   You had Echocardiogram here-actually improved compared to echo at [facility]  - EF 40%. Moderate global hypokinesis. Moderate diastolic dysfunction (Stage II)      Diagnosis: PVCs (premature ventricular contractions)  Assessment and Plan of Treatment: PVCs/?NSVT at [facility] and was started on Amio at the time  Per discussion with OP cardio Dr. Bell and Fanny, will hold given risk for lung toxicity  please follow up with your cardiologist       Diagnosis: Sarcoidosis  Assessment and Plan of Treatment: CT shows underlying pulmonary sarcoidosis without evidence of exacerbation  Pulmonary consulted   You reports minimal benefit from steroids in past  Severe hyperglycemia related to steroid use - now off steroids.      Diagnosis: Hypercalcemia  Assessment and Plan of Treatment: Now resolved     PRINCIPAL DISCHARGE DIAGNOSIS  Diagnosis: Acute decompensated heart failure  Assessment and Plan of Treatment: Acute Systolic HF and diastolic heart failure   Echo - EF 40%. Moderate global hypokinesis. Moderate diastolic dysfunction (Stage II) - cardiac sarcoidosis with a non-ischemic cardiomyopathy  Treated with Lasix 40 intravenously with improvement of shortness of breath now changed to lasix tablets  You required Oxygen via BIPAP machine then changed to nasal canula which is now titrated to off.   Breathing has improved   Weigh yourself daily.  If you gain 3lbs in 3 days, or 5lbs in a week call your Health Care Provider.  Do not eat or drink foods containing more than 2000mg of salt (sodium) in your diet every day.  Call your Health Care Provider if you have any swelling or increased swelling in your feet, ankles, and/or stomach.  Take all of your medication as directed.  If you become dizzy call your Health Care Provider.        SECONDARY DISCHARGE DIAGNOSES  Diagnosis: Acute respiratory failure with hypoxia  Assessment and Plan of Treatment: Admitted with Acute respiratory failure with hypoxia secondary to Covid, decompensated systolic and diastolic heart failure and Sarcoidosis      Diagnosis: 2019 novel coronavirus disease (COVID-19)  Assessment and Plan of Treatment: You were tested positive for COVID  CT shows chronic lung dz without evidence of COVID pneumonia.  Started Decadron 4/26 - 4/28 but discontinued as it was causing your blood sugars to go too high  You have tested POSITIVE for the novel coronavirus (COVID-19). You no longer require hospitalization.  Follow up with your doctor within 2-3 days.   - Return to the ED for any new or worsening symptoms including but not limited to difficulty breathing, severe weakness, confusion, etc.  - Take Tylenol (Acetaminophen) 650mg as needed for pain or fever  - Stay well hydrated.   - Avoid contact with anybody who is sick OR at risk populations including elderly, young, pregnant patients, immune compromised people  - Wash hands frequently in warm soapy water for at least 20 seconds   - Quarantine yourself at home for at least 5 days total      Diagnosis: Acute on chronic kidney failure  Assessment and Plan of Treatment: Ypur creatinine is around 3.27  Avoid taking (NSAIDs) - (ex: Ibuprofen, Advil, Celebrex, Naprosyn)  Avoid taking any nephrotoxic agents (can harm kidneys) - Intravenous contrast for diagnostic testing, combination cold medications.  Have all medications adjusted for your renal function by your Health Care Provider.  Blood pressure control is important.  Take all medication as prescribed.  Follow up with kidney doctor - Dr. Croft in 1 week      Diagnosis: Diabetes mellitus with hyperglycemia  Assessment and Plan of Treatment: You were evaluated by endocrine for uncontrolled DM wiht hyperglycemia and episode of hypoglycemia  Please continue Lantus/ Toujeo 24 units qHS and Januvia 25 mg daily.   Stop sulfonylurea given renal dysfunction.  Please follow up with ophthalmology and endocrinology.   HgA1C this admission - 7.6  Make sure you get your HgA1c checked every three months.  If you take oral diabetes medications, check your blood glucose two times a day.  If you take insulin, check your blood glucose before meals and at bedtime.  It's important not to skip any meals.  Keep a log of your blood glucose results and always take it with you to your doctor appointments.  Keep a list of your current medications including injectables and over the counter medications and bring this medication list with you to all your doctor appointments.  If you have not seen your ophthalmologist this year call for appointment.  Check your feet daily for redness, sores, or openings. Do not self treat. If no improvement in two days call your primary care physician for an appointment.  Low blood sugar (hypoglycemia) is a blood sugar below 70mg/dl. Check your blood sugar if you feel signs/symptoms of hypoglycemia. If your blood sugar is below 70 take 15 grams of carbohydrates (ex 4 oz of apple juice, 3-4 glucose tablets, or 4-6 oz of regular soda) wait 15 minutes and repeat blood sugar to make sure it comes up above 70.  If your blood sugar is above 70 and you are due for a meal, have a meal.  If you are not due for a meal have a snack.  This snack helps keeps your blood sugar at a safe range.      Diagnosis: Chest pain  Assessment and Plan of Treatment: Recent Stress test at [facility] with small inferior wall infarct  Likely secondary to heart failure   You had Echocardiogram here-actually improved compared to echo at [place]  - EF 40%. Moderate global hypokinesis. Moderate diastolic dysfunction (Stage II)      Diagnosis: PVCs (premature ventricular contractions)  Assessment and Plan of Treatment: PVCs/?NSVT at [place] and was started on Amio at the time  Per discussion with OP cardio Dr. Bell and Fanny, will hold given risk for lung toxicity  please follow up with your cardiologist       Diagnosis: Sarcoidosis  Assessment and Plan of Treatment: CT shows underlying pulmonary sarcoidosis without evidence of exacerbation  Pulmonary consulted   You reports minimal benefit from steroids in past  Severe hyperglycemia related to steroid use - now off steroids.      Diagnosis: Hypercalcemia  Assessment and Plan of Treatment: Now resolved

## 2022-04-29 NOTE — PROGRESS NOTE ADULT - SUBJECTIVE AND OBJECTIVE BOX
DATE OF SERVICE: 04-29-22 @ 12:42    Patient is a 72y old  Male who presents with a chief complaint of sob, chest pain (28 Apr 2022 17:00)      INTERVAL HISTORY: Feels ok. Weak.    REVIEW OF SYSTEMS:  CONSTITUTIONAL: No weakness  EYES/ENT: No visual changes;  No throat pain   NECK: No pain or stiffness  RESPIRATORY: No cough, wheezing; No shortness of breath  CARDIOVASCULAR: No chest pain or palpitations  GASTROINTESTINAL: No abdominal  pain. No nausea, vomiting, or hematemesis  GENITOURINARY: No dysuria, frequency or hematuria  NEUROLOGICAL: No stroke like symptoms  SKIN: No rashes    TELEMETRY Personally reviewed: A-Paced 60-70, PVCs  	  MEDICATIONS:  furosemide    Tablet 40 milliGRAM(s) Oral daily  metoprolol succinate ER 75 milliGRAM(s) Oral daily  tamsulosin 0.8 milliGRAM(s) Oral at bedtime        PHYSICAL EXAM:  T(C): 36.4 (04-29-22 @ 11:21), Max: 36.5 (04-29-22 @ 01:02)  HR: 60 (04-29-22 @ 11:21) (58 - 66)  BP: 133/82 (04-29-22 @ 11:21) (121/72 - 133/82)  RR: 18 (04-29-22 @ 11:21) (18 - 18)  SpO2: 99% (04-29-22 @ 11:21) (97% - 100%)  Wt(kg): --  I&O's Summary    28 Apr 2022 07:01  -  29 Apr 2022 07:00  --------------------------------------------------------  IN: 940 mL / OUT: 1450 mL / NET: -510 mL    29 Apr 2022 07:01  -  29 Apr 2022 12:42  --------------------------------------------------------  IN: 690 mL / OUT: 800 mL / NET: -110 mL          Appearance: In no distress	  HEENT:    PERRL, EOMI	  Cardiovascular:  S1 S2, No JVD  Respiratory: Lungs clear to auscultation	  Gastrointestinal:  Soft, Non-tender, + BS	  Vascularature:  No edema of LE  Psychiatric: Appropriate affect   Neuro: no acute focal deficits                               12.2   8.05  )-----------( 282      ( 29 Apr 2022 10:55 )             36.3     04-29    128<L>  |  94<L>  |  81<H>  ----------------------------<  234<H>  4.7   |  19<L>  |  3.10<H>    Ca    9.5      29 Apr 2022 07:23    TPro  6.9  /  Alb  3.7  /  TBili  0.4  /  DBili  x   /  AST  35  /  ALT  41  /  AlkPhos  84  04-29        Labs personally reviewed      ASSESSMENT/PLAN: 	      72M c hx HTN, pulmonary sarcoidosis, asthma, CHF (?reported EF 45%) s/p AICD, DM2, ?CKD, "extra heart beat" on amiodarone, intermittent intractable hiccups, recent hospitalization at [facility] (reportedly hospitalized for 12 days, d/c'd 4 days ago) for ?CP and CHF exacerbation, pw acute hypoxic respiratory failure, sepsis, [facility], 2/2 covid and poss CHF exacerbation, c/b steroid induced hyperglycemia    Problem/Plan - 1:  ·  Problem: Acute systolic HF  ·  Plan: HF with superimposed COVID  - much improved with diuresis  - BNP at discharge at [facility] 1900, now >11.6K  - Continue with Lasix IV daily and transition to PO in next 48 hours  - Now on PO Lasix presenting as euvolemic    Problem/Plan - 2:  ·  Problem: Pneumonia due to COVID-19 virus.   ·  Plan: - Decadron 6 qd as per primary team  - unable to start remdesivir 2/2 renal failure  - Pulm recs appreciated    Problem/Plan - 3:  - PVCs/?NSVT at [facility] and was started on Amio at the time  - discussed with OP cardio Stanislav Bell and Fanny, will hold given risk for lung toxicity    Problem/Plan - 4:  ·  Problem: Chest pain.   ·  Plan: Recent Stress test at [facility] with small inferior wall infarct  - likely 2/2 HF   - EF here on echo actually improved compared to echo at [facility]    Problem/Plan - 5:  ·  Problem: Sarcoidosis.   ·  Plan:   - cont steroids       Updated OP cardiologist on 4/28      REAL Bernard DO [facility]  Cardiovascular Medicine  [address] [place] [address], [address]  Office: [phone]  Cell: [phone]

## 2022-04-29 NOTE — DISCHARGE NOTE PROVIDER - NSDCFUADDINST_GEN_ALL_CORE_FT
Make appointments to follow up with your out patient physicians.  Bring all discharge paperwork including discharge medication list to your follow up appointments.  Please follow up with your primary care physician in one week for repeat blood work ( Cr and electrolytes )   Make appointments to follow up with your out patient physicians.  Bring all discharge paperwork including discharge medication list to your follow up appointments.

## 2022-04-29 NOTE — PROGRESS NOTE ADULT - ASSESSMENT
ASSESSMENT:    72 year old gentleman, lifelong non-smoker, followed by Dr. Chico Cabrales of our practice for obstructive sleep apnea (non-compliant with CPAP), asthma and sarcoidosis requiring intermittent month-long courses of steroids for "flares" (with presumed cardiac involvement). He has a history of HTN, HLD, DM, non-ischemic cardiomyopathy now with an LVEF ~ 30% on ECHO 4/14/2022 s/p AICD implantation and CKD. The patient was admitted to [facility] on 4/13 shortly after arriving home from [facility] with shortness of breath, fatigue, weakness and chest pain. He was diuresed for pulmonary edema complicated by IRWIN. Subsequent IV fluid hydration was complicated by worsening heart failure requiring NIV for respiratory support. He continued to have APCs, PVCs and NSVT on telemetry and was started on amiodarone. Cardiac perfusion scan was negative for ischemia - fixed defects were felt to be related to an infiltrative process. CT scan -> scattered perilymphatic nodules increased in size from a prior study -> started on steroids/mepron by rheumatology (felt not to be a candidate for methotrexate due to CKD or infliximab or humira due to CHF). He was discharged on 4/23 feeling "well" on amiodarone, toprol XL and ASA - entresto and diuretics were held due to IRWIN - prednisone/mepron - toujeo/humalog (no oral hypoglycemics) - sodium bicarbonate - fluid restriction - plans for outpatient cardiac MRI or PET scan. The patient comes to the [facility] yesterday with shortness of breath with acute hypoxic respiratory failure requiring NIV therapy. He has a cough productive of scant sputum. He has chest congestion and wheeze. No fevers, chills or sweats. No chest pain/pressure or palpitations. No lower extremity swelling, PND or orthopnea. No loss of the sense of taste or smell. He has been found to have COVID-19 infection. His respiratory symptoms have improved following a brisk diuresis and he has now off BIPAP and on a nasal canula @ 6lpm    acute hypoxic respiratory failure -> multifactorial  1) COVID infection without evidence of COVID pneumonia  2) underlying pulmonary sarcoidosis without evidence of exacerbation  3) cardiac sarcoidosis with a non-ischemic cardiomyopathy - no evidence of pulmonary edema off entresto  4) moderate persistent asthma with severe bronchospasm -> resolved - cardiac asthma versus asthma exacerbation    4/27 - unable to sleep due to chronic bedtime anxiety; feels well; no shortness of breath or hypoxemia on room air; no cough, sputum production, hemoptysis, chest congestion or wheeze; no fevers, chills or sweats; no chest pain/pressure or palpitations; good appetite; no fatigue, malaise or weakness; renal evaluation for CKD ongoing    4/28 - slept well with klonopin; nausea, lightheadedness, dizziness yesterday without orthostatic hypotension has improved - baclofen was recommended to be discontinued but is still being given; no shortness of breath or hypoxemia on room air; no cough, sputum production, hemoptysis, chest congestion or wheeze; no fevers, chills or sweats; no chest pain/pressure or palpitations; good appetite; no fatigue, malaise or weakness; renal evaluation for CKD ongoing    PLAN/RECOMMENDATIONS:    stable oxygenation on room air  CT chest -> "to my eye" -> bilateral perihilar architectural lung distortion with volume loss with a few patchy peripheral bilateral lower lobe opacities are without change - calcified mediastinal lymph nodes - superimposed small right and trace left pleural effusion  discontinue albuterol/atrovent nebs q6h  discontinue pulmicort 0.5mg nebs q12h - give after duoneb - rinse mouth after use  discharge on Advair 500/50mcg/inhlation - 1 inhalation 2 times daily  holding baclofen for recurrent intractable hiccups - will restart in the outpatient setting if needed  holding remdesivir due to renal dysfunction  steroids discontinued in the setting of normal oxygenation on room air and no evidence of COVID pneumonia - no indication for systemic steroids for sarcoidosis  diligent DVT prophylaxis - SQ heparin  cardiology evaluation noted     cautious diurese as tolerated by renal function and hemodynamics     cardiac meds: toprol XL/ASA/lasix - would restart entresto if renal function remains stable - would observe off amiodarone given its potential for lung toxicity until evaluation by Dr. Irvin Bell of cardiology     recent ECHO -LVEF 30% - no significant valvular heart disease     repeat ECHO 4/26 -> moderate global left ventricular systolic dysfunction (LVEF 40%) - moderate global hypokinesis - moderate diastolic dysfunction (Stage II).     recent nuclear stress test -> negative for ischemia - fixed defects were felt to be related to an infiltrative process  needs better glucose control - endocrine follow-up  renal function remains abnormal - renal follow-up  GI prophylaxis - pepcid    Will follow with you. Plan of care discussed with the patient at bedside. No pulmonary objection to discharge. Needs renal and endocrine optimization prior to discharge. The patient will follow-up with Dr. Chico Cabrales in our office as planned.    Johny Duval MD, [facility]  [phone]  Pulmonary Medicine

## 2022-04-29 NOTE — PROGRESS NOTE ADULT - SUBJECTIVE AND OBJECTIVE BOX
[facility] - PROGRESS NOTE    CHIEF COMPLAINT: COVID infection; acute hypoxic respiratory failure; asthma exacerbation; pulmonary sarcoidosis; non-ischemic cardiomyopathy due to cardiac sarcoidosis; pulmonary edema; pleural effusions; restrictive lung disease; obstructive sleep apnea; dyspnea; cough; hiccups    INTERVAL HISTORY: slept well with klonopin; nausea, lightheadedness, dizziness yesterday without orthostatic hypotension has resolved; no hiccups off baclofen; no shortness of breath or hypoxemia on room air; no cough, sputum production, hemoptysis, chest congestion or wheeze; no fevers, chills or sweats; no chest pain/pressure or palpitations; good appetite; no fatigue, malaise or weakness; renal evaluation for CKD ongoing; hyperglycemia now off steroids    REVIEW OF SYSTEMS:  Constitutional: As per interval history  HEENT: Within normal limits  CV: As per interval history  Resp: As per interval history  GI: Within normal limits   : CKD  Musculoskeletal: Within normal limits  Skin: Within normal limits  Neurological: Within normal limits  Psychiatric: Within normal limits  Endocrine: Within normal limits  Hematologic/Lymphatic: Within normal limits  Allergic/Immunologic: Within normal limits    MEDICATIONS:     Pulmonary "    Anti-microbials:    Cardiovascular:  furosemide    Tablet 40 milliGRAM(s) Oral daily  metoprolol succinate ER 75 milliGRAM(s) Oral daily  tamsulosin 0.8 milliGRAM(s) Oral at bedtime    Other:  aspirin enteric coated 81 milliGRAM(s) Oral daily  chlorhexidine 2% Cloths 1 Application(s) Topical <User Schedule>  clonazePAM  Tablet 1 milliGRAM(s) Oral <User Schedule>  dextrose 5%. 1000 milliLiter(s) IV Continuous <Continuous>  dextrose 5%. 1000 milliLiter(s) IV Continuous <Continuous>  dextrose 50% Injectable 25 Gram(s) IV Push once  dextrose 50% Injectable 12.5 Gram(s) IV Push once  dextrose 50% Injectable 25 Gram(s) IV Push once  famotidine    Tablet 20 milliGRAM(s) Oral daily  glucagon  Injectable 1 milliGRAM(s) IntraMuscular once  heparin   Injectable 5000 Unit(s) SubCutaneous three times a day  insulin glargine Injectable (LANTUS) 32 Unit(s) SubCutaneous every morning  insulin lispro (ADMELOG) corrective regimen sliding scale   SubCutaneous three times a day before meals  insulin lispro (ADMELOG) corrective regimen sliding scale   SubCutaneous at bedtime  insulin lispro Injectable (ADMELOG) 18 Unit(s) SubCutaneous three times a day before meals  pantoprazole    Tablet 40 milliGRAM(s) Oral daily  polyethylene glycol 3350 17 Gram(s) Oral daily  senna 2 Tablet(s) Oral at bedtime    MEDICATIONS  (PRN):  dextrose Oral Gel 15 Gram(s) Oral once PRN Blood Glucose LESS THAN 70 milliGRAM(s)/deciliter    OBJECTIVE:    Daily Weight in k.7 (2022 07:50)    POCT Blood Glucose.: 224 mg/dL (2022 07:22)  POCT Blood Glucose.: 242 mg/dL (2022 23:19)  POCT Blood Glucose.: 331 mg/dL (2022 21:38)  POCT Blood Glucose.: 222 mg/dL (2022 16:43)  POCT Blood Glucose.: 372 mg/dL (2022 11:38)      PHYSICAL EXAM:       ICU Vital Signs Last 24 Hrs  T(C): 36.3 (2022 04:29), Max: 37.3 (2022 11:02)  T(F): 97.3 (2022 04:29), Max: 99.2 (2022 11:02)  HR: 66 (2022 04:29) (58 - 69)  BP: 128/73 (2022 04:29) (121/72 - 132/69)  BP(mean): --  ABP: --  ABP(mean): --  RR: 18 (2022 04:29) (18 - 18)  SpO2: 100% (2022 04:29) (97% - 100%) on room air    General: Awake. Alert. Cooperative. No distress. Appears stated age. Sitting on the side of the bed eating breakfast  HEENT:  Atraumatic. Normocephalic. Anicteric. Normal oral mucosa. PERRL. EOMI.  Neck: Supple. Trachea midline. Thyroid without enlargement/tenderness/nodules. No carotid bruit. No JVD.	  Cardiovascular: Regular rate and rhythm. S1 S2 normal. No murmurs, rubs or gallops.  Respiratory: Respirations unlabored. Clear to auscultation and percussion bilaterally. No wheeze. No rales. No curvature.  Abdomen: Soft. Non-tender. Non-distended. No organomegaly. No masses. Normal bowel sounds.  Extremities: Warm to touch. No clubbing or cyanosis. No pedal edema.  Pulses: 2+ peripheral pulses all extremities.	  Skin: Normal skin color. No rashes or lesions. No ecchymoses. No cyanosis. Warm to touch.  Lymph Nodes: Cervical, supraclavicular and axillary nodes normal  Neurological: Motor and sensory examination equal and normal. A and O x 3  Psychiatry: Appropriate mood and affect.    LABS:                          10.7   6.86  )-----------( 248      ( 2022 06:29 )             31.0     CBC    WBC  6.86 <==, 6.90 <==, 11.23 <==, 15.90 <==    Hemoglobin  10.7 <<==, 9.9 <<==, 11.1 <<==, 13.0 <<==    Hematocrit  31.0 <==, 28.9 <==, 32.7 <==, 38.1 <==    Platelets  248 <==, 232 <==, 250 <==, 370 <==      128<L>  |  94<L>  |  81<H>  ----------------------------<  234<H>    04-29  4.7   |  19<L>  |  3.10<H>      LYTES    sodium  128 <==, 131 <==, 130 <==, 131 <==, 128 <==, 131 <==, 131 <==    potassium   4.7 <==, 3.7 <==, 4.0 <==, 4.0 <==, 4.6 <==, 4.8 <==, 5.2 <==    chloride  94 <==, 92 <==, 90 <==, 93 <==, 91 <==, 92 <==, 95 <==    carbon dioxide  19 <==, 20 <==, 21 <==, 23 <==, 20 <==, 21 <==, 19 <==    =============================================================================================  RENAL FUNCTION:    Creatinine:   3.10  <<==, 2.99  <<==, 3.03  <<==, 3.24  <<==, 3.12  <<==, 3.03  <<==, 2.99  <<==    BUN:   81 <==, 86 <==, 90 <==, 82 <==, 78 <==, 68 <==, 67 <==    ============================================================================================    calcium   9.5 <==, 9.8 <==, 10.0 <==, 10.0 <==, 10.6 <==, 10.8 <==, 10.3 <==    phos   4.5 <==, 3.7 <==, 3.6 <==    mag   2.1 <==, 2.0 <==, 2.0 <==    ============================================================================================  LFTs    AST:   35 <== , 10 <== , 14 <== , 19 <== , 27 <== , 29 <==     ALT:  41  <== , 47  <== , 58  <== , 78  <== , 91  <== , 97  <==     AP:  84  <=, 88  <=, 90  <=, 109  <=, 123  <=, 141  <=    Bili:  0.4  <=, 0.5  <=, 0.4  <=, 0.5  <=, 0.4  <=, 0.4  <=    PT/INR - ( 2022 06:29 )   PT: 12.3 sec;   INR: 1.06 ratio      Venous Blood Gas:   @ 00:16  7.41/39/47/25/82.4  VBG Lactate: 2.1    Venous Blood Gas:   @ 21:56  7.34/49/22/26/30.8  VBG Lactate: 2.9    Procalcitonin, Serum: 0.10 ng/mL ( @ 21:56)    Serum Pro-Brain Natriuretic Peptide: 58797 pg/mL ( @ 06:28)  Serum Pro-Brain Natriuretic Peptide: 7795 pg/mL ( @ 21:56)    CARDIAC MARKERS ( 2022 06:28 )  CPK 32 U/L /CKMB x     /CKMB Units x        troponin 76 ng/L    CARDIAC MARKERS ( 2022 21:56 )  CPK 62 U/L /CKMB x     /CKMB Units 5.8 ng/mL    troponin 56 ng/L    < from: Transthoracic Echocardiogram (22 @ 15:12) >    Patient name: [facility], ANTE  YOB: 1950   Age: 72 (M)   MR#: [id number]  Study Date: 2022  Location: O/PSonographer: Vladimir Saleh RDCS  Study quality: Technically difficult  Referring Physician: Suhas Peters MD  Blood Pressure: 122/74 mmHg  Height: 183 cm  Weight: 81 kg  BSA: 2 m2  ------------------------------------------------------------------------  PROCEDURE: Transthoracic echocardiogram with 2-D, M-Mode  and complete spectral and color flow Doppler.  INDICATION: Heart failure, unspecified (I50.9)  ------------------------------------------------------------------------  Dimensions:    Normal Values:  LA:     4.0    2.0 - 4.0 cm  Ao:     3.3    2.0 - 3.8 cm  SEPTUM: 1.3    0.6 - 1.2 cm  PWT:    1.2    0.6 - 1.1 cm  LVIDd:  5.1    3.0 - 5.6 cm  LVIDs:  3.5    1.8 - 4.0 cm  Derived variables:  LVMI: 126 g/m2  RWT: 0.47  Fractional short: 31 %  EF (Tom Rule): 40 %Doppler Peak Velocity (m/sec):  AoV=1.3  ------------------------------------------------------------------------  Observations:  Mitral Valve: Normal mitral valve. Mild mitral  regurgitation.  Aortic Valve/Aorta: Normal trileaflet aortic valve. Peak  transaortic valve gradient equals 7 mm Hg. No aortic valve  regurgitation seen. Peak left ventricular outflow tract  gradient equals 3 mm Hg, mean gradient is equal to 2 mm Hg,  LVOT velocity time integral equals 18 cm.  Normal aortic root size. (Ao: 3.3 cm at the sinuses of  Valsalva).  Left Atrium: Normal left atrium.  LA volume index = 31  cc/m2.  Left Ventricle: Moderate global left ventricular systolic  dysfunction. LVEF calculated is 40%. Moderate global  hypokinesis. Normal left ventricular internal dimensions  and wall thicknesses. Moderate diastolic dysfunction (Stage  II).  Right Heart: Normalright atrium. Normal right ventricular  size and function. A device wire is noted in the right  heart. Normal tricuspid valve. Mild tricuspid  regurgitation. Normal pulmonic valve. Mild pulmonic  regurgitation.  Pericardium/Pleura: Small pericardial effusion.  Hemodynamic: Estimated right ventricular systolic pressure  equals 41 mm Hg, assuming right atrial pressure equals 3 mm  Hg, consistent with mild pulmonary hypertension. Color  Doppler demonstrates no evidence of a patent foramen ovale.  ------------------------------------------------------------------------  Conclusions:  1. Normal mitral valve.Mild mitral regurgitation.  2. Normal left atrium.  LA volume index = 31 cc/m2.  3. Moderate global left ventricular systolic dysfunction.  LVEF calculated is 40%. Moderate global hypokinesis.  Moderate diastolic dysfunction (Stage II).  4. Normal right atrium.Normal right ventricular size and  function. A device wire is noted in the right heart.Normal  tricuspid valve. Mild tricuspid regurgitation.Estimated  pulmonary artery systolic pressure equals 41  mm Hg,  assuming right atrial pressure equals 3 mm Hg, consistent  with mild pulmonary pressures.  *** Compared with echocardiogram of 3/15/2017, no  significant changes noted.  ------------------------------------------------------------------------  Confirmed on  2022 - 17:33:01 by Megan Resendiz M.D.  ------------------------------------------------------------------------  ---------------------------------------------------------------------------------------------------------------    MICROBIOLOGY:   Respiratory Viral Panel with COVID-19 by LAURA (22 @ 21:55)   Rapid RVP Result: Detected   SARS-CoV-2: Detected   This Respiratory Panel uses polymerase chain reaction (PCR) to detect for   adenovirus; coronavirus (HKU1, NL63, 229E, OC43); human metapneumovirus   (hMPV); human enterovirus/rhinovirus (Entero/RV); influenza A; influenza   A/H1; influenza A/H3; influenza A/H1-2009; influenza B; parainfluenza   viruses 1, 2, 3, 4; respiratory syncytial virus; Mycoplasma pneumoniae;   Chlamydophila pneumoniae; and SARS-CoV-2.     Urinalysis Basic - ( 2022 22:37 )    Color: Light Yellow / Appearance: Clear / S.018 / pH: x  Gluc: x / Ketone: Negative  / Bili: Negative / Urobili: Negative   Blood: x / Protein: Negative / Nitrite: Negative   Leuk Esterase: Negative / RBC: x / WBC x   Sq Epi: x / Non Sq Epi: x / Bacteria: x    Culture - Urine (22 @ 02:01)   Specimen Source: Clean Catch Clean Catch (Midstream)   Culture Results:   <10,000 CFU/mL Normal Urogenital Jyoti     Culture - Blood (22 @ 08:51)   Specimen Source: .Blood Blood-Venous   Culture Results:   No growth to date.     Culture - Blood (22 @ 08:51)   Specimen Source: .Blood Blood-Peripheral   Culture Results:   No growth to date.     RADIOLOGY:  [x ] Chest radiographs reviewed and interpreted by me    EXAM:  CT CHEST                          PROCEDURE DATE:  2022      FINDINGS:    AIRWAYS, LUNGS, PLEURA: Bilateral perihilar architectural lung distortion   with volume loss and few patchy peripheral bilateral lower lobe opacities   were present on 2/3/2021. Central left upper lobe consolidation and right   apical ground-glass opacities decreased compared to 2/3/2021.    Small bilateral pleural effusions, new on the right and unchanged on the   left.    MEDIASTINUM: Dilated left ventricle. No pericardial effusion. Thoracic   aorta normal caliber.  Calcified mediastinal nodes.    IMAGED ABDOMEN: Splenomegaly; 16.5 cm.    SOFT TISSUES: Unremarkable.    BONES: Unremarkable.    IMPRESSION:.    New small right pleural effusion and unchanged small left pleural   effusion.    Findings of thoracic sarcoidosis with interval improvement of left upper   lobe consolidation and right apical ground-glass opacities compared to prior.    TC OLSON MD; Attending Radiologist  This document has been electronically signed. 2022  1:00PM  ---------------------------------------------------------------------------------------------------------------  EXAM:  XR CHEST AP OR PA 1V                          PROCEDURE DATE:  2022      INTERPRETATION:  EXAMINATION: XR CHEST    CLINICAL INDICATION: Shortness of breath    TECHNIQUE: Single frontal, portable view of the chest was obtained.    COMPARISON: Chest x-ray 3/14/2017.    FINDINGS:  Left-sided dual-chamber AICD.  The heart is not accurately assessed in this AP projection.  Moderate pulmonary edema.  Small left pleural effusion.  No pneumothorax.    No acute bony abnormality.    IMPRESSION:  Moderate pulmonary edema.  Small left pleural effusion.    PRISCA RUTHERFORD MD; Resident Interventional Radiology  This document has been electronically signed.  TRISH ECHAVARRIA MD; Attending Radiologist  This document has been electronically signed. 2022  8:54AM  ---------------------------------------------------------------------------------------------------------------  EXAM:  CT CHEST      PROCEDURE DATE:  2021       FINDINGS:    AIRWAYS AND LUNGS: The central tracheobronchial tree is patent.  Bilateral upper lobe perihilar hilar consolidation with scattered perilymphatic nodules are overall slightly increased compared to the prior study. Nonvisualization prior right middle lobe1.6 cm nodule    MEDIASTINUM AND PLEURA: Calcified mediastinal lymph nodes. The visualized portion of the thyroid gland is unremarkable. Small left pleural effusion There is no pneumothorax.    HEART AND VESSELS: There is cardiomegaly.  There are atherosclerotic calcifications of the aorta and coronary arteries.  There is no pericardial effusion.    UPPER ABDOMEN: Images of the upper abdomen demonstrate no abnormality.    BONES AND SOFT TISSUES: The bones are unremarkable.  The soft tissues are unremarkable.    TUBES/LINES: None.    IMPRESSION:  Bilateral upper lobe perihilar hilar consolidation with scattered perilymphatic nodules are overall slightly increased compared to the prior study. Nonvisualization prior right middle lobe 1.6 cm nodule    DEANA LOPEZ MD; Attending Radiologist   This document has been electronically signed. Feb  3 2021  2:43PM  ---------------------------------------------------------------------------------------------------------------

## 2022-04-29 NOTE — PROVIDER CONTACT NOTE (OTHER) - ACTION/TREATMENT ORDERED:
Provider made aware insulin cutdown was made. oral gel given and offered with snacks. FS rechecked it went up to 116. Patient denies any discomfort, Will continue to monitor

## 2022-04-29 NOTE — DISCHARGE NOTE PROVIDER - CARE PROVIDER_API CALL
JAMIE PRADO  Cardiovascular Diseases  [address]  [place] [address]  Phone: [phone]  Fax: [phone]  Follow Up Time: 1 week    Chico Cabrales)  Critical Care Medicine; Internal Medicine; Pulmonary Disease  [address], [address]  [place] [address]  Phone: [phone]  Fax: [phone]  Follow Up Time: 2 weeks    Minerva Croft ()  Nephrology  [address], [address]  [place] [address]  Phone: [phone]  Fax: [phone]  Follow Up Time: 1 week    Your primray care physician,   Phone: (   )    -  Fax: (   )    -  Follow Up Time: 1 week   JAMIE PRADO  Cardiovascular Diseases  [address]  [place] [address]  Phone: [phone]  Fax: [phone]  Follow Up Time: 1 week    Chico Cabrales)  Critical Care Medicine; Internal Medicine; Pulmonary Disease  [facility] [address], [address]  [place] [address]  Phone: [phone]  Fax: [phone]  Follow Up Time: 2 weeks    Minerva Croft (DO)  Nephrology  [address], [address]  [place] [address]  Phone: [phone]  Fax: [phone]  Follow Up Time: 1 week    Your primray care physician,   Phone: (   )    -  Fax: (   )    -  Follow Up Time: 1 week    Kendra Mcginnis (DO)  EndocrinologyMetabDiabetes; Internal Medicine  [address] [place] [address]  Phone: [phone]  Fax: [phone]  Follow Up Time: 1 week

## 2022-04-29 NOTE — DISCHARGE NOTE PROVIDER - NSDCMRMEDTOKEN_GEN_ALL_CORE_FT
Advair Diskus 500 mcg-50 mcg inhalation powder: 1 puff(s) inhaled 2 times a day  amiodarone 400 mg oral tablet: 1 tab(s) orally once a day  Aspirin Enteric Coated 81 mg oral delayed release tablet: 1 tab(s) orally once a day  HumaLOG KwikPen 100 units/mL injectable solution: 10 unit(s) injectable 3 times a day (before meals)  Pepcid 20 mg oral tablet: 1 tab(s) orally 2 times a day  predniSONE 10 mg oral tablet: 3 tab(s) orally once a day  ProAir HFA 90 mcg/inh inhalation aerosol: 2 puff(s) inhaled 4 times a day  sodium bicarbonate 650 mg oral tablet: 2 tab(s) orally 2 times a day for 15 days (start 4/23/22)  tamsulosin 0.4 mg oral capsule: 2 cap(s) orally once a day  Toprol-XL 50 mg oral tablet, extended release: 1.5 tab(s) orally once a day  Toujeo SoloStar 300 units/mL subcutaneous solution: 18 unit(s) subcutaneous once a day (at bedtime)  Vitamin D3 25 mcg (1000 intl units) oral tablet: 1 tab(s) orally once a day   Advair Diskus 500 mcg-50 mcg inhalation powder: 1 puff(s) inhaled 2 times a day  Aspirin Enteric Coated 81 mg oral delayed release tablet: 1 tab(s) orally once a day  Pepcid 20 mg oral tablet: 1 tab(s) orally 2 times a day  predniSONE 10 mg oral tablet: 3 tab(s) orally once a day  ProAir HFA 90 mcg/inh inhalation aerosol: 2 puff(s) inhaled 4 times a day  sodium bicarbonate 650 mg oral tablet: 2 tab(s) orally 2 times a day for 15 days (start 4/23/22)  tamsulosin 0.4 mg oral capsule: 2 cap(s) orally once a day  Toprol-XL 50 mg oral tablet, extended release: 1.5 tab(s) orally once a day  Toujeo SoloStar 300 units/mL subcutaneous solution: 24 unit(s) subcutaneous once a day (at bedtime)  Vitamin D3 25 mcg (1000 intl units) oral tablet: 1 tab(s) orally once a day   Advair Diskus 500 mcg-50 mcg inhalation powder: 1 puff(s) inhaled 2 times a day  Aspirin Enteric Coated 81 mg oral delayed release tablet: 1 tab(s) orally once a day  furosemide 40 mg oral tablet: 1 tab(s) orally once a day  Januvia 25 mg oral tablet: 1 tab(s) orally once a day   Pepcid 20 mg oral tablet: 1 tab(s) orally 2 times a day  ProAir HFA 90 mcg/inh inhalation aerosol: 2 puff(s) inhaled 4 times a day, As Needed   tamsulosin 0.4 mg oral capsule: 2 cap(s) orally once a day  Toprol-XL 50 mg oral tablet, extended release: 1.5 tab(s) orally once a day  Toujeo SoloStar 300 units/mL subcutaneous solution: 24 unit(s) subcutaneous once a day (at bedtime)  Vitamin D3 25 mcg (1000 intl units) oral tablet: 1 tab(s) orally once a day

## 2022-04-29 NOTE — CHART NOTE - NSCHARTNOTEFT_GEN_A_CORE
Episode of Hypoglycemia - pt Asymptomatic    POCT Blood Glucose.: 63 mg/dL (29 Apr 2022 16:50)  POCT Blood Glucose.: 64 mg/dL (29 Apr 2022 16:40)  POCT Blood Glucose.: 200 mg/dL (29 Apr 2022 13:08)  POCT Blood Glucose.: 209 mg/dL (29 Apr 2022 11:51)  POCT Blood Glucose.: 224 mg/dL (29 Apr 2022 07:22)  POCT Blood Glucose.: 242 mg/dL (28 Apr 2022 23:19)  POCT Blood Glucose.: 331 mg/dL (28 Apr 2022 21:38)    Discussed with Dr. Cole - decreased Lantus to 25 units and Admelog to 10 units with meals from 4/30 BF and to hold off premeal Admelog for dinner tonight    [id number]

## 2022-04-29 NOTE — DISCHARGE NOTE NURSING/CASE MANAGEMENT/SOCIAL WORK - PATIENT PORTAL LINK FT
You can access the FollowMyHealth Patient Portal offered by [facility] by registering at the following website: [email or web address]. By joining [employer]’s FollowMyHealth portal, you will also be able to view your health information using other applications (apps) compatible with our system.

## 2022-04-29 NOTE — PROGRESS NOTE ADULT - ASSESSMENT
72M c hx HTN, pulmonary sarcoidosis, asthma, CHF (?reported EF 45%) s/p AICD, DM2, ?CKD, "extra heart beat" on amiodarone, intermittent intractable hiccups, recent hospitalization at [place] (reportedly hospitalized for 12 days, d/c'd 4 days ago) for ?CP and CHF exacerbation, pw SOB and CP. pt with IRWIN and chf along with hypercalcemia    1- IRWIN suspected  2- chf  3- covid 19   4- HTN   5- sarcoidosis   6- hyponatremia   7- DM       irwin vs ckd in this pt.   unlikely diabetic nephropathy given no albuminuria   to have renal sono when able   continue lasix 40 mg po daily  na is low to decrease po fluid intake   spep/ipep/bence jones pending  c3/c4 in rang e  strict I/O  off O2 supplementation  off decadron per patient request given hyperglycemia, and now on RA  insulin to control hyperglycemia

## 2022-04-29 NOTE — PROGRESS NOTE ADULT - SUBJECTIVE AND OBJECTIVE BOX
[facility]   [phone]  RENAL FOLLOW UP NOTE  --------------------------------------------------------------------------------  Chief Complaint:    24 hour events/subjective:    seen earlier states feels better   no sob   + po intake soups and increase po fluid intake ?     PAST HISTORY  --------------------------------------------------------------------------------  No significant changes to PMH, PSH, FHx, SHx, unless otherwise noted    ALLERGIES & MEDICATIONS  --------------------------------------------------------------------------------  Allergies    penicillin (Faint)    Intolerances      Standing Inpatient Medications  aspirin enteric coated 81 milliGRAM(s) Oral daily  chlorhexidine 2% Cloths 1 Application(s) Topical <User Schedule>  clonazePAM  Tablet 1 milliGRAM(s) Oral <User Schedule>  dextrose 5%. 1000 milliLiter(s) IV Continuous <Continuous>  dextrose 5%. 1000 milliLiter(s) IV Continuous <Continuous>  dextrose 50% Injectable 25 Gram(s) IV Push once  dextrose 50% Injectable 12.5 Gram(s) IV Push once  dextrose 50% Injectable 25 Gram(s) IV Push once  famotidine    Tablet 20 milliGRAM(s) Oral daily  furosemide    Tablet 40 milliGRAM(s) Oral daily  glucagon  Injectable 1 milliGRAM(s) IntraMuscular once  heparin   Injectable 5000 Unit(s) SubCutaneous three times a day  insulin glargine Injectable (LANTUS) 25 Unit(s) SubCutaneous every morning  insulin lispro (ADMELOG) corrective regimen sliding scale   SubCutaneous three times a day before meals  insulin lispro (ADMELOG) corrective regimen sliding scale   SubCutaneous at bedtime  metoprolol succinate ER 75 milliGRAM(s) Oral daily  pantoprazole    Tablet 40 milliGRAM(s) Oral daily  polyethylene glycol 3350 17 Gram(s) Oral daily  senna 2 Tablet(s) Oral at bedtime  tamsulosin 0.8 milliGRAM(s) Oral at bedtime    PRN Inpatient Medications  dextrose Oral Gel 15 Gram(s) Oral once PRN      REVIEW OF SYSTEMS  --------------------------------------------------------------------------------    Gen: denies fevers/chills,  CVS: denies chest pain/palpitations  Resp: denies SOB/Cough  GI: Denies N/V/Abd pain  : Denies dysuria/oliguria/hematuria        VITALS/PHYSICAL EXAM  --------------------------------------------------------------------------------  T(C): 36.8 (04-29-22 @ 20:58), Max: 36.8 (04-29-22 @ 20:58)  HR: 59 (04-29-22 @ 20:58) (59 - 66)  BP: 145/75 (04-29-22 @ 20:58) (121/72 - 145/75)  RR: 18 (04-29-22 @ 20:58) (18 - 18)  SpO2: 98% (04-29-22 @ 20:58) (97% - 100%)  Wt(kg): --        04-28-22 @ 07:01  -  04-29-22 @ 07:00  --------------------------------------------------------  IN: 940 mL / OUT: 1450 mL / NET: -510 mL    04-29-22 @ 07:01  -  04-29-22 @ 22:49  --------------------------------------------------------  IN: 690 mL / OUT: 800 mL / NET: -110 mL      Physical Exam:  	    Gen: Appears comfortable, on O2  	Pulm: decrease bs  no rales or ronchi or wheezing  	CV: No JVD. RRR, S1S2  	Abd: +BS, soft, nontender/nondistended + hernia midline   	: No suprapubic tenderness  	UE: Warm, no cyanosis  no clubbing,  no edema;  	LE: Warm, no cyanosis  no clubbing, no edema      LABS/STUDIES  --------------------------------------------------------------------------------              12.2   8.05  >-----------<  282      [04-29-22 @ 10:55]              36.3     128  |  94  |  81  ----------------------------<  234      [04-29-22 @ 07:23]  4.7   |  19  |  3.10        Ca     9.5     [04-29-22 @ 07:23]    TPro  6.9  /  Alb  3.7  /  TBili  0.4  /  DBili  x   /  AST  35  /  ALT  41  /  AlkPhos  84  [04-29-22 @ 07:23]          Creatinine Trend:  SCr 3.10 [04-29 @ 07:23]  SCr 2.99 [04-28 @ 06:27]  SCr 3.03 [04-27 @ 19:42]  SCr 3.24 [04-27 @ 06:16]  SCr 3.12 [04-26 @ 21:39]              Urinalysis - [04-25-22 @ 22:37]      Color Light Yellow / Appearance Clear / SG 1.018 / pH 6.5      Gluc 1000 mg/dL / Ketone Negative  / Bili Negative / Urobili Negative       Blood Negative / Protein Negative / Leuk Est Negative / Nitrite Negative      RBC  / WBC  / Hyaline  / Gran  / Sq Epi  / Non Sq Epi  / Bacteria     Urine Protein 4      [04-27-22 @ 09:23]    Ferritin 309      [04-28-22 @ 07:37]  PTH -- (Ca 10.7)      [04-27-22 @ 01:48]   12  PTH -- (Ca 11.1)      [04-26-22 @ 19:48]   7  Vitamin D (25OH) 30.5      [04-27-22 @ 09:25]  TSH 0.62      [04-26-22 @ 08:53]    C3 Complement 121      [04-28-22 @ 11:58]  C4 Complement 33      [04-28-22 @ 11:58]

## 2022-04-29 NOTE — DISCHARGE NOTE PROVIDER - HOSPITAL COURSE
72M c hx Asthma, Sarcoidosis requiring intermittent courses of steroids, HTN, HLD, DM, non-ischemic cardiomyopathy now with an LVEF ~ 30% on ECHO 4/14/2022 s/p AICD implantation and CKD. The patient was admitted to [facility] on 4/13 for pulmonary edema complicated by IRWIN. Subsequent IV fluid hydration was complicated by worsening heart failure requiring NIV for respiratory support. Had APCs, PVCs and NSVT on telemetry and was started on amiodarone. Cardiac perfusion scan was negative for ischemia - fixed defects were felt to be related to an infiltrative process. CT scan -> scattered perilymphatic nodules increased in size from a prior study -> started on steroids/mepron by rheumatology (felt not to be a candidate for methotrexate due to CKD or infliximab or humira due to CHF). He was discharged on 4/23 on amiodarone, toprol XL and ASA, and held Entresto and diuretics due to IRWIN presented with SOB and chest pain admitted with acute hypoxic respiratory failure, IRWIN sepsis 2/2 covid and CHF exacerbation.    Admitted with Acute respiratory failure with hypoxia secondary to Covid, decompensated systolic and diastolic heart failure and Sarcoidosis     Problem/Plan - 1:  Acute Systolic HF and diastolic heart failure   Echo - EF 40%. Moderate global hypokinesis. Moderate diastolic dysfunction (Stage II) - cardiac sarcoidosis with a non-ischemic cardiomyopathy  BNP at discharge at [place] 1900, now >11.6K  Treated with Lasix 40 IV BID > lasix 40 PO qd  Required BIPAP then transitioned to nasal canula which is now titrated to off  Respiratory symptoms have improved following IV diuresis and is Euvolemic     Problem/Plan - 2:   2019 novel coronavirus disease (COVID-19).   CT shows chronic lung dz without evidence of COVID pneumonia.  Started Decadron 4/26 - 4/28 ,- Dexa discontinued sec to Hyperglycemia and not hypoxic.   No Remdesivir sec to IRWIN    Problem/Plan - 3:  Moderate persistent asthma with severe bronchospasm -> resolved      Problem/Plan - 4:  PVCs/?NSVT at [place] and was started on Amio at the time  Per discussion with OP cardio Dr. Bell and Fanny, will hold given risk for lung toxicity     Problem/Plan - 5:  Chest pain.   Recent Stress test at [facility] with small inferior wall infarct  - likely 2/2 HF   - EF here on echo actually improved compared to echo at [place]     Problem/Plan - 6:  IRWIN on CKD  Unknown baseline. Cr 1.21 in 2017, Creatinine was 3.5 @ [facility]  Trended down - Creatinine ~3.1     Problem/Plan - 6:  Hyponatremia   Renal consulted     Problem/Plan - 7:  Hypercalcemia   Renal consulted  spep/ipep/bence jones pending  c3/c4 pending  calcium improving     Problem/Plan - 6:  Sarcoidosis   CT shows underlying pulmonary sarcoidosis without evidence of exacerbation  Pulmonary consulted   Pt reports minimal benefit from steroids in past  Severe hyperglycemia related to steroid use - now off steroids.     Problem/Plan - 7:  Diabetes mellitus with Hyperglycemia  Steroid induced hyperglycemia  Endocrine consulted and adjusted Insulins based on glucose levels.        72M c hx Asthma, Sarcoidosis requiring intermittent courses of steroids, HTN, HLD, DM, non-ischemic cardiomyopathy now with an LVEF ~ 30% on ECHO 4/14/2022 s/p AICD implantation and CKD. The patient was admitted to [facility] on 4/13 for pulmonary edema complicated by IRWIN. Subsequent IV fluid hydration was complicated by worsening heart failure requiring NIV for respiratory support. Had APCs, PVCs and NSVT on telemetry and was started on amiodarone. Cardiac perfusion scan was negative for ischemia - fixed defects were felt to be related to an infiltrative process. CT scan -> scattered perilymphatic nodules increased in size from a prior study -> started on steroids/mepron by rheumatology (felt not to be a candidate for methotrexate due to CKD or infliximab or humira due to CHF). He was discharged on 4/23 on amiodarone, toprol XL and ASA, and held Entresto and diuretics due to IRWIN presented with SOB and chest pain admitted with acute hypoxic respiratory failure, IRWIN sepsis 2/2 covid and CHF exacerbation.  Admitted with Acute respiratory failure with hypoxia secondary to Covid, decompensated systolic and diastolic heart failure and Sarcoidosis    #Acute Systolic HF and diastolic heart failure   Echo - EF 40%. Moderate global hypokinesis. Moderate diastolic dysfunction (Stage II) - cardiac sarcoidosis with a non-ischemic cardiomyopathy  BNP at discharge at [place] 1900, now >11.6K  Treated with Lasix 40 IV BID > lasix 40 PO qd  Required BIPAP then transitioned to nasal canula which is now titrated to off  Respiratory symptoms have improved following IV diuresis and is Euvolemic     # 2019 novel coronavirus disease (COVID-19).   CT shows chronic lung dz without evidence of COVID pneumonia.  Started Decadron 4/26 - 4/28 ,- Dexa discontinued sec to Hyperglycemia and not hypoxic.   No Remdesivir sec to IRWIN    # Moderate persistent asthma with severe bronchospasm -> resolved       # PVCs/?NSVT at [place] and was started on Amio at the time  Per discussion with OP cardio Dr. Bell and Fanny, will hold given risk for lung toxicity    # Chest pain.   Recent Stress test at [facility] with small inferior wall infarct  - likely 2/2 HF   - EF here on echo actually improved compared to echo at [place]    # IRWIN on CKD  Unknown baseline. Cr 1.21 in 2017, Creatinine was 3.5 @ [facility]  Trended down - Creatinine ~3.1    # Hyponatremia   Renal consulted    #Hypercalcemia   Renal consulted  spep/ipep/bence jones pending  c3/c4 pending  calcium improving    #Sarcoidosis   CT shows underlying pulmonary sarcoidosis without evidence of exacerbation  Pulmonary consulted   Pt reports minimal benefit from steroids in past  Severe hyperglycemia related to steroid use - now off steroids.     Problem/Plan - 7:  Diabetes mellitus with Hyperglycemia  Steroid induced hyperglycemia  Endocrine consulted and adjusted Insulins based on glucose levels.        This is a 72M c hx HTN, pulmonary sarcoidosis, asthma, CHF (?reported EF 45%) s/p AICD, DM2, ?CKD, "extra heart beat" on amiodarone, intermittent intractable hiccups, recent hospitalization at [place] (reportedly hospitalized for 12 days, d/c'd 4 days ago) for ?CP and CHF exacerbation, pw acute hypoxic respiratory failure, sepsis, IRWIN, 2/2 covid and poss CHF exacerbation, c/b steroid induced hyperglycemia. Plan of care is outlined as below-    1. Acute respiratory failure with hypoxia.   ·- Resolved, in room air now, O2 sat 98%. CT chest reviewewd  - likely related to CHF+chronic lung dz  - Pulm recs - monitor off systemic steroid, d/c on Advair discus, nebs etc.  - Outpatient f/u with Pulmonary    2. 2019 novel coronavirus disease (COVID-19).   - CT shows chronic lung disease, no GG opacity, no evidence of COVID pneumonia  - No need of remdesivir ( CKD) or steroid, not hypoxic.    3. CHF exacerbation.   - No acute SOB now, O2 sat 98% RA  - c/w Lasix 40mg po daily  - Cardiology f/u plan noted, c/w current meds- BB, no ACE/ARB for CKD    outpatient f/u with cardiology    4. IRWIN (acute kidney injury).   - Unknown baseline. Cr 1.21 in 2017, today Scr 3.27  - pt states his cr was 3.5 @ [place]  - trend Cr, avoid nephrotoxins  - Renal f/u plan noted. outpatient f/u    5. Sarcoidosis.   - pulm evaluated, steroids on hold for now as pt reports minimal benefit from steroids in past  -severe hyperglycemia related to steroid use - now off steroids.  - Outpatient f/u with Pulmonary    6. Diabetes mellitus.   - FS has been better controlled, 184<158, Elevated FS due to steroid induced hyperglycemia  -Endo rec Lantus 24 units qhs and Januvia 25mg/d, Scr 3.27.  - Outpatient f/u with Endocrine    The patient remained hemodynamically stable and was discharged       This is a 72M c hx HTN, pulmonary sarcoidosis, asthma, CHF (?reported EF 45%) s/p AICD, DM2, ?CKD, "extra heart beat" on amiodarone, intermittent intractable hiccups, recent hospitalization at [facility] (reportedly hospitalized for 12 days, d/c'd 4 days ago) for ?CP and CHF exacerbation, pw acute hypoxic respiratory failure, sepsis, IRWIN, 2/2 covid and poss CHF exacerbation, c/b steroid induced hyperglycemia. Plan of care is outlined as below-    1. Acute respiratory failure with hypoxia.   ·- Resolved, in room air now, O2 sat 98%. CT chest reviewewd  - likely related to CHF+chronic lung dz  - Pulm recs - monitor off systemic steroid, d/c on Advair discus, nebs etc.  - Outpatient f/u with Pulmonary    2. 2019 novel coronavirus disease (COVID-19).   - CT shows chronic lung disease, no GG opacity, no evidence of COVID pneumonia  - No need of remdesivir ( CKD) or steroid, not hypoxic.    3. CHF exacerbation.   - No acute SOB now, O2 sat 98% RA  - c/w Lasix 40mg po daily  - Cardiology f/u plan noted, c/w current meds- BB, no ACE/ARB for CKD    outpatient f/u with cardiology    4. IRWIN (acute kidney injury).   - Unknown baseline. Cr 1.21 in 2017, today Scr 3.27  - pt states his cr was 3.5 @ [facility]  - trend Cr, avoid nephrotoxins  - Renal f/u plan noted. outpatient f/u    5. Sarcoidosis.   - pulm evaluated, steroids on hold for now as pt reports minimal benefit from steroids in past  -severe hyperglycemia related to steroid use - now off steroids.  - Outpatient f/u with Pulmonary    6. Diabetes mellitus.   - FS has been better controlled, 184<158, Elevated FS due to steroid induced hyperglycemia  -Endo rec Lantus 24 units qhs and Januvia 25mg/d, Scr 3.27.  - Outpatient f/u with Endocrine    The patient remained hemodynamically stable and was discharged home.

## 2022-04-29 NOTE — DISCHARGE NOTE PROVIDER - CARE PROVIDERS DIRECT ADDRESSES
,DirectAddress_Unknown,DirectAddress_Unknown,DirectAddress_Unknown,DirectAddress_Unknown ,DirectAddress_Unknown,DirectAddress_Unknown,DirectAddress_Unknown,DirectAddress_Unknown,[email or web address]

## 2022-04-29 NOTE — PROVIDER CONTACT NOTE (OTHER) - ASSESSMENT
Patient is alert and oriented x4. Educated on the importance and safety of the chair alarm and bed alarm. Patient continue to refuse.
Pt a&ox4, vss. Fingerstick 473 & 549, patient asymptomatic.
Pt a&ox4. VSS- /74, HR 85. Pt requiring continuous BiPAP.
patient axo 4. on RA satting 95%. complaints of dizziness and nausea. VS checked BP- 132/80, Hr- 70. denies chest pain, sob and palpitation.
FS- 63 mg/dl x2. Patient axo 4. on RA. Patient asymptomatic, denies any distress.
Patient asymptomatic.
Pt c/o dizziness, vss

## 2022-04-29 NOTE — PROGRESS NOTE ADULT - NSPROGADDITIONALINFOA_GEN_ALL_CORE
P4D weekend pending further adjustments in insulin dosing  plan of care discussed with floor NP/PA
plan of care discussed with floor NP/PA
possible dc in 24-48 hours if continued clinical improvement.  PT eval

## 2022-04-29 NOTE — PROVIDER CONTACT NOTE (OTHER) - ACTION/TREATMENT ORDERED:
Renee WEIR made aware. Bed alarm and chair alarm turned off. Will round frequently on patient. Will continue to monitor.

## 2022-04-29 NOTE — PROGRESS NOTE ADULT - ASSESSMENT
72M c hx HTN, pulmonary sarcoidosis, asthma, CHF (?reported EF 45%) s/p AICD, DM2, ?CKD, "extra heart beat" on amiodarone, intermittent intractable hiccups, recent hospitalization at [facility] (reportedly hospitalized for 12 days, d/c'd 4 days ago) for ?CP and CHF exacerbation, pw SOB and CP.      Endocrine consulted for steroid-induced hyperglycemia. Pt is now off steroids.    Poorly controlled T2DM with steroid-induced hyperglycemia  A1c 7.6, close to goal for this 71 y/o patient but could be improved  -Hold oral DM agents while inpatient  -Decrease Lantus 30 units q24h. DO NOT HOLD IF NPO.  -Decrease Admelog 16 units TID pre-meal. HOLD IF NPO.  -Use low Admelog correction scale pre-meal  -Use low Admelog correction scale at bedtime  -Fingerstick BG before meals and bedtime  -Goal -180  -Carbohydrate consistent diet  Discharge plan:  -Likely to discharge patient home on basal insulin plus Januvia (at appropriate renal dose). Will d/c sulfonylurea given renal dysfunction. Final regimen pending clinical course.  -Recommend routine outpatient ophthalmology and endocrinology f/u. Can f/u with his home Endocrinologist.    Steroid-induced hyperglycemia  Has not been on steroids for his sarcoidosis for 1 year outpatient. Once off dexamethasone for covid, insulin requirements will drop.    HTN  -Outpatient goal BP <130/80. Management per primary team.      Vera Cole DO

## 2022-04-29 NOTE — PROGRESS NOTE ADULT - SUBJECTIVE AND OBJECTIVE BOX
Chief Complaint: T2DM    History: Patient received last dose of decadron on 4/28 AM. FS have been trending down.     MEDICATIONS  (STANDING):  aspirin enteric coated 81 milliGRAM(s) Oral daily  chlorhexidine 2% Cloths 1 Application(s) Topical <User Schedule>  clonazePAM  Tablet 1 milliGRAM(s) Oral <User Schedule>  dextrose 5%. 1000 milliLiter(s) (50 mL/Hr) IV Continuous <Continuous>  dextrose 5%. 1000 milliLiter(s) (100 mL/Hr) IV Continuous <Continuous>  dextrose 50% Injectable 25 Gram(s) IV Push once  dextrose 50% Injectable 12.5 Gram(s) IV Push once  dextrose 50% Injectable 25 Gram(s) IV Push once  famotidine    Tablet 20 milliGRAM(s) Oral daily  furosemide    Tablet 40 milliGRAM(s) Oral daily  glucagon  Injectable 1 milliGRAM(s) IntraMuscular once  heparin   Injectable 5000 Unit(s) SubCutaneous three times a day  insulin glargine Injectable (LANTUS) 32 Unit(s) SubCutaneous every morning  insulin lispro (ADMELOG) corrective regimen sliding scale   SubCutaneous three times a day before meals  insulin lispro (ADMELOG) corrective regimen sliding scale   SubCutaneous at bedtime  insulin lispro Injectable (ADMELOG) 18 Unit(s) SubCutaneous three times a day before meals  metoprolol succinate ER 75 milliGRAM(s) Oral daily  pantoprazole    Tablet 40 milliGRAM(s) Oral daily  polyethylene glycol 3350 17 Gram(s) Oral daily  senna 2 Tablet(s) Oral at bedtime  tamsulosin 0.8 milliGRAM(s) Oral at bedtime    MEDICATIONS  (PRN):  dextrose Oral Gel 15 Gram(s) Oral once PRN Blood Glucose LESS THAN 70 milliGRAM(s)/deciliter      Allergies    penicillin (Faint)    Intolerances      Review of Systems:  Constitutional: No fever  Eyes: No blurry vision  Neuro: No tremors  HEENT: No pain  Cardiovascular: No chest pain, palpitations  Respiratory: No SOB, no cough  GI: No nausea, vomiting, abdominal pain  : No dysuria  Skin: no rash  Psych: no depression  Endocrine: no polyuria, polydipsia      ALL OTHER SYSTEMS REVIEWED AND NEGATIVE      PHYSICAL EXAM:  VITALS: T(C): 36.4 (04-29-22 @ 11:21)  T(F): 97.6 (04-29-22 @ 11:21), Max: 97.7 (04-29-22 @ 01:02)  HR: 60 (04-29-22 @ 11:21) (58 - 66)  BP: 133/82 (04-29-22 @ 11:21) (121/72 - 133/82)  RR:  (18 - 18)  SpO2:  (97% - 100%)  Wt(kg): --  GENERAL: NAD, well-groomed, well-developed  EYES: No proptosis, no lid lag, anicteric  HEENT:  Atraumatic, Normocephalic, moist mucous membranes  RESPIRATORY: Clear to auscultation bilaterally  CARDIOVASCULAR: Regular rate and rhythm  GI: Soft, nontender, non distended, normal bowel sounds      POCT Blood Glucose.: 200 mg/dL (04-29-22 @ 13:08)  POCT Blood Glucose.: 209 mg/dL (04-29-22 @ 11:51)  POCT Blood Glucose.: 224 mg/dL (04-29-22 @ 07:22)  POCT Blood Glucose.: 242 mg/dL (04-28-22 @ 23:19)  POCT Blood Glucose.: 331 mg/dL (04-28-22 @ 21:38)  POCT Blood Glucose.: 222 mg/dL (04-28-22 @ 16:43)  POCT Blood Glucose.: 372 mg/dL (04-28-22 @ 11:38)  POCT Blood Glucose.: 343 mg/dL (04-28-22 @ 07:58)  POCT Blood Glucose.: 324 mg/dL (04-28-22 @ 06:02)  POCT Blood Glucose.: 435 mg/dL (04-28-22 @ 00:29)  POCT Blood Glucose.: 280 mg/dL (04-27-22 @ 21:43)  POCT Blood Glucose.: 351 mg/dL (04-27-22 @ 16:59)  POCT Blood Glucose.: 417 mg/dL (04-27-22 @ 16:57)  POCT Blood Glucose.: 376 mg/dL (04-27-22 @ 12:18)  POCT Blood Glucose.: 377 mg/dL (04-27-22 @ 08:20)  POCT Blood Glucose.: 379 mg/dL (04-26-22 @ 21:53)  POCT Blood Glucose.: 483 mg/dL (04-26-22 @ 17:13)  POCT Blood Glucose.: 464 mg/dL (04-26-22 @ 17:11)      04-29    128<L>  |  94<L>  |  81<H>  ----------------------------<  234<H>  4.7   |  19<L>  |  3.10<H>    EGFR if : x   EGFR if non : x     Ca    9.5      04-29  Mg     2.1     04-27  Phos  4.5     04-27    TPro  6.9  /  Alb  3.7  /  TBili  0.4  /  DBili  x   /  AST  35  /  ALT  41  /  AlkPhos  84  04-29          Thyroid Function Tests:  04-26 @ 08:53 TSH 0.62 FreeT4 -- T3 -- Anti TPO -- Anti Thyroglobulin Ab -- TSI --

## 2022-04-29 NOTE — DISCHARGE NOTE PROVIDER - NPI NUMBER (FOR SYSADMIN USE ONLY) :
[[id number]],[[id number]],[UNKNOWN] [[id number]],[UNKNOWN],[[id number]]

## 2022-04-29 NOTE — DISCHARGE NOTE PROVIDER - NSDCFUADDAPPT_GEN_ALL_CORE_FT
APPTS ARE READY TO BE MADE: [X ] YES    Best Family or Patient Contact (if needed):    Additional Information about above appointments (if needed):    1:   2:   3:     Other comments or requests:    APPTS ARE READY TO BE MADE: [X ] YES    Best Family or Patient Contact (if needed):    Additional Information about above appointments (if needed):    1:   2:   3:     Other comments or requests:   Patient was provided with information for Dr. Bell, Dr. Cabralse, Dr. Croft and Dr. Mcginnis and advised to call to schedule follow up within specified time frame. At this time patient declined scheduling assistance.

## 2022-04-29 NOTE — PROGRESS NOTE ADULT - SUBJECTIVE AND OBJECTIVE BOX
Patient is a 72y old  Male who presents with a chief complaint of sob, chest pain (29 Apr 2022 12:42)      INTERVAL History of Present Illness/OVERNIGHT EVENTS: off of steroids - glucose getting better.  will likely need further insulin dose adjustments prior to discharge    MEDICATIONS  (STANDING):  aspirin enteric coated 81 milliGRAM(s) Oral daily  chlorhexidine 2% Cloths 1 Application(s) Topical <User Schedule>  clonazePAM  Tablet 1 milliGRAM(s) Oral <User Schedule>  dextrose 5%. 1000 milliLiter(s) (50 mL/Hr) IV Continuous <Continuous>  dextrose 5%. 1000 milliLiter(s) (100 mL/Hr) IV Continuous <Continuous>  dextrose 50% Injectable 25 Gram(s) IV Push once  dextrose 50% Injectable 12.5 Gram(s) IV Push once  dextrose 50% Injectable 25 Gram(s) IV Push once  famotidine    Tablet 20 milliGRAM(s) Oral daily  furosemide    Tablet 40 milliGRAM(s) Oral daily  glucagon  Injectable 1 milliGRAM(s) IntraMuscular once  heparin   Injectable 5000 Unit(s) SubCutaneous three times a day  insulin glargine Injectable (LANTUS) 32 Unit(s) SubCutaneous every morning  insulin lispro (ADMELOG) corrective regimen sliding scale   SubCutaneous three times a day before meals  insulin lispro (ADMELOG) corrective regimen sliding scale   SubCutaneous at bedtime  insulin lispro Injectable (ADMELOG) 18 Unit(s) SubCutaneous three times a day before meals  metoprolol succinate ER 75 milliGRAM(s) Oral daily  pantoprazole    Tablet 40 milliGRAM(s) Oral daily  polyethylene glycol 3350 17 Gram(s) Oral daily  senna 2 Tablet(s) Oral at bedtime  tamsulosin 0.8 milliGRAM(s) Oral at bedtime    MEDICATIONS  (PRN):  dextrose Oral Gel 15 Gram(s) Oral once PRN Blood Glucose LESS THAN 70 milliGRAM(s)/deciliter      Allergies    penicillin (Faint)    Intolerances        REVIEW OF SYSTEMS:  Negative unless otherwise specified above.    Vital Signs Last 24 Hrs  T(C): 36.4 (29 Apr 2022 11:21), Max: 36.5 (29 Apr 2022 01:02)  T(F): 97.6 (29 Apr 2022 11:21), Max: 97.7 (29 Apr 2022 01:02)  HR: 60 (29 Apr 2022 11:21) (58 - 66)  BP: 133/82 (29 Apr 2022 11:21) (121/72 - 133/82)  BP(mean): --  RR: 18 (29 Apr 2022 11:21) (18 - 18)  SpO2: 99% (29 Apr 2022 11:21) (97% - 100%)        PHYSICAL EXAM:  GENERAL: No apparent distress, appears deconditioned  HEAD:  Atraumatic, Normocephalic  EYES: Conjunctiva and sclera clear, no discharge  ENMT: Moist mucous membranes, no nasal discharge  NECK: Supple, no JVD  CHEST/LUNG: Clear to auscultation bilaterally, no wheeze or rales  HEART: Regular rhythm, no rubs or gallops  ABDOMEN: Soft, Nontender, Nondistended  EXTREMITIES:  No clubbing, cyanosis or edema  SKIN: No rash, no new discoloration  NERVOUS SYSTEM:  Alert & Oriented; Bilateral Lower extremity mobile, sensation to light touch intact      LABS:                        12.2   8.05  )-----------( 282      ( 29 Apr 2022 10:55 )             36.3     29 Apr 2022 07:23    128    |  94     |  81     ----------------------------<  234    4.7     |  19     |  3.10     Ca    9.5        29 Apr 2022 07:23    TPro  6.9    /  Alb  3.7    /  TBili  0.4    /  DBili  x      /  AST  35     /  ALT  41     /  AlkPhos  84     29 Apr 2022 07:23        CAPILLARY BLOOD GLUCOSE      POCT Blood Glucose.: 200 mg/dL (29 Apr 2022 13:08)  POCT Blood Glucose.: 209 mg/dL (29 Apr 2022 11:51)  POCT Blood Glucose.: 224 mg/dL (29 Apr 2022 07:22)  POCT Blood Glucose.: 242 mg/dL (28 Apr 2022 23:19)  POCT Blood Glucose.: 331 mg/dL (28 Apr 2022 21:38)  POCT Blood Glucose.: 222 mg/dL (28 Apr 2022 16:43)      RADIOLOGY & ADDITIONAL TESTS:      Images reviewed personally    Consultant Notes Reviewed and Care Discussed with relevant Consultants.

## 2022-04-29 NOTE — PROVIDER CONTACT NOTE (OTHER) - SITUATION
Patient hyperglycemic 570 and 536 repeat FS.
Dose of baclofen scheduled, should I administer
Refusing bed alarm and chair alarm
Pt fingerstick 473 and 549 on repeat. Pt due for dose of 20 units of lantus.
Pt on continuous BiPAP due for PO amiodarone, dexamethasone, and metoprolol.
complaints of dizziness and nausea
hypoglycemia

## 2022-04-29 NOTE — DISCHARGE NOTE NURSING/CASE MANAGEMENT/SOCIAL WORK - NSDCPEFALRISK_GEN_ALL_CORE
For information on Fall & Injury Prevention, visit: [email or web address] OR  [email or web address] OR  https://www.cdc.gov/steadi/patient.html

## 2022-04-29 NOTE — PROVIDER CONTACT NOTE (OTHER) - RECOMMENDATIONS
Give insulin and reassess.
provider notify. decrease insulin. oral gel.
Notify provider. Give lantus dose? Additional insulin?
Notify provider. Push PO meds?
provider notify. anti emetic? blood works?
06-May-2017 12:17

## 2022-04-29 NOTE — PROVIDER CONTACT NOTE (OTHER) - BACKGROUND
Patient admitted for AHRF 2/2 covid. hx of CHF.
Pt admitted w/acute HF and SOB. PMH DM, BPH, asthma.
Pt admitted w/acute HF and SOB. PMH DM, BPH, asthma.
adm- ahrf, covid+decompensated HF phx- asthma, bph, dm, ventricular dysfunction of heart
71 y/o M with PMH of HTN, CHF s/p AICD, IDDM, CKD, sarcoidosis presenting with SOB. Patient states felt slightly short of breath this
adm-AHRF: covid + decompensated heart failure mello, hyperglycemia, phx- bph, dm, asthma HF
Pt admitted for HF

## 2022-04-30 VITALS
DIASTOLIC BLOOD PRESSURE: 45 MMHG | OXYGEN SATURATION: 98 % | RESPIRATION RATE: 18 BRPM | SYSTOLIC BLOOD PRESSURE: 100 MMHG | TEMPERATURE: 98 F | HEART RATE: 77 BPM

## 2022-04-30 LAB
ANION GAP SERPL CALC-SCNC: 16 MMOL/L — SIGNIFICANT CHANGE UP (ref 5–17)
BUN SERPL-MCNC: 82 MG/DL — HIGH (ref 7–23)
CALCIUM SERPL-MCNC: 9.7 MG/DL — SIGNIFICANT CHANGE UP (ref 8.4–10.5)
CHLORIDE SERPL-SCNC: 90 MMOL/L — LOW (ref 96–108)
CO2 SERPL-SCNC: 23 MMOL/L — SIGNIFICANT CHANGE UP (ref 22–31)
CREAT SERPL-MCNC: 3.27 MG/DL — HIGH (ref 0.5–1.3)
EGFR: 20 ML/MIN/1.73M2 — LOW
GLUCOSE BLDC GLUCOMTR-MCNC: 184 MG/DL — HIGH (ref 70–99)
GLUCOSE BLDC GLUCOMTR-MCNC: 270 MG/DL — HIGH (ref 70–99)
GLUCOSE SERPL-MCNC: 199 MG/DL — HIGH (ref 70–99)
POTASSIUM SERPL-MCNC: 3.9 MMOL/L — SIGNIFICANT CHANGE UP (ref 3.5–5.3)
POTASSIUM SERPL-SCNC: 3.9 MMOL/L — SIGNIFICANT CHANGE UP (ref 3.5–5.3)
SODIUM SERPL-SCNC: 129 MMOL/L — LOW (ref 135–145)

## 2022-04-30 PROCEDURE — 85018 HEMOGLOBIN: CPT

## 2022-04-30 PROCEDURE — 85025 COMPLETE CBC W/AUTO DIFF WBC: CPT

## 2022-04-30 PROCEDURE — 86160 COMPLEMENT ANTIGEN: CPT

## 2022-04-30 PROCEDURE — 85730 THROMBOPLASTIN TIME PARTIAL: CPT

## 2022-04-30 PROCEDURE — 82947 ASSAY GLUCOSE BLOOD QUANT: CPT

## 2022-04-30 PROCEDURE — 82435 ASSAY OF BLOOD CHLORIDE: CPT

## 2022-04-30 PROCEDURE — 99239 HOSP IP/OBS DSCHRG MGMT >30: CPT

## 2022-04-30 PROCEDURE — 87040 BLOOD CULTURE FOR BACTERIA: CPT

## 2022-04-30 PROCEDURE — 82010 KETONE BODYS QUAN: CPT

## 2022-04-30 PROCEDURE — 83605 ASSAY OF LACTIC ACID: CPT

## 2022-04-30 PROCEDURE — 96374 THER/PROPH/DIAG INJ IV PUSH: CPT

## 2022-04-30 PROCEDURE — 71045 X-RAY EXAM CHEST 1 VIEW: CPT

## 2022-04-30 PROCEDURE — 84145 PROCALCITONIN (PCT): CPT

## 2022-04-30 PROCEDURE — 82306 VITAMIN D 25 HYDROXY: CPT

## 2022-04-30 PROCEDURE — 93306 TTE W/DOPPLER COMPLETE: CPT

## 2022-04-30 PROCEDURE — 85652 RBC SED RATE AUTOMATED: CPT

## 2022-04-30 PROCEDURE — 84165 PROTEIN E-PHORESIS SERUM: CPT

## 2022-04-30 PROCEDURE — 85610 PROTHROMBIN TIME: CPT

## 2022-04-30 PROCEDURE — 83735 ASSAY OF MAGNESIUM: CPT

## 2022-04-30 PROCEDURE — 83880 ASSAY OF NATRIURETIC PEPTIDE: CPT

## 2022-04-30 PROCEDURE — 84155 ASSAY OF PROTEIN SERUM: CPT

## 2022-04-30 PROCEDURE — 82330 ASSAY OF CALCIUM: CPT

## 2022-04-30 PROCEDURE — 84132 ASSAY OF SERUM POTASSIUM: CPT

## 2022-04-30 PROCEDURE — 84100 ASSAY OF PHOSPHORUS: CPT

## 2022-04-30 PROCEDURE — 84443 ASSAY THYROID STIM HORMONE: CPT

## 2022-04-30 PROCEDURE — 83036 HEMOGLOBIN GLYCOSYLATED A1C: CPT

## 2022-04-30 PROCEDURE — 81003 URINALYSIS AUTO W/O SCOPE: CPT

## 2022-04-30 PROCEDURE — 82550 ASSAY OF CK (CPK): CPT

## 2022-04-30 PROCEDURE — 80048 BASIC METABOLIC PNL TOTAL CA: CPT

## 2022-04-30 PROCEDURE — 0225U NFCT DS DNA&RNA 21 SARSCOV2: CPT

## 2022-04-30 PROCEDURE — 82803 BLOOD GASES ANY COMBINATION: CPT

## 2022-04-30 PROCEDURE — 82652 VIT D 1 25-DIHYDROXY: CPT

## 2022-04-30 PROCEDURE — 82728 ASSAY OF FERRITIN: CPT

## 2022-04-30 PROCEDURE — 71250 CT THORAX DX C-: CPT

## 2022-04-30 PROCEDURE — 36415 COLL VENOUS BLD VENIPUNCTURE: CPT

## 2022-04-30 PROCEDURE — 93308 TTE F-UP OR LMTD: CPT

## 2022-04-30 PROCEDURE — 85014 HEMATOCRIT: CPT

## 2022-04-30 PROCEDURE — 86325 OTHER IMMUNOELECTROPHORESIS: CPT

## 2022-04-30 PROCEDURE — 82962 GLUCOSE BLOOD TEST: CPT

## 2022-04-30 PROCEDURE — 85379 FIBRIN DEGRADATION QUANT: CPT

## 2022-04-30 PROCEDURE — 83615 LACTATE (LD) (LDH) ENZYME: CPT

## 2022-04-30 PROCEDURE — 83970 ASSAY OF PARATHORMONE: CPT

## 2022-04-30 PROCEDURE — 86803 HEPATITIS C AB TEST: CPT

## 2022-04-30 PROCEDURE — 86334 IMMUNOFIX E-PHORESIS SERUM: CPT

## 2022-04-30 PROCEDURE — 87086 URINE CULTURE/COLONY COUNT: CPT

## 2022-04-30 PROCEDURE — 84156 ASSAY OF PROTEIN URINE: CPT

## 2022-04-30 PROCEDURE — 80053 COMPREHEN METABOLIC PANEL: CPT

## 2022-04-30 PROCEDURE — 82310 ASSAY OF CALCIUM: CPT

## 2022-04-30 PROCEDURE — 84295 ASSAY OF SERUM SODIUM: CPT

## 2022-04-30 PROCEDURE — 94640 AIRWAY INHALATION TREATMENT: CPT

## 2022-04-30 PROCEDURE — 99291 CRITICAL CARE FIRST HOUR: CPT | Mod: 25

## 2022-04-30 PROCEDURE — 82553 CREATINE MB FRACTION: CPT

## 2022-04-30 PROCEDURE — 76775 US EXAM ABDO BACK WALL LIM: CPT

## 2022-04-30 PROCEDURE — 94660 CPAP INITIATION&MGMT: CPT

## 2022-04-30 PROCEDURE — 84484 ASSAY OF TROPONIN QUANT: CPT

## 2022-04-30 PROCEDURE — 86140 C-REACTIVE PROTEIN: CPT

## 2022-04-30 RX ORDER — INSULIN LISPRO 100/ML
10 VIAL (ML) SUBCUTANEOUS
Qty: 0 | Refills: 0 | DISCHARGE

## 2022-04-30 RX ORDER — INSULIN GLARGINE 100 [IU]/ML
18 INJECTION, SOLUTION SUBCUTANEOUS
Qty: 0 | Refills: 0 | DISCHARGE

## 2022-04-30 RX ORDER — AMIODARONE HYDROCHLORIDE 400 MG/1
1 TABLET ORAL
Qty: 0 | Refills: 0 | DISCHARGE

## 2022-04-30 RX ORDER — FUROSEMIDE 40 MG
1 TABLET ORAL
Qty: 30 | Refills: 0
Start: 2022-04-30 | End: 2022-05-29

## 2022-04-30 RX ORDER — INSULIN GLARGINE 100 [IU]/ML
24 INJECTION, SOLUTION SUBCUTANEOUS
Qty: 0 | Refills: 0 | DISCHARGE

## 2022-04-30 RX ORDER — FUROSEMIDE 40 MG
1 TABLET ORAL
Qty: 0 | Refills: 0 | DISCHARGE
Start: 2022-04-30

## 2022-04-30 RX ORDER — ALBUTEROL 90 UG/1
2 AEROSOL, METERED ORAL
Qty: 1 | Refills: 0
Start: 2022-04-30 | End: 2022-05-29

## 2022-04-30 RX ORDER — SITAGLIPTIN 50 MG/1
1 TABLET, FILM COATED ORAL
Qty: 30 | Refills: 0
Start: 2022-04-30 | End: 2022-05-29

## 2022-04-30 RX ORDER — SODIUM BICARBONATE 1 MEQ/ML
2 SYRINGE (ML) INTRAVENOUS
Qty: 0 | Refills: 0 | DISCHARGE

## 2022-04-30 RX ADMIN — PANTOPRAZOLE SODIUM 40 MILLIGRAM(S): 20 TABLET, DELAYED RELEASE ORAL at 11:04

## 2022-04-30 RX ADMIN — CHLORHEXIDINE GLUCONATE 1 APPLICATION(S): 213 SOLUTION TOPICAL at 06:22

## 2022-04-30 RX ADMIN — Medication 40 MILLIGRAM(S): at 06:15

## 2022-04-30 RX ADMIN — HEPARIN SODIUM 5000 UNIT(S): 5000 INJECTION INTRAVENOUS; SUBCUTANEOUS at 13:05

## 2022-04-30 RX ADMIN — Medication 10 UNIT(S): at 07:51

## 2022-04-30 RX ADMIN — POLYETHYLENE GLYCOL 3350 17 GRAM(S): 17 POWDER, FOR SOLUTION ORAL at 11:04

## 2022-04-30 RX ADMIN — Medication 10 UNIT(S): at 11:57

## 2022-04-30 RX ADMIN — Medication 3: at 11:56

## 2022-04-30 RX ADMIN — Medication 81 MILLIGRAM(S): at 11:04

## 2022-04-30 RX ADMIN — HEPARIN SODIUM 5000 UNIT(S): 5000 INJECTION INTRAVENOUS; SUBCUTANEOUS at 06:15

## 2022-04-30 RX ADMIN — FAMOTIDINE 20 MILLIGRAM(S): 10 INJECTION INTRAVENOUS at 11:04

## 2022-04-30 RX ADMIN — INSULIN GLARGINE 25 UNIT(S): 100 INJECTION, SOLUTION SUBCUTANEOUS at 07:52

## 2022-04-30 RX ADMIN — Medication 75 MILLIGRAM(S): at 06:16

## 2022-04-30 NOTE — PROGRESS NOTE ADULT - NS ATTEND OPT1 GEN_ALL_CORE
I attest my time as attending is greater than 50% of the total combined time spent on qualifying patient care activities by the PA/NP and attending.
I independently performed the documented:

## 2022-04-30 NOTE — PROGRESS NOTE ADULT - PROBLEM SELECTOR PROBLEM 4
IRWIN (acute kidney injury)

## 2022-04-30 NOTE — PROGRESS NOTE ADULT - SUBJECTIVE AND OBJECTIVE BOX
Admitted for: Heart failure    Following for: Hyperglycemia    Subjective: Patient feels well today. Had hypoglycemia yesterday evening, insulin doses were decreased by Endocrine team yesterday after hypoglycemia      MEDICATIONS  (STANDING):  aspirin enteric coated 81 milliGRAM(s) Oral daily  chlorhexidine 2% Cloths 1 Application(s) Topical <User Schedule>  clonazePAM  Tablet 1 milliGRAM(s) Oral <User Schedule>  dextrose 5%. 1000 milliLiter(s) (50 mL/Hr) IV Continuous <Continuous>  dextrose 5%. 1000 milliLiter(s) (100 mL/Hr) IV Continuous <Continuous>  dextrose 50% Injectable 25 Gram(s) IV Push once  dextrose 50% Injectable 12.5 Gram(s) IV Push once  dextrose 50% Injectable 25 Gram(s) IV Push once  famotidine    Tablet 20 milliGRAM(s) Oral daily  furosemide    Tablet 40 milliGRAM(s) Oral daily  glucagon  Injectable 1 milliGRAM(s) IntraMuscular once  heparin   Injectable 5000 Unit(s) SubCutaneous three times a day  insulin glargine Injectable (LANTUS) 25 Unit(s) SubCutaneous every morning  insulin lispro (ADMELOG) corrective regimen sliding scale   SubCutaneous three times a day before meals  insulin lispro (ADMELOG) corrective regimen sliding scale   SubCutaneous at bedtime  insulin lispro Injectable (ADMELOG) 10 Unit(s) SubCutaneous three times a day before meals  metoprolol succinate ER 75 milliGRAM(s) Oral daily  pantoprazole    Tablet 40 milliGRAM(s) Oral daily  polyethylene glycol 3350 17 Gram(s) Oral daily  senna 2 Tablet(s) Oral at bedtime  tamsulosin 0.8 milliGRAM(s) Oral at bedtime    MEDICATIONS  (PRN):  dextrose Oral Gel 15 Gram(s) Oral once PRN Blood Glucose LESS THAN 70 milliGRAM(s)/deciliter      Allergies    penicillin (Faint)    Intolerances          PHYSICAL EXAM:  VITALS: T(C): 36.8 (04-30-22 @ 10:52)  T(F): 98.3 (04-30-22 @ 10:52), Max: 98.3 (04-30-22 @ 04:41)  HR: 77 (04-30-22 @ 10:52) (59 - 77)  BP: 100/45 (04-30-22 @ 10:52) (100/45 - 145/75)  RR:  (18 - 18)  SpO2:  (98% - 99%)  Wt(kg): --  GENERAL: NAD  EYES: No proptosis, no lid lag, anicteric  RESPIRATORY: Clear to auscultation bilaterally  CARDIOVASCULAR: Regular rate and rhythm  GI: Soft, nontender, non distended  EXT: b/l feet without wounds, 2+ pulses  PSYCH: Alert and oriented x 3, reactive affect      A1C with Estimated Average Glucose Result: 7.6 % (04-26-22 @ 08:52)      POCT Blood Glucose.: 270 mg/dL (04-30-22 @ 11:29)  POCT Blood Glucose.: 184 mg/dL (04-30-22 @ 07:19)  POCT Blood Glucose.: 158 mg/dL (04-29-22 @ 21:19)  POCT Blood Glucose.: 119 mg/dL (04-29-22 @ 17:24)  POCT Blood Glucose.: 63 mg/dL (04-29-22 @ 16:50)  POCT Blood Glucose.: 64 mg/dL (04-29-22 @ 16:40)  POCT Blood Glucose.: 200 mg/dL (04-29-22 @ 13:08)  POCT Blood Glucose.: 209 mg/dL (04-29-22 @ 11:51)  POCT Blood Glucose.: 224 mg/dL (04-29-22 @ 07:22)  POCT Blood Glucose.: 242 mg/dL (04-28-22 @ 23:19)  POCT Blood Glucose.: 331 mg/dL (04-28-22 @ 21:38)  POCT Blood Glucose.: 222 mg/dL (04-28-22 @ 16:43)  POCT Blood Glucose.: 372 mg/dL (04-28-22 @ 11:38)  POCT Blood Glucose.: 343 mg/dL (04-28-22 @ 07:58)  POCT Blood Glucose.: 324 mg/dL (04-28-22 @ 06:02)  POCT Blood Glucose.: 435 mg/dL (04-28-22 @ 00:29)  POCT Blood Glucose.: 280 mg/dL (04-27-22 @ 21:43)  POCT Blood Glucose.: 351 mg/dL (04-27-22 @ 16:59)  POCT Blood Glucose.: 417 mg/dL (04-27-22 @ 16:57)      04-30    129<L>  |  90<L>  |  82<H>  ----------------------------<  199<H>  3.9   |  23  |  3.27<H>    EGFR if : x   EGFR if non : x     Ca    9.7      04-30    TPro  6.9  /  Alb  3.7  /  TBili  0.4  /  DBili  x   /  AST  35  /  ALT  41  /  AlkPhos  84  04-29      Thyroid Function Tests:  04-26 @ 08:53 TSH 0.62 FreeT4 -- T3 -- Anti TPO -- Anti Thyroglobulin Ab -- TSI --

## 2022-04-30 NOTE — PROGRESS NOTE ADULT - REASON FOR ADMISSION
sob, chest pain

## 2022-04-30 NOTE — PROGRESS NOTE ADULT - PROBLEM SELECTOR PROBLEM 2
Steroid-induced hyperglycemia
Steroid-induced hyperglycemia
2019 novel coronavirus disease (COVID-19)
Steroid-induced hyperglycemia
2019 novel coronavirus disease (COVID-19)
Pneumonia due to COVID-19 virus
2019 novel coronavirus disease (COVID-19)

## 2022-04-30 NOTE — PROGRESS NOTE ADULT - PROBLEM SELECTOR PROBLEM 1
Type 2 diabetes mellitus with hyperglycemia
Acute respiratory failure with hypoxia

## 2022-04-30 NOTE — PROGRESS NOTE ADULT - SUBJECTIVE AND OBJECTIVE BOX
[facility]   [phone]  RENAL FOLLOW UP NOTE  --------------------------------------------------------------------------------  Chief Complaint:    24 hour events/subjective:    seen earlier   states his baseline cr is 3.5   states feels better overall and no edema     PAST HISTORY  --------------------------------------------------------------------------------  No significant changes to PMH, PSH, FHx, SHx, unless otherwise noted    ALLERGIES & MEDICATIONS  --------------------------------------------------------------------------------  Allergies    penicillin (Faint)    Intolerances      Standing Inpatient Medications  aspirin enteric coated 81 milliGRAM(s) Oral daily  chlorhexidine 2% Cloths 1 Application(s) Topical <User Schedule>  clonazePAM  Tablet 1 milliGRAM(s) Oral <User Schedule>  dextrose 5%. 1000 milliLiter(s) IV Continuous <Continuous>  dextrose 5%. 1000 milliLiter(s) IV Continuous <Continuous>  dextrose 50% Injectable 25 Gram(s) IV Push once  dextrose 50% Injectable 12.5 Gram(s) IV Push once  dextrose 50% Injectable 25 Gram(s) IV Push once  famotidine    Tablet 20 milliGRAM(s) Oral daily  furosemide    Tablet 40 milliGRAM(s) Oral daily  glucagon  Injectable 1 milliGRAM(s) IntraMuscular once  heparin   Injectable 5000 Unit(s) SubCutaneous three times a day  insulin glargine Injectable (LANTUS) 25 Unit(s) SubCutaneous every morning  insulin lispro (ADMELOG) corrective regimen sliding scale   SubCutaneous three times a day before meals  insulin lispro (ADMELOG) corrective regimen sliding scale   SubCutaneous at bedtime  insulin lispro Injectable (ADMELOG) 10 Unit(s) SubCutaneous three times a day before meals  metoprolol succinate ER 75 milliGRAM(s) Oral daily  pantoprazole    Tablet 40 milliGRAM(s) Oral daily  polyethylene glycol 3350 17 Gram(s) Oral daily  senna 2 Tablet(s) Oral at bedtime  tamsulosin 0.8 milliGRAM(s) Oral at bedtime    PRN Inpatient Medications  dextrose Oral Gel 15 Gram(s) Oral once PRN      REVIEW OF SYSTEMS  --------------------------------------------------------------------------------    Gen: denies fevers/chills,  CVS: denies chest pain/palpitations  Resp: denies SOB/Cough  GI: Denies N/V/Abd pain  : Denies dysuria/oliguria/hematuria        VITALS/PHYSICAL EXAM  --------------------------------------------------------------------------------  T(C): 36.8 (04-30-22 @ 10:52), Max: 36.8 (04-29-22 @ 20:58)  HR: 77 (04-30-22 @ 10:52) (59 - 77)  BP: 100/45 (04-30-22 @ 10:52) (100/45 - 145/75)  RR: 18 (04-30-22 @ 10:52) (18 - 18)  SpO2: 98% (04-30-22 @ 10:52) (98% - 99%)  Wt(kg): --        04-29-22 @ 07:01  -  04-30-22 @ 07:00  --------------------------------------------------------  IN: 690 mL / OUT: 800 mL / NET: -110 mL    04-30-22 @ 07:01  -  04-30-22 @ 19:49  --------------------------------------------------------  IN: 480 mL / OUT: 1200 mL / NET: -720 mL      Physical Exam:  	    Gen: Appears comfortable, on O2  	Pulm: decrease bs  no rales or ronchi or wheezing  	CV: No JVD. RRR, S1S2  	Abd: +BS, soft, nontender/nondistended + hernia midline   	: No suprapubic tenderness  	UE: Warm, no cyanosis  no clubbing,  no edema;  	LE: Warm, no cyanosis  no clubbing, no edema      LABS/STUDIES  --------------------------------------------------------------------------------              12.2   8.05  >-----------<  282      [04-29-22 @ 10:55]              36.3     129  |  90  |  82  ----------------------------<  199      [04-30-22 @ 07:29]  3.9   |  23  |  3.27        Ca     9.7     [04-30-22 @ 07:29]    TPro  6.9  /  Alb  3.7  /  TBili  0.4  /  DBili  x   /  AST  35  /  ALT  41  /  AlkPhos  84  [04-29-22 @ 07:23]          Creatinine Trend:  SCr 3.27 [04-30 @ 07:29]  SCr 3.10 [04-29 @ 07:23]  SCr 2.99 [04-28 @ 06:27]  SCr 3.03 [04-27 @ 19:42]  SCr 3.24 [04-27 @ 06:16]              Urinalysis - [04-25-22 @ 22:37]      Color Light Yellow / Appearance Clear / SG 1.018 / pH 6.5      Gluc 1000 mg/dL / Ketone Negative  / Bili Negative / Urobili Negative       Blood Negative / Protein Negative / Leuk Est Negative / Nitrite Negative      RBC  / WBC  / Hyaline  / Gran  / Sq Epi  / Non Sq Epi  / Bacteria     Urine Protein 4      [04-27-22 @ 09:23]    Ferritin 309      [04-28-22 @ 07:37]  PTH -- (Ca 10.7)      [04-27-22 @ 01:48]   12  PTH -- (Ca 11.1)      [04-26-22 @ 19:48]   7  Vitamin D (25OH) 30.5      [04-27-22 @ 09:25]  TSH 0.62      [04-26-22 @ 08:53]    C3 Complement 121      [04-28-22 @ 11:58]  C4 Complement 33      [04-28-22 @ 11:58]

## 2022-04-30 NOTE — PROGRESS NOTE ADULT - SUBJECTIVE AND OBJECTIVE BOX
Mohsin Khan, MD  Attending Physician, Division Of Hospital Medicine  Pager: [phone], Office: [phone]  Off hour pager: [phone]    Patient is a 72y old  Male who presents with a chief complaint of sob, chest pain    SUBJECTIVE / OVERNIGHT EVENTS:  Seen, examined the patient this am  Feels ok, no acute SOB, chest pain, O2 sat 98% RA, VSS    MEDICATIONS  (STANDING):  aspirin enteric coated 81 milliGRAM(s) Oral daily  chlorhexidine 2% Cloths 1 Application(s) Topical <User Schedule>  clonazePAM  Tablet 1 milliGRAM(s) Oral <User Schedule>  dextrose 5%. 1000 milliLiter(s) (50 mL/Hr) IV Continuous <Continuous>  dextrose 5%. 1000 milliLiter(s) (100 mL/Hr) IV Continuous <Continuous>  dextrose 50% Injectable 25 Gram(s) IV Push once  dextrose 50% Injectable 12.5 Gram(s) IV Push once  dextrose 50% Injectable 25 Gram(s) IV Push once  famotidine    Tablet 20 milliGRAM(s) Oral daily  furosemide    Tablet 40 milliGRAM(s) Oral daily  glucagon  Injectable 1 milliGRAM(s) IntraMuscular once  heparin   Injectable 5000 Unit(s) SubCutaneous three times a day  insulin glargine Injectable (LANTUS) 25 Unit(s) SubCutaneous every morning  insulin lispro (ADMELOG) corrective regimen sliding scale   SubCutaneous three times a day before meals  insulin lispro (ADMELOG) corrective regimen sliding scale   SubCutaneous at bedtime  insulin lispro Injectable (ADMELOG) 10 Unit(s) SubCutaneous three times a day before meals  metoprolol succinate ER 75 milliGRAM(s) Oral daily  pantoprazole    Tablet 40 milliGRAM(s) Oral daily  polyethylene glycol 3350 17 Gram(s) Oral daily  senna 2 Tablet(s) Oral at bedtime  tamsulosin 0.8 milliGRAM(s) Oral at bedtime    MEDICATIONS  (PRN):  dextrose Oral Gel 15 Gram(s) Oral once PRN Blood Glucose LESS THAN 70 milliGRAM(s)/deciliter      Vital Signs Last 24 Hrs  T(C): 36.8 (30 Apr 2022 10:52), Max: 36.8 (29 Apr 2022 20:58)  T(F): 98.3 (30 Apr 2022 10:52), Max: 98.3 (30 Apr 2022 04:41)  HR: 77 (30 Apr 2022 10:52) (59 - 77)  BP: 100/45 (30 Apr 2022 10:52) (100/45 - 145/75)  BP(mean): --  RR: 18 (30 Apr 2022 10:52) (18 - 18)  SpO2: 98% (30 Apr 2022 10:52) (98% - 99%)  CAPILLARY BLOOD GLUCOSE      POCT Blood Glucose.: 184 mg/dL (30 Apr 2022 07:19)  POCT Blood Glucose.: 158 mg/dL (29 Apr 2022 21:19)  POCT Blood Glucose.: 119 mg/dL (29 Apr 2022 17:24)  POCT Blood Glucose.: 63 mg/dL (29 Apr 2022 16:50)  POCT Blood Glucose.: 64 mg/dL (29 Apr 2022 16:40)  POCT Blood Glucose.: 200 mg/dL (29 Apr 2022 13:08)  POCT Blood Glucose.: 209 mg/dL (29 Apr 2022 11:51)    I&O's Summary    29 Apr 2022 07:01  -  30 Apr 2022 07:00  --------------------------------------------------------  IN: 690 mL / OUT: 800 mL / NET: -110 mL        PHYSICAL EXAM:-  GENERAL: NAD, well-developed  EYES: EOMI, PERRLA, conjunctiva and sclera clear  NECK: Supple, No JVD, no thyromegaly  CHEST/LUNG: Clear to auscultation bilaterally; No wheeze  HEART: Regular rate and rhythm; S1, S2 audible, No murmurs, rubs, or gallops  ABDOMEN: Soft, Nontender, Nondistended; Bowel sounds present  EXTREMITIES:  2+ Peripheral Pulses, No clubbing, cyanosis, or edema  NEURO: AAOx3, no focal deficit      LABS:                        12.2   8.05  )-----------( 282      ( 29 Apr 2022 10:55 )             36.3     04-30    129<L>  |  90<L>  |  82<H>  ----------------------------<  199<H>  3.9   |  23  |  3.27<H>    Ca    9.7      30 Apr 2022 07:29    TPro  6.9  /  Alb  3.7  /  TBili  0.4  /  DBili  x   /  AST  35  /  ALT  41  /  AlkPhos  84  04-29        RADIOLOGY & ADDITIONAL TESTS:    Imaging Personally Reviewed: CXR, CT chest  Consultant(s) Notes Reviewed: Card, Pulmonary

## 2022-04-30 NOTE — PROGRESS NOTE ADULT - PROVIDER SPECIALTY LIST ADULT
Cardiology
Endocrinology
Pulmonology
Pulmonology
Endocrinology
Nephrology
Nephrology
Pulmonology
Nephrology
Nephrology
Hospitalist
Hospitalist
Endocrinology
Hospitalist
Internal Medicine

## 2022-04-30 NOTE — PROGRESS NOTE ADULT - ASSESSMENT
72M c hx HTN, pulmonary sarcoidosis, asthma, CHF (?reported EF 45%) s/p AICD, DM2, ?CKD, "extra heart beat" on amiodarone, intermittent intractable hiccups, recent hospitalization at [place] (reportedly hospitalized for 12 days, d/c'd 4 days ago) for ?CP and CHF exacerbation, pw SOB and CP. pt with IRWIN and chf along with hypercalcemia    1- IRWIN suspected  2- chf  3- covid 19   4- HTN   5- sarcoidosis   6- hyponatremia   7- DM       irwin vs ckd in this pt.   unlikely diabetic nephropathy given no albuminuria   to have renal sono when able   continue lasix 40 mg po daily  na is steady but low.   if cr higher then will decrease lasix to 20 mg daily   saranya/marcio/bence jones pending  strict I/O

## 2022-04-30 NOTE — PROGRESS NOTE ADULT - ASSESSMENT
72M c hx HTN, pulmonary sarcoidosis, asthma, CHF (?reported EF 45%) s/p AICD, DM2, ?CKD, "extra heart beat" on amiodarone, intermittent intractable hiccups, recent hospitalization at [place] (reportedly hospitalized for 12 days, d/c'd 4 days ago) for ?CP and CHF exacerbation, pw acute hypoxic respiratory failure, sepsis, IRWIN, 2/2 covid and poss CHF exacerbation, c/b steroid induced hyperglycemia

## 2022-04-30 NOTE — PROGRESS NOTE ADULT - ASSESSMENT
72M c hx HTN, pulmonary sarcoidosis, asthma, CHF (?reported EF 45%) s/p AICD, DM2, ?CKD, "extra heart beat" on amiodarone, intermittent intractable hiccups, recent hospitalization at [place] (reportedly hospitalized for 12 days, d/c'd 4 days ago) for ?CP and CHF exacerbation, pw SOB and CP.      Endocrine consulted for steroid-induced hyperglycemia. Pt is now off steroids.    Poorly controlled T2DM with steroid-induced hyperglycemia  A1c 7.6, close to goal for this 73 y/o patient but could be improved  -Hold oral DM agents while inpatient  -Continue Lantus 25 units q24h. DO NOT HOLD IF NPO.  -Increase Admelog to 12 units TID pre-meal. HOLD IF NPO.  -Use low Admelog correction scale pre-meal  -Use low Admelog correction scale at bedtime  -Fingerstick BG before meals and bedtime  -Goal -180  -Carbohydrate consistent diet  Discharge plan:  -D/c with Lantus 24 units qHS and Januvia 25 mg daily. Will d/c sulfonylurea given renal dysfunction. Final regimen pending clinical course.  -Recommend routine outpatient ophthalmology and endocrinology f/u. Can f/u with his home Endocrinologist.    Steroid-induced hyperglycemia  Has not been on steroids for his sarcoidosis for 1 year outpatient. Once off dexamethasone for covid, insulin requirements will drop.    HTN  -Outpatient goal BP <130/80. Management per primary team.      Lawanda Garcia MD  Endocrine Fellow  Can be reached via teams. For follow up questions, discharge recommendations, or new consults, please call answering service at [phone] (weekdays); [phone] (nights/weekends)       72M c hx HTN, pulmonary sarcoidosis, asthma, CHF (?reported EF 45%) s/p AICD, DM2, ?CKD, "extra heart beat" on amiodarone, intermittent intractable hiccups, recent hospitalization at [place] (reportedly hospitalized for 12 days, d/c'd 4 days ago) for ?CP and CHF exacerbation, pw SOB and CP.      Endocrine consulted for steroid-induced hyperglycemia. Pt is now off steroids.    Poorly controlled T2DM with steroid-induced hyperglycemia  A1c 7.6, close to goal for this 71 y/o patient but could be improved  -Hold oral DM agents while inpatient  -Continue Lantus 25 units q24h. DO NOT HOLD IF NPO.  -Increase Admelog to 12 units TID pre-meal. HOLD IF NPO.  -Use low Admelog correction scale pre-meal  -Use low Admelog correction scale at bedtime  -Fingerstick BG before meals and bedtime  -Goal -180  -Carbohydrate consistent diet  Discharge plan:  -D/c with Lantus 24 units qHS and Januvia 25 mg daily. Will d/c sulfonylurea given renal dysfunction. Per team, patient states he is on Jardiance (last fill November 2021 noted in [employer]). Patient does have Systolic HF and Jardiance is indicated, however unclear if he is on it as he has not filled it, nor did he initially mention it. Additionally, patient came in with glucotoxicity and mild acidosis so will not initiate Jardiance now. Can be re-evaluated as an outpatient.  -Recommend routine outpatient ophthalmology and endocrinology f/u. Can f/u with his home Endocrinologist.    Steroid-induced hyperglycemia  Has not been on steroids for his sarcoidosis for 1 year outpatient. Once off dexamethasone for covid, insulin requirements will drop.    HTN  -Outpatient goal BP <130/80. Management per primary team.      Lawanda Garcia MD  Endocrine Fellow  Can be reached via teams. For follow up questions, discharge recommendations, or new consults, please call answering service at [phone] (weekdays); [phone] (nights/weekends)

## 2022-04-30 NOTE — PROGRESS NOTE ADULT - PROBLEM SELECTOR PLAN 3
monitor intake and output on lasix   f/up cardio recs
No acute SOB now, O2 sat 98% RA  - c/w Lasix 40mg po daily  - Cardiology f/u plan noted, c/w current meds- BB, no ACE/ARB for CKD    outpatient f/u recs
monitor intake and output on lasix   f/up cardio recs
monitor intake and output on lasix   f/up cardio recs

## 2022-04-30 NOTE — PROGRESS NOTE ADULT - PROBLEM SELECTOR PLAN 4
Unknown baseline. Cr 1.21 in 2017, today Scr 3.27  - pt states his cr was 3.5 @ [facility]  - trend Cr, avoid nephrotoxins  - Renal f/u plan noted
- unknown baseline. Cr 1.21 in 2017  - pt states his cr was 3.5 @ [facility]  - trend Cr, avoid nephrotoxins  monitor intake and output on lasix   HYPONATREMIA - f/up renal recs
- unknown baseline. Cr 1.21 in 2017  - pt states his cr was 3.5 @ [facility]  - trend Cr, avoid nephrotoxins  monitor intake and output on lasix   d/w renal
- unknown baseline. Cr 1.21 in 2017  - pt states his cr was 3.5 @ [facility]  - trend Cr, avoid nephrotoxins  monitor intake and output on lasix   d/w renal

## 2022-04-30 NOTE — PROGRESS NOTE ADULT - PROBLEM SELECTOR PLAN 6
FS has been better controlled, 184<158, Elevated FS due to steroid induced hyperglycemia  - awaiting on Endo rec for d/c- Lantus and Januvia, Scr 3.27
adjust insulin dose daily as appropriate based on glucose levels.   steroid induced hyperglycemia  defer to endo further insulin adjustments
adjust insulin dose daily as appropriate based on glucose levels.   steroid induced hyperglycemia  defer to endo further insulin adjustments  d/w Dr. Hodge
adjust insulin dose daily as appropriate based on glucose levels.   steroid induced hyperglycemia  defer to endo further insulin adjustments  d/w endo attending

## 2022-04-30 NOTE — PROGRESS NOTE ADULT - SUBJECTIVE AND OBJECTIVE BOX
Date of Service:  04-30-22 @ 14:00    Patient is a 72y old  Male who presents with a chief complaint of sob, chest pain (30 Apr 2022 13:15)      INTERVAL HISTORY: feels ok; COVID +    TELEMETRY Personally reviewed: Emmy BRINK 80s-100s;     REVIEW OF SYSTEMS:   CONSTITUTIONAL: No weakness  EYES/ENT: No visual changes; No throat pain  Neck: No pain or stiffness  Respiratory: No cough, wheezing, No shortness of breath  CARDIOVASCULAR: no chest pain or palpitations  GASTROINTESTINAL: No abdominal pain, no nausea, vomiting or hematemesis  GENITOURINARY: No dysuria, frequency or hematuria  NEUROLOGICAL: No stroke like symptoms  SKIN: No rashes    	  MEDICATIONS:  furosemide    Tablet 40 milliGRAM(s) Oral daily  metoprolol succinate ER 75 milliGRAM(s) Oral daily  tamsulosin 0.8 milliGRAM(s) Oral at bedtime        PHYSICAL EXAM:  T(C): 36.8 (04-30-22 @ 10:52), Max: 36.8 (04-29-22 @ 20:58)  HR: 77 (04-30-22 @ 10:52) (59 - 77)  BP: 100/45 (04-30-22 @ 10:52) (100/45 - 145/75)  RR: 18 (04-30-22 @ 10:52) (18 - 18)  SpO2: 98% (04-30-22 @ 10:52) (98% - 99%)  Wt(kg): --  I&O's Summary    29 Apr 2022 07:01  -  30 Apr 2022 07:00  --------------------------------------------------------  IN: 690 mL / OUT: 800 mL / NET: -110 mL    30 Apr 2022 07:01  -  30 Apr 2022 14:00  --------------------------------------------------------  IN: 480 mL / OUT: 1200 mL / NET: -720 mL          Appearance: In no distress	  HEENT:    PERRL, EOMI	  Cardiovascular:  S1 S2, No JVD  Respiratory: Lungs clear to auscultation	  Gastrointestinal:  Soft, Non-tender, + BS	  Vascularature:  No edema of LE  Psychiatric: Appropriate affect   Neuro: no acute focal deficits                               12.2   8.05  )-----------( 282      ( 29 Apr 2022 10:55 )             36.3     04-30    129<L>  |  90<L>  |  82<H>  ----------------------------<  199<H>  3.9   |  23  |  3.27<H>    Ca    9.7      30 Apr 2022 07:29    TPro  6.9  /  Alb  3.7  /  TBili  0.4  /  DBili  x   /  AST  35  /  ALT  41  /  AlkPhos  84  04-29        Labs personally reviewed      ASSESSMENT/PLAN: 	      72M c hx HTN, pulmonary sarcoidosis, asthma, CHF (?reported EF 45%) s/p AICD, DM2, ?CKD, "extra heart beat" on amiodarone, intermittent intractable hiccups, recent hospitalization at [place] (reportedly hospitalized for 12 days, d/c'd 4 days ago) for ?CP and CHF exacerbation, pw acute hypoxic respiratory failure, sepsis, IRWIN, 2/2 covid and poss CHF exacerbation, c/b steroid induced hyperglycemia     Problem/Plan - 1:  ·  Problem: Acute systolic HF  ·  Plan: HF with superimposed COVID  - much improved with diuresis  - BNP at discharge at [facility] 1900, now >11.6K  - Continue with Lasix IV daily and transition to PO in next 48 hours  - Now on PO Lasix presenting as euvolemic     Problem/Plan - 2:  ·  Problem: Pneumonia due to COVID-19 virus.   ·  Plan: - Decadron 6 qd as per primary team  - unable to start remdesivir 2/2 renal failure  - Pulm recs appreciated     Problem/Plan - 3:  - PVCs/?NSVT at [facility] and was started on Amio at the time  - discussed with OP cardio Stanislav Bell and Fanny, will hold given risk for lung toxicity     Problem/Plan - 4:  ·  Problem: Chest pain.   ·  Plan: Recent Stress test at [facility] with small inferior wall infarct  - likely 2/2 HF   - EF here on echo actually improved compared to echo at [facility]     Problem/Plan - 5:  ·  Problem: Sarcoidosis.   ·  Plan:   - cont steroids       Bronwyn He MSN, FNP-BC, AGACNP-BC, JAMAR  David Blackburn,  [facility]  Cardiovascular Medicine  [address] [place] [address], [address]  Office: [phone]  Cell: [phone]

## 2022-04-30 NOTE — PROGRESS NOTE ADULT - PROBLEM SELECTOR PLAN 1
resolved  room air now  likely related to CHF+chronic lung dz  f/up pulm recs - monitor off decadron, nebs etc
Resolved, in room air now, O2 sat 98%. CT chest reviewewd  - likely related to CHF+chronic lung dz  - Pulm recs - monitor off systemic steroid, d/c on Advair discus, nebs etc
resolved  room air now  likely related to CHF+chronic lung dz  f/up pulm recs - monitor off decadron, nebs etc
resolved  4L NC now  likely related to CHF+COVID  f/up pulm recs

## 2022-04-30 NOTE — PROGRESS NOTE ADULT - PROBLEM SELECTOR PROBLEM 3
CHF exacerbation
HTN (hypertension)
CHF exacerbation
HTN (hypertension)
HTN (hypertension)
CHF exacerbation
CHF exacerbation

## 2022-04-30 NOTE — PROGRESS NOTE ADULT - NS ATTEND AMEND GEN_ALL_CORE FT
Pt care and plan discussed and reviewed with NP. Plan as outlined above edited by me to reflect our discussion.
Pt care and plan discussed and reviewed with NP. Plan as outlined above edited by me to reflect our discussion.
pt with worsening renal function with cr 3 range outpt per pt  lungs decrease bs   heart RRR  ext trace edema    72M c hx HTN, pulmonary sarcoidosis, asthma, CHF (?reported EF 45%) s/p AICD, DM2, ?CKD, "extra heart beat" on amiodarone, intermittent intractable hiccups, recent hospitalization at [place] (reportedly hospitalized for 12 days, d/c'd 4 days ago) for ?CP and CHF exacerbation, pw SOB and CP. pt with IRWIN and chf along with hypercalcemia    1- IRWIN suspected  2- chf  3- covid 19   4- HTN   5- sarcoidosis   6- hypercalcemia  7- DM       irwin vs ckd in this pt. he does not have proteinuria or signs of active sediment on his urinalysis therefore do not suspect diabetic nephropathy and less likely sarcoidosis as cause of his abn creatinine  to have renal sono when able   lasix 40 mg  po  one daily dose   hypercalcemia certainly can cause irwin as well. ca is better. given sarcoidosis to have vit d 25, vit d 1,25 level  intact pth and phos as well as spep/ipep/benzuleyka henning  strict I/O  remdsivir and decadron for covid 19   insulin to control hyperglycemia
Pt care and plan discussed and reviewed with NP. Plan as outlined above edited by me to reflect our discussion.
Pt care and plan discussed and reviewed with NP. Plan as outlined above edited by me to reflect our discussion.
cr steady   serologies pending for mello

## 2022-04-30 NOTE — PROGRESS NOTE ADULT - PROBLEM SELECTOR PLAN 5
- pulm consult done  - steroids on hold for now  pt reports minimal benefit from steroids in past  -severe hyperglycemia related to steroid use
- pulm consult done  - cont steroids as above
- pulm consult done  - steroids on hold for now  pt reports minimal benefit from steroids in past  -severe hyperglycemia related to steroid use - now off steroids
- pulm consult done  - steroids on hold for now  pt reports minimal benefit from steroids in past  -severe hyperglycemia related to steroid use - now off steroids

## 2022-05-01 LAB
CULTURE RESULTS: SIGNIFICANT CHANGE UP
SPECIMEN SOURCE: SIGNIFICANT CHANGE UP

## 2022-05-02 LAB
% ALBUMIN: 56.8 % — SIGNIFICANT CHANGE UP
% ALPHA 1: 3.9 % — SIGNIFICANT CHANGE UP
% ALPHA 2: 10.7 % — SIGNIFICANT CHANGE UP
% BETA: 13.8 % — SIGNIFICANT CHANGE UP
% GAMMA: 14.8 % — SIGNIFICANT CHANGE UP
% M SPIKE: SIGNIFICANT CHANGE UP
ALBUMIN SERPL ELPH-MCNC: 3.6 G/DL — SIGNIFICANT CHANGE UP (ref 3.6–5.5)
ALBUMIN/GLOB SERPL ELPH: 1.3 RATIO — SIGNIFICANT CHANGE UP
ALPHA1 GLOB SERPL ELPH-MCNC: 0.2 G/DL — SIGNIFICANT CHANGE UP (ref 0.1–0.4)
ALPHA2 GLOB SERPL ELPH-MCNC: 0.7 G/DL — SIGNIFICANT CHANGE UP (ref 0.5–1)
B-GLOBULIN SERPL ELPH-MCNC: 0.9 G/DL — SIGNIFICANT CHANGE UP (ref 0.5–1)
GAMMA GLOBULIN: 0.9 G/DL — SIGNIFICANT CHANGE UP (ref 0.6–1.6)
INTERPRETATION SERPL IFE-IMP: SIGNIFICANT CHANGE UP
M-SPIKE: SIGNIFICANT CHANGE UP (ref 0–0)
PROT PATTERN SERPL ELPH-IMP: SIGNIFICANT CHANGE UP

## 2022-05-03 NOTE — CHART NOTE - NSCHARTNOTEFT_GEN_A_CORE
Left a message for patient in regards to follow up care with call back information on 5/1/2022.    Left a second message for patient in regards to follow up care with call back information on 5/2/2022.

## 2022-05-03 NOTE — CHART NOTE - NSCHARTNOTESELECT_GEN_ALL_CORE
nausea/dizziness/Event Note
Endocrinology/Event Note
Event Note
Hypoglycemia/Event Note
d/c/Event Note

## 2022-06-19 NOTE — PATIENT PROFILE ADULT. - NS TRANSFER PATIENT BELONGINGS
asymptomatic, satting well on RA  - Lovenox 40mg subq BID  - Ferritin wnl,   - D-dimer 638 Cell Phone/PDA (specify)/Clothing/Wrist Watch

## 2022-12-20 NOTE — ED ADULT NURSE NOTE - CARDIO WDL
Normal rate, regular rhythm, normal S1, S2 heart sounds heard. Colchicine Counseling:  Patient counseled regarding adverse effects including but not limited to stomach upset (nausea, vomiting, stomach pain, or diarrhea).  Patient instructed to limit alcohol consumption while taking this medication.  Colchicine may reduce blood counts especially with prolonged use.  The patient understands that monitoring of kidney function and blood counts may be required, especially at baseline. The patient verbalized understanding of the proper use and possible adverse effects of colchicine.  All of the patient's questions and concerns were addressed.

## 2023-01-16 NOTE — PATIENT PROFILE ADULT. - PRO MENTAL HEALTH SX RECENT
Please contact [facility]. . Request 1-2 week's worth of BG readings, also please arrange for 3 1/2 month f/u - probably at [facility]. Thanks. AH   none

## 2024-05-08 NOTE — PATIENT PROFILE ADULT - FALL HARM RISK - ATTEMPT OOB
Called patient and let him know that a letter from  has been placed up front and pt stated he will be in tomorrow 05/09/2024 to  letter.   No

## 2024-05-16 NOTE — H&P ADULT - HISTORY OF PRESENT ILLNESS
72M c hx HTN, pulmonary sarcoidosis, asthma, CHF (?reported EF 45%) s/p AICD, DM2, ?CKD, "extra heart beat" on amiodarone, intermittent intractable hiccups, recent hospitalization at [place] (reportedly hospitalized for 12 days, d/c'd 4 days ago) for ?CP and CHF exacerbation, pw SOB and CP.    At discharge, pt states he was breathing well and feeling good. pt states his course at [facility] was a nuc pharm stress test which was reportedly normal. Pt reportedly diuresed with lasix. Pt also started on prednisone 30 for poss sarcoidosis. Pt is not sure why he was started on amiodarone, but states he was told he had an "extra heart beat". Pt also says his Cr was 3.5 while at [facility]. Pt states he did not have covid while at [facility].  Yesterday morning, awoke with mild SOB, worse with lying flat, improved with standing up. After eating dinner and one hour prior to arrival to ED, pt reports acute worsening of his SOB, chest tightness, coughing. Pt reports good compliance with his home meds. Pt states he does not eat any extra salt. At baseline very functional, completes his own IADLs, drives. Denies fevers, chills. Pt denies any relieving or exacerbating factors. 
previous recent hospitalizations complicated by multiple episodes of afib rvr, requiring lopressor ivp + digoxin ivp + amiodarone load + diltiazem ivp  no ekg done in er, but appears to be rate controlled  chadsvasc ~5   cont home eliquis  cont home lopressor + digoxin

## 2025-02-10 NOTE — PATIENT PROFILE ADULT. - AS SC BRADEN MOISTURE
AFTER VISIT SUMMARY (AVS):    At today's visit we thoroughly discussed various diagnostic possibilities for your symptoms, necessary evaluation, and the plan, which includes:  Orders Placed This Encounter   Procedures    MR Cervical Spine w/o Contrast    MR Thoracic Spine w/o Contrast    MR Lumbar Spine w/o Contrast     No new medications.    At the next visit we will also test your memory and discuss additional evaluation.    Next follow-up appointment is in the next 4 weeks or earlier if needed.    Please do not hesitate to call me with any questions or concerns.    Thanks.    (3) occasionally moist

## 2025-04-09 NOTE — PATIENT PROFILE ADULT. - PT NEEDS ASSIST
Medication: LISINOPRIL 5MG TABLETS   Last office visit date: 12/27/2024  Medication Refill Protocol Failed.  Failed criteria: See failed criteria below. Sent to clinician to review.     ACE Inhibitor Refill Protocol - 12 Month Protocol [id number]/09/2025 04:07 AM   Protocol Details eGFR resulted within last 12 months -- IF CRITERIA FAILED REFER TO PROTOCOL DETAILS    Potassium resulted within last 12 months is within 10% of normal range looking at last value -- IF CRITERIA FAILED REFER TO PROTOCOL DETAILS    Sodium resulted within last 12 months is within 10% of normal range looking at last value -- IF CRITERIA FAILED REFER TO PROTOCOL DETAILS    eGFR greater than 10 resulted within last 12 months looking at last value -- IF CRITERIA FAILED REFER TO PROTOCOL DETAILS        
no